# Patient Record
Sex: MALE | Race: OTHER | HISPANIC OR LATINO
[De-identification: names, ages, dates, MRNs, and addresses within clinical notes are randomized per-mention and may not be internally consistent; named-entity substitution may affect disease eponyms.]

---

## 2018-07-12 ENCOUNTER — APPOINTMENT (OUTPATIENT)
Dept: BURN CARE | Facility: CLINIC | Age: 63
End: 2018-07-12

## 2018-07-12 ENCOUNTER — OUTPATIENT (OUTPATIENT)
Dept: OUTPATIENT SERVICES | Facility: HOSPITAL | Age: 63
LOS: 1 days | Discharge: HOME | End: 2018-07-12

## 2018-07-12 PROBLEM — Z00.00 ENCOUNTER FOR PREVENTIVE HEALTH EXAMINATION: Status: ACTIVE | Noted: 2018-07-12
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
## 2018-07-24 DIAGNOSIS — S81.801A UNSPECIFIED OPEN WOUND, RIGHT LOWER LEG, INITIAL ENCOUNTER: ICD-10-CM
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
## 2018-12-03 ENCOUNTER — INPATIENT (INPATIENT)
Facility: HOSPITAL | Age: 63
LOS: 14 days | Discharge: NURSING, REHAB OR LONG-TERM CARE | End: 2018-12-18
Attending: INTERNAL MEDICINE | Admitting: INTERNAL MEDICINE
Payer: MEDICARE

## 2018-12-03 VITALS
SYSTOLIC BLOOD PRESSURE: 146 MMHG | DIASTOLIC BLOOD PRESSURE: 66 MMHG | RESPIRATION RATE: 22 BRPM | OXYGEN SATURATION: 95 % | HEART RATE: 110 BPM | WEIGHT: 233.03 LBS | TEMPERATURE: 101 F

## 2018-12-03 DIAGNOSIS — I87.8 OTHER SPECIFIED DISORDERS OF VEINS: ICD-10-CM

## 2018-12-03 DIAGNOSIS — E87.5 HYPERKALEMIA: ICD-10-CM

## 2018-12-03 DIAGNOSIS — K21.9 GASTRO-ESOPHAGEAL REFLUX DISEASE WITHOUT ESOPHAGITIS: ICD-10-CM

## 2018-12-03 DIAGNOSIS — L02.415 CUTANEOUS ABSCESS OF RIGHT LOWER LIMB: ICD-10-CM

## 2018-12-03 DIAGNOSIS — Z89.512 ACQUIRED ABSENCE OF LEFT LEG BELOW KNEE: Chronic | ICD-10-CM

## 2018-12-03 DIAGNOSIS — I50.9 HEART FAILURE, UNSPECIFIED: ICD-10-CM

## 2018-12-03 DIAGNOSIS — L03.90 CELLULITIS, UNSPECIFIED: ICD-10-CM

## 2018-12-03 DIAGNOSIS — E83.42 HYPOMAGNESEMIA: ICD-10-CM

## 2018-12-03 DIAGNOSIS — E87.1 HYPO-OSMOLALITY AND HYPONATREMIA: ICD-10-CM

## 2018-12-03 DIAGNOSIS — L97.219 NON-PRESSURE CHRONIC ULCER OF RIGHT CALF WITH UNSPECIFIED SEVERITY: ICD-10-CM

## 2018-12-03 DIAGNOSIS — Z28.9 IMMUNIZATION NOT CARRIED OUT FOR UNSPECIFIED REASON: ICD-10-CM

## 2018-12-03 DIAGNOSIS — A41.9 SEPSIS, UNSPECIFIED ORGANISM: ICD-10-CM

## 2018-12-03 DIAGNOSIS — R65.20 SEVERE SEPSIS WITHOUT SEPTIC SHOCK: ICD-10-CM

## 2018-12-03 DIAGNOSIS — K80.20 CALCULUS OF GALLBLADDER WITHOUT CHOLECYSTITIS WITHOUT OBSTRUCTION: ICD-10-CM

## 2018-12-03 DIAGNOSIS — R74.0 NONSPECIFIC ELEVATION OF LEVELS OF TRANSAMINASE AND LACTIC ACID DEHYDROGENASE [LDH]: ICD-10-CM

## 2018-12-03 DIAGNOSIS — D72.819 DECREASED WHITE BLOOD CELL COUNT, UNSPECIFIED: ICD-10-CM

## 2018-12-03 DIAGNOSIS — Z89.612 ACQUIRED ABSENCE OF LEFT LEG ABOVE KNEE: ICD-10-CM

## 2018-12-03 DIAGNOSIS — E87.2 ACIDOSIS: ICD-10-CM

## 2018-12-03 DIAGNOSIS — L03.115 CELLULITIS OF RIGHT LOWER LIMB: ICD-10-CM

## 2018-12-03 DIAGNOSIS — I11.0 HYPERTENSIVE HEART DISEASE WITH HEART FAILURE: ICD-10-CM

## 2018-12-03 DIAGNOSIS — L97.519 NON-PRESSURE CHRONIC ULCER OF OTHER PART OF RIGHT FOOT WITH UNSPECIFIED SEVERITY: ICD-10-CM

## 2018-12-03 LAB
ALBUMIN SERPL ELPH-MCNC: 3.1 G/DL — LOW (ref 3.5–5.2)
ALP SERPL-CCNC: 116 U/L — HIGH (ref 30–115)
ALT FLD-CCNC: 59 U/L — HIGH (ref 0–41)
ANION GAP SERPL CALC-SCNC: 17 MMOL/L — HIGH (ref 7–14)
AST SERPL-CCNC: 100 U/L — HIGH (ref 0–41)
BASOPHILS # BLD AUTO: 0.02 K/UL — SIGNIFICANT CHANGE UP (ref 0–0.2)
BASOPHILS NFR BLD AUTO: 0.1 % — SIGNIFICANT CHANGE UP (ref 0–1)
BILIRUB SERPL-MCNC: 3 MG/DL — HIGH (ref 0.2–1.2)
BUN SERPL-MCNC: 16 MG/DL — SIGNIFICANT CHANGE UP (ref 10–20)
CALCIUM SERPL-MCNC: 8 MG/DL — LOW (ref 8.5–10.1)
CHLORIDE SERPL-SCNC: 93 MMOL/L — LOW (ref 98–110)
CO2 SERPL-SCNC: 19 MMOL/L — SIGNIFICANT CHANGE UP (ref 17–32)
CREAT SERPL-MCNC: 1 MG/DL — SIGNIFICANT CHANGE UP (ref 0.7–1.5)
EOSINOPHIL # BLD AUTO: 0 K/UL — SIGNIFICANT CHANGE UP (ref 0–0.7)
EOSINOPHIL NFR BLD AUTO: 0 % — SIGNIFICANT CHANGE UP (ref 0–8)
GAS PNL BLDV: SIGNIFICANT CHANGE UP
GLUCOSE SERPL-MCNC: 94 MG/DL — SIGNIFICANT CHANGE UP (ref 70–99)
HCT VFR BLD CALC: 42.2 % — SIGNIFICANT CHANGE UP (ref 42–52)
HGB BLD-MCNC: 15.3 G/DL — SIGNIFICANT CHANGE UP (ref 14–18)
IMM GRANULOCYTES NFR BLD AUTO: 0.6 % — HIGH (ref 0.1–0.3)
LACTATE SERPL-SCNC: 2 MMOL/L — SIGNIFICANT CHANGE UP (ref 0.5–2.2)
LDH SERPL L TO P-CCNC: 302 U/L — HIGH (ref 50–242)
LYMPHOCYTES # BLD AUTO: 0.52 K/UL — LOW (ref 1.2–3.4)
LYMPHOCYTES # BLD AUTO: 3.3 % — LOW (ref 20.5–51.1)
MCHC RBC-ENTMCNC: 33.9 PG — HIGH (ref 27–31)
MCHC RBC-ENTMCNC: 36.3 G/DL — SIGNIFICANT CHANGE UP (ref 32–37)
MCV RBC AUTO: 93.6 FL — SIGNIFICANT CHANGE UP (ref 80–94)
MONOCYTES # BLD AUTO: 1.1 K/UL — HIGH (ref 0.1–0.6)
MONOCYTES NFR BLD AUTO: 6.9 % — SIGNIFICANT CHANGE UP (ref 1.7–9.3)
NEUTROPHILS # BLD AUTO: 14.1 K/UL — HIGH (ref 1.4–6.5)
NEUTROPHILS NFR BLD AUTO: 89.1 % — HIGH (ref 42.2–75.2)
PLATELET # BLD AUTO: 105 K/UL — LOW (ref 130–400)
POTASSIUM SERPL-MCNC: 3.4 MMOL/L — LOW (ref 3.5–5)
POTASSIUM SERPL-SCNC: 3.4 MMOL/L — LOW (ref 3.5–5)
PROT SERPL-MCNC: 6.7 G/DL — SIGNIFICANT CHANGE UP (ref 6–8)
RBC # BLD: 4.51 M/UL — LOW (ref 4.7–6.1)
RBC # FLD: 12.1 % — SIGNIFICANT CHANGE UP (ref 11.5–14.5)
SODIUM SERPL-SCNC: 129 MMOL/L — LOW (ref 135–146)
WBC # BLD: 15.83 K/UL — HIGH (ref 4.8–10.8)
WBC # FLD AUTO: 15.83 K/UL — HIGH (ref 4.8–10.8)

## 2018-12-03 RX ORDER — FUROSEMIDE 40 MG
60 TABLET ORAL DAILY
Qty: 0 | Refills: 0 | Status: DISCONTINUED | OUTPATIENT
Start: 2018-12-03 | End: 2018-12-06

## 2018-12-03 RX ORDER — VANCOMYCIN HCL 1 G
1500 VIAL (EA) INTRAVENOUS ONCE
Qty: 0 | Refills: 0 | Status: DISCONTINUED | OUTPATIENT
Start: 2018-12-03 | End: 2018-12-03

## 2018-12-03 RX ORDER — SPIRONOLACTONE 25 MG/1
25 TABLET, FILM COATED ORAL DAILY
Qty: 0 | Refills: 0 | Status: DISCONTINUED | OUTPATIENT
Start: 2018-12-03 | End: 2018-12-13

## 2018-12-03 RX ORDER — ONDANSETRON 8 MG/1
4 TABLET, FILM COATED ORAL EVERY 8 HOURS
Qty: 0 | Refills: 0 | Status: DISCONTINUED | OUTPATIENT
Start: 2018-12-03 | End: 2018-12-13

## 2018-12-03 RX ORDER — PIPERACILLIN AND TAZOBACTAM 4; .5 G/20ML; G/20ML
3.38 INJECTION, POWDER, LYOPHILIZED, FOR SOLUTION INTRAVENOUS EVERY 8 HOURS
Qty: 0 | Refills: 0 | Status: DISCONTINUED | OUTPATIENT
Start: 2018-12-03 | End: 2018-12-13

## 2018-12-03 RX ORDER — MORPHINE SULFATE 50 MG/1
4 CAPSULE, EXTENDED RELEASE ORAL ONCE
Qty: 0 | Refills: 0 | Status: DISCONTINUED | OUTPATIENT
Start: 2018-12-03 | End: 2018-12-03

## 2018-12-03 RX ORDER — CHLORHEXIDINE GLUCONATE 213 G/1000ML
1 SOLUTION TOPICAL
Qty: 0 | Refills: 0 | Status: DISCONTINUED | OUTPATIENT
Start: 2018-12-03 | End: 2018-12-13

## 2018-12-03 RX ORDER — CEFEPIME 1 G/1
2000 INJECTION, POWDER, FOR SOLUTION INTRAMUSCULAR; INTRAVENOUS ONCE
Qty: 0 | Refills: 0 | Status: DISCONTINUED | OUTPATIENT
Start: 2018-12-03 | End: 2018-12-03

## 2018-12-03 RX ORDER — SODIUM CHLORIDE 9 MG/ML
2800 INJECTION, SOLUTION INTRAVENOUS ONCE
Qty: 0 | Refills: 0 | Status: COMPLETED | OUTPATIENT
Start: 2018-12-03 | End: 2018-12-03

## 2018-12-03 RX ORDER — HEPARIN SODIUM 5000 [USP'U]/ML
5000 INJECTION INTRAVENOUS; SUBCUTANEOUS EVERY 8 HOURS
Qty: 0 | Refills: 0 | Status: DISCONTINUED | OUTPATIENT
Start: 2018-12-03 | End: 2018-12-13

## 2018-12-03 RX ORDER — PIPERACILLIN AND TAZOBACTAM 4; .5 G/20ML; G/20ML
3.38 INJECTION, POWDER, LYOPHILIZED, FOR SOLUTION INTRAVENOUS ONCE
Qty: 0 | Refills: 0 | Status: COMPLETED | OUTPATIENT
Start: 2018-12-03 | End: 2018-12-03

## 2018-12-03 RX ORDER — PANTOPRAZOLE SODIUM 20 MG/1
40 TABLET, DELAYED RELEASE ORAL
Qty: 0 | Refills: 0 | Status: DISCONTINUED | OUTPATIENT
Start: 2018-12-03 | End: 2018-12-13

## 2018-12-03 RX ORDER — METOPROLOL TARTRATE 50 MG
25 TABLET ORAL DAILY
Qty: 0 | Refills: 0 | Status: DISCONTINUED | OUTPATIENT
Start: 2018-12-03 | End: 2018-12-13

## 2018-12-03 RX ORDER — MORPHINE SULFATE 50 MG/1
1 CAPSULE, EXTENDED RELEASE ORAL EVERY 8 HOURS
Qty: 0 | Refills: 0 | Status: DISCONTINUED | OUTPATIENT
Start: 2018-12-03 | End: 2018-12-03

## 2018-12-03 RX ORDER — SODIUM CHLORIDE 9 MG/ML
1000 INJECTION INTRAMUSCULAR; INTRAVENOUS; SUBCUTANEOUS
Qty: 0 | Refills: 0 | Status: DISCONTINUED | OUTPATIENT
Start: 2018-12-03 | End: 2018-12-10

## 2018-12-03 RX ORDER — ACETAMINOPHEN 500 MG
650 TABLET ORAL ONCE
Qty: 0 | Refills: 0 | Status: COMPLETED | OUTPATIENT
Start: 2018-12-03 | End: 2018-12-03

## 2018-12-03 RX ORDER — ACETAMINOPHEN 500 MG
650 TABLET ORAL EVERY 6 HOURS
Qty: 0 | Refills: 0 | Status: DISCONTINUED | OUTPATIENT
Start: 2018-12-03 | End: 2018-12-13

## 2018-12-03 RX ORDER — POTASSIUM CHLORIDE 20 MEQ
40 PACKET (EA) ORAL EVERY 4 HOURS
Qty: 0 | Refills: 0 | Status: COMPLETED | OUTPATIENT
Start: 2018-12-03 | End: 2018-12-03

## 2018-12-03 RX ADMIN — PIPERACILLIN AND TAZOBACTAM 25 GRAM(S): 4; .5 INJECTION, POWDER, LYOPHILIZED, FOR SOLUTION INTRAVENOUS at 21:18

## 2018-12-03 RX ADMIN — PIPERACILLIN AND TAZOBACTAM 200 GRAM(S): 4; .5 INJECTION, POWDER, LYOPHILIZED, FOR SOLUTION INTRAVENOUS at 12:16

## 2018-12-03 RX ADMIN — Medication 100 MILLIGRAM(S): at 21:14

## 2018-12-03 RX ADMIN — Medication 650 MILLIGRAM(S): at 12:11

## 2018-12-03 RX ADMIN — Medication 40 MILLIEQUIVALENT(S): at 21:14

## 2018-12-03 RX ADMIN — SODIUM CHLORIDE 2800 MILLILITER(S): 9 INJECTION, SOLUTION INTRAVENOUS at 11:48

## 2018-12-03 RX ADMIN — SODIUM CHLORIDE 50 MILLILITER(S): 9 INJECTION INTRAMUSCULAR; INTRAVENOUS; SUBCUTANEOUS at 18:19

## 2018-12-03 RX ADMIN — Medication 100 MILLIGRAM(S): at 12:10

## 2018-12-03 RX ADMIN — MORPHINE SULFATE 4 MILLIGRAM(S): 50 CAPSULE, EXTENDED RELEASE ORAL at 12:11

## 2018-12-03 NOTE — ED PROVIDER NOTE - PROGRESS NOTE DETAILS
spoke to radiologist.  pt with soft tissue air at site of wound only pt has no abd pain, abd soft, nontender.  no ruq tenderness.  will get ruq sono

## 2018-12-03 NOTE — ED PROVIDER NOTE - PHYSICAL EXAMINATION
GEN: Well appearing, in no apparent distress.    HEAD:  Normocephalic, atraumatic.    EYES:  Clear conjunctivae without injection, drainage or discharge.    ENMT:  Moist MM.    NECK:  Supple, no masses. Normal ROM.    CARDIAC:  RRR, normal S1 and S2, no murmurs, rubs or gallops.    RESP:  Respiratory rate and effort appear normal; lungs are clear to auscultation bilaterally; no rhonchi, rales or wheezes.    ABDOMEN:  Soft, non-tender, non-distended, no masses. Normal BS throughout.    MUSCULOSKELETAL: (+) erythema, warmth, tenderness to LE, with lymphangitis up past knee into mid thigh. no crepitus, (+) tense leg (+) pulses equal B/L. (+) chronic skin changes with ulcerations no active bleeding. (+) BKA right.  NEURO:  AAO x 3. Motor 5/5.     SKIN:  General normal skin color for age and race, well-perfused; warm and dry.

## 2018-12-03 NOTE — ED PROVIDER NOTE - PMH
Circulation disorder of lower extremity  left lower extremity  Edema    GERD (gastroesophageal reflux disease)    Hypertension

## 2018-12-03 NOTE — H&P ADULT - NSHPPHYSICALEXAM_GEN_ALL_CORE
Vital Signs Last 24 Hrs  T(C): 36.8 (03 Dec 2018 15:07), Max: 38.3 (03 Dec 2018 10:36)  T(F): 98.3 (03 Dec 2018 15:07), Max: 100.9 (03 Dec 2018 10:36)  HR: 96 (03 Dec 2018 15:07) (96 - 110)  BP: 113/56 (03 Dec 2018 15:07) (113/56 - 146/66)  RR: 19 (03 Dec 2018 15:07) (19 - 22)  SpO2: 95% (03 Dec 2018 15:07) (95% - 95%)    GEN: Well appearing, in no apparent distress.    HEAD:  Normocephalic, atraumatic.    EYES:  Clear conjunctivae without injection, drainage or discharge.    NECK:  Supple, no masses. Normal ROM.    CARDIAC:  RRR, normal S1 and S2, no murmurs, rubs or gallops.    RESP:  Respiratory rate and effort appear normal; lungs are clear to auscultation bilaterally; no rhonchi, rales or wheezes.    ABDOMEN:  Soft, non-tender, non-distended, no masses. Normal BS throughout.    MUSCULOSKELETAL: (+) erythema, warmth, tenderness to LE, with lymphangitis up past knee into mid thigh. no crepitus, (+) tense leg (+) pulses equal B/L. (+) chronic skin changes with ulcerations no active bleeding. (+) BKA right.  NEURO:  AAO x 3. Motor 5/5.     SKIN:  General normal skin color for age and race, well-perfused; warm and dry. Vital Signs Last 24 Hrs  T(C): 36.8 (03 Dec 2018 15:07), Max: 38.3 (03 Dec 2018 10:36)  T(F): 98.3 (03 Dec 2018 15:07), Max: 100.9 (03 Dec 2018 10:36)  HR: 96 (03 Dec 2018 15:07) (96 - 110)  BP: 113/56 (03 Dec 2018 15:07) (113/56 - 146/66)  RR: 19 (03 Dec 2018 15:07) (19 - 22)  SpO2: 95% (03 Dec 2018 15:07) (95% - 95%)    GEN: Well appearing, in no apparent distress.    HEAD:  Normocephalic, atraumatic.    EYES:  Clear conjunctivae without injection, drainage or discharge.    NECK:  Supple, no masses. Normal ROM.    CARDIAC:  RRR, normal S1 and S2, no murmurs, rubs or gallops.    RESP:  Respiratory rate and effort appear normal; lungs are clear to auscultation bilaterally; no rhonchi, rales or wheezes.    ABDOMEN:  Soft, non-tender, non-distended, no masses. Normal BS throughout.    MUSCULOSKELETAL: (+) erythema, warmth, tenderness to RLE, with lymphangitis up past knee into mid thigh. no crepitus, (+) tense leg (+) pulses equal B/L. (+) chronic skin changes with ulcerations no active bleeding. (+) BKA LEFT    NEURO:  AAO x 3. Motor 5/5.     SKIN:  General normal skin color for age and race, well-perfused; warm and dry.

## 2018-12-03 NOTE — ED ADULT NURSE NOTE - NSIMPLEMENTINTERV_GEN_ALL_ED
Implemented All Fall with Harm Risk Interventions:  Tyler to call system. Call bell, personal items and telephone within reach. Instruct patient to call for assistance. Room bathroom lighting operational. Non-slip footwear when patient is off stretcher. Physically safe environment: no spills, clutter or unnecessary equipment. Stretcher in lowest position, wheels locked, appropriate side rails in place. Provide visual cue, wrist band, yellow gown, etc. Monitor gait and stability. Monitor for mental status changes and reorient to person, place, and time. Review medications for side effects contributing to fall risk. Reinforce activity limits and safety measures with patient and family. Provide visual clues: red socks.

## 2018-12-03 NOTE — H&P ADULT - NSHPREVIEWOFSYSTEMS_GEN_ALL_CORE
Constitutional: No fevers/chills.    Head: No injury, headache.    Eyes:  No visual changes, eye pain or discharge. No injury.    ENMT:  No hearing changes, pain, discharge or infections. No neck pain or stiffness. No loss ROM.    Cardiac:  No chest pain, SOB or edema. No chest pain with exertion.    Respiratory:  No cough or respiratory distress.     GI:  No nausea, vomiting, diarrhea or abdominal pain. No anorexia. No change in PO intake.    :  No frequency, urgency or burning. No change in urine output.    MS:  (+) leg pain, leg swelling, (-) myalgia, muscle weakness, joint pain or back pain. No loss ROM.    Neuro:  No dizziness or weakness.  No LOC.    Skin:  No skin rash. Constitutional: No fevers/chills.    Head: No injury, headache.    Eyes:  No visual changes, eye pain or discharge. No injury.    ENMT:  No hearing changes, pain, discharge or infections. No neck pain or stiffness. No loss ROM.    Cardiac:  No chest pain, SOB or edema. No chest pain with exertion.    Respiratory:  No cough or respiratory distress.     GI:  No nausea, vomiting, diarrhea or abdominal pain. No anorexia. No change in PO intake.    :  No frequency, urgency or burning. No change in urine output.    MS:  (+) RT leg pain, leg swelling, (-) myalgia, muscle weakness, joint pain or back pain. No loss ROM.    Neuro:  No dizziness or weakness.  No LOC.    Skin:  No skin rash.

## 2018-12-03 NOTE — ED ADULT TRIAGE NOTE - CHIEF COMPLAINT QUOTE
isaura from [facility] resident for right leg infection isaura from [facility] resident for right leg infection x 1 week

## 2018-12-03 NOTE — ED PROVIDER NOTE - ATTENDING CONTRIBUTION TO CARE
64 yo m with pmh of htn presents with right leg cellulitis for the past week.  + fevers.  awake, alert.  neck supple.  Significant cellulities to RLE with expanding erythema extending to the thigh, 2+ dp pulses, + tenderness and warmth to LE.  a/p:  sepsis, cellulitis.  p:  iv abx, will do ct given extensive cellulitis and sepsis for possible nec fasc.  admission

## 2018-12-03 NOTE — H&P ADULT - HISTORY OF PRESENT ILLNESS
62 yo male with PMHx GERD, HTN, PVD s/p right BKA presents for right LE cellulitis. Patient has chronic leg wound and had worsening pain starting yesterday. 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 Patient has chronic leg wound and had worsening pain starting yesterday. Patient denies chest pain, SOB, abdominal pain, nausea, vomiting, diarrhea, dizziness, weakness, changes in PO intake, changes in urine output, changes in mental status  subjective fever + at home

## 2018-12-03 NOTE — ED ADULT NURSE NOTE - PMH
Circulation disorder of lower extremity  left lower extremity  Edema    GERD (gastroesophageal reflux disease)    Hypertension
Check here if all serologies below were negative, non-reactive or immune. Otherwise select appropriate status.

## 2018-12-03 NOTE — H&P ADULT - ASSESSMENT
62 yo male with PMHx GERD, HTN, PVD s/p right BKA presents for right LE cellulitis. ACUTE ON CHRONIC WOUNDS       - iv ABX  - LABS IN AM   - INFECTIOUS DZ CONSULT   - IV HYDRATION   -cont meds   - follow vitals 62 yo male with PMHx GERD, HTN, PVD s/p LEFT BKA - 18 years ago  presents for right LE cellulitis. ACUTE ON CHRONIC WOUNDS       - iv ABX  - LABS IN AM   - INFECTIOUS DZ CONSULT   - IV HYDRATION   - cont meds   - follow vitals

## 2018-12-03 NOTE — ED PROVIDER NOTE - OBJECTIVE STATEMENT
62 yo male with PMHx GERD, HTN, PVD s/p right BKA presents for right LE cellulitis. Patient has chronic leg wound and had worsening pain starting yesterday. Patient denies chest pain, SOB, abdominal pain, nausea, vomiting, diarrhea, dizziness, weakness, changes in PO intake, changes in urine output, changes in mental status.

## 2018-12-03 NOTE — ED PROVIDER NOTE - MEDICAL DECISION MAKING DETAILS
pt with sepsis, cellulitis.  CT no evidence of nec fasciatis.  mild elevated bili.  No abd pain, no abd tenderness. no ruq tenderness.  sono with gallstones only.  pt admitted to medicine.  pt given ivf and iv abx. pt with sepsis, cellulitis.  CT no evidence of nec fasciatis.  mild elevated bili.  No abd pain, no abd tenderness. no ruq tenderness.  sono with gallstones only.  pt admitted to medicine.  pt given ivf and iv abx.  medical PA to follow up repeat lactate

## 2018-12-03 NOTE — H&P ADULT - NSHPLABSRESULTS_GEN_ALL_CORE
12-03    129<L>  |  93<L>  |  16  ----------------------------<  94  3.4<L>   |  19  |  1.0    Ca    8.0<L>      03 Dec 2018 12:32    TPro  6.7  /  Alb  3.1<L>  /  TBili  3.0<H>  /  DBili  x   /  AST  100<H>  /  ALT  59<H>  /  AlkPhos  116<H>  12-03                            15.3   15.83 )-----------( 105      ( 03 Dec 2018 12:32 )             42.2    < from: CT Lower Extremity No Cont, Right (12.03.18 @ 14:39) >    Impression:  1. No CT evidence of necrotizing fasciitis  2. Diffuse cellulitis from mid third of leg to foot with soft tissue   ulcerations along medial lateral distal leg  3. Medial tibial periostitis worst at the distal third consistent with   osteitis    < end of copied text >

## 2018-12-03 NOTE — ED PROVIDER NOTE - NS ED ROS FT
Constitutional: No fevers/chills.    Head: No injury, headache.    Eyes:  No visual changes, eye pain or discharge. No injury.    ENMT:  No hearing changes, pain, discharge or infections. No neck pain or stiffness. No loss ROM.    Cardiac:  No chest pain, SOB or edema. No chest pain with exertion.    Respiratory:  No cough or respiratory distress.     GI:  No nausea, vomiting, diarrhea or abdominal pain. No anorexia. No change in PO intake.    :  No frequency, urgency or burning. No change in urine output.    MS:  (+) leg pain, leg swelling, (-) myalgia, muscle weakness, joint pain or back pain. No loss ROM.    Neuro:  No dizziness or weakness.  No LOC.    Skin:  No skin rash.

## 2018-12-04 LAB
ANION GAP SERPL CALC-SCNC: 14 MMOL/L — SIGNIFICANT CHANGE UP (ref 7–14)
BUN SERPL-MCNC: 16 MG/DL — SIGNIFICANT CHANGE UP (ref 10–20)
CALCIUM SERPL-MCNC: 7.7 MG/DL — LOW (ref 8.5–10.1)
CHLORIDE SERPL-SCNC: 98 MMOL/L — SIGNIFICANT CHANGE UP (ref 98–110)
CO2 SERPL-SCNC: 21 MMOL/L — SIGNIFICANT CHANGE UP (ref 17–32)
CREAT SERPL-MCNC: 0.8 MG/DL — SIGNIFICANT CHANGE UP (ref 0.7–1.5)
GLUCOSE SERPL-MCNC: 84 MG/DL — SIGNIFICANT CHANGE UP (ref 70–99)
HCT VFR BLD CALC: 37.9 % — LOW (ref 42–52)
HGB BLD-MCNC: 13.9 G/DL — LOW (ref 14–18)
LACTATE SERPL-SCNC: 2.8 MMOL/L — HIGH (ref 0.5–2.2)
MAGNESIUM SERPL-MCNC: 1.8 MG/DL — SIGNIFICANT CHANGE UP (ref 1.8–2.4)
MCHC RBC-ENTMCNC: 35.4 PG — HIGH (ref 27–31)
MCHC RBC-ENTMCNC: 36.7 G/DL — SIGNIFICANT CHANGE UP (ref 32–37)
MCV RBC AUTO: 96.4 FL — HIGH (ref 80–94)
NRBC # BLD: 0 /100 WBCS — SIGNIFICANT CHANGE UP (ref 0–0)
PLATELET # BLD AUTO: 108 K/UL — LOW (ref 130–400)
POTASSIUM SERPL-MCNC: 4.2 MMOL/L — SIGNIFICANT CHANGE UP (ref 3.5–5)
POTASSIUM SERPL-SCNC: 4.2 MMOL/L — SIGNIFICANT CHANGE UP (ref 3.5–5)
RBC # BLD: 3.93 M/UL — LOW (ref 4.7–6.1)
RBC # FLD: 12.5 % — SIGNIFICANT CHANGE UP (ref 11.5–14.5)
SODIUM SERPL-SCNC: 133 MMOL/L — LOW (ref 135–146)
WBC # BLD: 13.53 K/UL — HIGH (ref 4.8–10.8)
WBC # FLD AUTO: 13.53 K/UL — HIGH (ref 4.8–10.8)

## 2018-12-04 RX ORDER — SODIUM CHLORIDE 9 MG/ML
500 INJECTION INTRAMUSCULAR; INTRAVENOUS; SUBCUTANEOUS ONCE
Qty: 0 | Refills: 0 | Status: COMPLETED | OUTPATIENT
Start: 2018-12-04 | End: 2018-12-04

## 2018-12-04 RX ORDER — SODIUM CHLORIDE 9 MG/ML
50 INJECTION INTRAMUSCULAR; INTRAVENOUS; SUBCUTANEOUS ONCE
Qty: 0 | Refills: 0 | Status: DISCONTINUED | OUTPATIENT
Start: 2018-12-04 | End: 2018-12-04

## 2018-12-04 RX ADMIN — PANTOPRAZOLE SODIUM 40 MILLIGRAM(S): 20 TABLET, DELAYED RELEASE ORAL at 05:06

## 2018-12-04 RX ADMIN — Medication 25 MILLIGRAM(S): at 05:06

## 2018-12-04 RX ADMIN — Medication 650 MILLIGRAM(S): at 19:49

## 2018-12-04 RX ADMIN — Medication 100 MILLIGRAM(S): at 05:07

## 2018-12-04 RX ADMIN — Medication 60 MILLIGRAM(S): at 05:06

## 2018-12-04 RX ADMIN — HEPARIN SODIUM 5000 UNIT(S): 5000 INJECTION INTRAVENOUS; SUBCUTANEOUS at 14:50

## 2018-12-04 RX ADMIN — Medication 100 MILLIGRAM(S): at 13:48

## 2018-12-04 RX ADMIN — SODIUM CHLORIDE 500 MILLILITER(S): 9 INJECTION INTRAMUSCULAR; INTRAVENOUS; SUBCUTANEOUS at 22:58

## 2018-12-04 RX ADMIN — SODIUM CHLORIDE 50 MILLILITER(S): 9 INJECTION INTRAMUSCULAR; INTRAVENOUS; SUBCUTANEOUS at 21:18

## 2018-12-04 RX ADMIN — Medication 100 MILLIGRAM(S): at 21:18

## 2018-12-04 RX ADMIN — PIPERACILLIN AND TAZOBACTAM 25 GRAM(S): 4; .5 INJECTION, POWDER, LYOPHILIZED, FOR SOLUTION INTRAVENOUS at 05:07

## 2018-12-04 RX ADMIN — Medication 1 APPLICATION(S): at 11:15

## 2018-12-04 RX ADMIN — PIPERACILLIN AND TAZOBACTAM 25 GRAM(S): 4; .5 INJECTION, POWDER, LYOPHILIZED, FOR SOLUTION INTRAVENOUS at 21:18

## 2018-12-04 RX ADMIN — SPIRONOLACTONE 25 MILLIGRAM(S): 25 TABLET, FILM COATED ORAL at 05:06

## 2018-12-04 RX ADMIN — Medication 650 MILLIGRAM(S): at 19:40

## 2018-12-04 RX ADMIN — PIPERACILLIN AND TAZOBACTAM 25 GRAM(S): 4; .5 INJECTION, POWDER, LYOPHILIZED, FOR SOLUTION INTRAVENOUS at 13:48

## 2018-12-05 RX ORDER — LINEZOLID 600 MG/300ML
600 INJECTION, SOLUTION INTRAVENOUS ONCE
Qty: 0 | Refills: 0 | Status: COMPLETED | OUTPATIENT
Start: 2018-12-05 | End: 2018-12-06

## 2018-12-05 RX ORDER — LINEZOLID 600 MG/300ML
INJECTION, SOLUTION INTRAVENOUS
Qty: 0 | Refills: 0 | Status: DISCONTINUED | OUTPATIENT
Start: 2018-12-06 | End: 2018-12-13

## 2018-12-05 RX ORDER — LINEZOLID 600 MG/300ML
600 INJECTION, SOLUTION INTRAVENOUS EVERY 12 HOURS
Qty: 0 | Refills: 0 | Status: DISCONTINUED | OUTPATIENT
Start: 2018-12-06 | End: 2018-12-13

## 2018-12-05 RX ADMIN — PIPERACILLIN AND TAZOBACTAM 25 GRAM(S): 4; .5 INJECTION, POWDER, LYOPHILIZED, FOR SOLUTION INTRAVENOUS at 14:55

## 2018-12-05 RX ADMIN — Medication 60 MILLIGRAM(S): at 05:22

## 2018-12-05 RX ADMIN — Medication 650 MILLIGRAM(S): at 06:50

## 2018-12-05 RX ADMIN — Medication 650 MILLIGRAM(S): at 06:02

## 2018-12-05 RX ADMIN — PANTOPRAZOLE SODIUM 40 MILLIGRAM(S): 20 TABLET, DELAYED RELEASE ORAL at 05:21

## 2018-12-05 RX ADMIN — Medication 100 MILLIGRAM(S): at 21:50

## 2018-12-05 RX ADMIN — PIPERACILLIN AND TAZOBACTAM 25 GRAM(S): 4; .5 INJECTION, POWDER, LYOPHILIZED, FOR SOLUTION INTRAVENOUS at 05:22

## 2018-12-05 RX ADMIN — Medication 25 MILLIGRAM(S): at 05:21

## 2018-12-05 RX ADMIN — HEPARIN SODIUM 5000 UNIT(S): 5000 INJECTION INTRAVENOUS; SUBCUTANEOUS at 14:55

## 2018-12-05 RX ADMIN — SODIUM CHLORIDE 50 MILLILITER(S): 9 INJECTION INTRAMUSCULAR; INTRAVENOUS; SUBCUTANEOUS at 21:49

## 2018-12-05 RX ADMIN — PIPERACILLIN AND TAZOBACTAM 25 GRAM(S): 4; .5 INJECTION, POWDER, LYOPHILIZED, FOR SOLUTION INTRAVENOUS at 21:49

## 2018-12-05 RX ADMIN — SPIRONOLACTONE 25 MILLIGRAM(S): 25 TABLET, FILM COATED ORAL at 05:21

## 2018-12-05 RX ADMIN — Medication 100 MILLIGRAM(S): at 14:55

## 2018-12-05 RX ADMIN — Medication 1 APPLICATION(S): at 11:31

## 2018-12-05 RX ADMIN — Medication 100 MILLIGRAM(S): at 05:22

## 2018-12-05 NOTE — PROGRESS NOTE ADULT - ASSESSMENT
IMPRESSION  Severe Sepsis (pulse>90 beats/min , resp rate>20/min, wbc>12, lactic acidosis) due to RLE cellulitis/lymphangitis    Pt with hyponatremia, lactic acidosis,    On clindamycin IVPB 300 milliGRAM(s) IV Intermittent every 8 hours  piperacillin/tazobactam IVPB. 3.375 Gram(s) IV Intermittent every 8h    LE CT with diffuse cellulitis from mid third of leg to foot with soft tissue ulcerations along medial lateral distal leg  Medial tibial periostitis worst at the distal third consistent with osteitis    Right foot xray with no destructive lesion, acute fracture or dislocation.    plan-------------------------------------------------------------------------------------  Await blood cultures  Continue Zosyn  D/C clindamycin since Zosyn will cover MSSA & anaerobes  Repeat sodium, white blood cell count

## 2018-12-05 NOTE — PROGRESS NOTE ADULT - SUBJECTIVE AND OBJECTIVE BOX
JUDY PEÑALOZA  63y  Male    Patient is a 63y old  Male who presents with a chief complaint of fever and RLE erythema for 1 week (04 Dec 2018 07:50)    ALLERGIES:  No Known Allergies      INTERVAL HPI/OVERNIGHT EVENTS:    VITALS:  T(F): 99.3 (12-05-18 @ 14:21), Max: 99.9 (12-05-18 @ 05:15)  HR: 96 (12-05-18 @ 14:21) (92 - 103)  BP: 106/75 (12-05-18 @ 14:21) (95/48 - 126/67)  RR: 16 (12-05-18 @ 14:21) (16 - 16)  SpO2: --    LABS:  12-04    133<L>  |  98  |  16  ----------------------------<  84  4.2   |  21  |  0.8    Ca    7.7<L>      04 Dec 2018 06:35  Mg     1.8     12-04      MICROBIOLOGY:    Culture - Blood (collected 12-03-18 @ 12:32)  Source: .Blood Blood-Peripheral  Preliminary Report (12-04-18 @ 23:01):    No growth to date.    Culture - Blood (collected 12-03-18 @ 12:32)  Source: .Blood Blood-Peripheral  Preliminary Report (12-04-18 @ 23:01):    No growth to date.      MEDICATION:  acetaminophen   Tablet .. 650 milliGRAM(s) Oral every 6 hours PRN  chlorhexidine 4% Liquid 1 Application(s) Topical <User Schedule>  clindamycin IVPB 300 milliGRAM(s) IV Intermittent every 8 hours  furosemide    Tablet 60 milliGRAM(s) Oral daily  heparin  Injectable 5000 Unit(s) SubCutaneous every 8 hours  metoprolol succinate ER 25 milliGRAM(s) Oral daily  morphine  - Injectable 1 milliGRAM(s) IV Push every 8 hours PRN  ondansetron Injectable 4 milliGRAM(s) IV Push every 8 hours PRN  pantoprazole    Tablet 40 milliGRAM(s) Oral before breakfast  piperacillin/tazobactam IVPB. 3.375 Gram(s) IV Intermittent every 8 hours  potassium chloride    Tablet ER 40 milliEquivalent(s) Oral every 4 hours  silver sulfADIAZINE 1% Cream 1 Application(s) Topical daily  sodium chloride 0.9%. 1000 milliLiter(s) IV Continuous <Continuous>  spironolactone 25 milliGRAM(s) Oral daily    RADIOLOGY & ADDITIONAL TESTS:

## 2018-12-05 NOTE — PROGRESS NOTE ADULT - SUBJECTIVE AND OBJECTIVE BOX
Patient is a 63y old  Male who presents with a chief complaint of fever and RLE erythema for 1 week (05 Dec 2018 15:19)      INTERVAL HPI/OVERNIGHT EVENTS:    MEDICATIONS  (STANDING):  chlorhexidine 4% Liquid 1 Application(s) Topical <User Schedule>  clindamycin IVPB 300 milliGRAM(s) IV Intermittent every 8 hours  furosemide    Tablet 60 milliGRAM(s) Oral daily  heparin  Injectable 5000 Unit(s) SubCutaneous every 8 hours  metoprolol succinate ER 25 milliGRAM(s) Oral daily  pantoprazole    Tablet 40 milliGRAM(s) Oral before breakfast  piperacillin/tazobactam IVPB. 3.375 Gram(s) IV Intermittent every 8 hours  potassium chloride    Tablet ER 40 milliEquivalent(s) Oral every 4 hours  silver sulfADIAZINE 1% Cream 1 Application(s) Topical daily  sodium chloride 0.9%. 1000 milliLiter(s) (50 mL/Hr) IV Continuous <Continuous>  spironolactone 25 milliGRAM(s) Oral daily    MEDICATIONS  (PRN):  acetaminophen   Tablet .. 650 milliGRAM(s) Oral every 6 hours PRN Temp greater or equal to 38C (100.4F), Mild Pain (1 - 3)  morphine  - Injectable 1 milliGRAM(s) IV Push every 8 hours PRN Severe Pain (7 - 10)  ondansetron Injectable 4 milliGRAM(s) IV Push every 8 hours PRN Nausea and/or Vomiting      Allergies    No Known Allergies    Intolerances        REVIEW OF SYSTEMS:  CONSTITUTIONAL: No fever, weight loss, or fatigue  EYES: No eye pain, visual disturbances, or discharge  ENMT:  No difficulty hearing, tinnitus, vertigo; No sinus or throat pain  NECK: No pain or stiffness  BREASTS: No pain, masses, or nipple discharge  RESPIRATORY: No cough, wheezing, chills or hemoptysis; No shortness of breath  CARDIOVASCULAR: No chest pain, palpitations, dizziness, or leg swelling  GASTROINTESTINAL: No abdominal or epigastric pain. No nausea, vomiting, or hematemesis; No diarrhea or constipation. No melena or hematochezia.  GENITOURINARY: No dysuria, frequency, hematuria, or incontinence  NEUROLOGICAL: No headaches, memory loss, loss of strength, numbness, or tremors  SKIN: No itching, burning, rashes, or lesions   LYMPH NODES: No enlarged glands  ENDOCRINE: No heat or cold intolerance; No hair loss  MUSCULOSKELETAL: No joint pain or swelling; No muscle, back, or extremity pain  PSYCHIATRIC: No depression, anxiety, mood swings, or difficulty sleeping  HEME/LYMPH: No easy bruising, or bleeding gums  ALLERGY AND IMMUNOLOGIC: No hives or eczema    Vital Signs Last 24 Hrs  T(C): 37.4 (05 Dec 2018 14:21), Max: 37.7 (05 Dec 2018 05:15)  T(F): 99.3 (05 Dec 2018 14:21), Max: 99.9 (05 Dec 2018 05:15)  HR: 96 (05 Dec 2018 14:21) (92 - 103)  BP: 106/75 (05 Dec 2018 14:21) (99/51 - 126/67)  BP(mean): --  RR: 16 (05 Dec 2018 14:21) (16 - 16)  SpO2: --    PHYSICAL EXAM:  GENERAL: NAD, well-groomed, well-developed  HEAD:  Atraumatic, Normocephalic  EYES: EOMI, PERRLA, conjunctiva and sclera clear  ENMT: No tonsillar erythema, exudates, or enlargement; Moist mucous membranes, Good dentition, No lesions  NECK: Supple, No JVD, Normal thyroid  NERVOUS SYSTEM:  Alert & Oriented X3, Good concentration; Motor Strength 5/5 B/L upper and lower extremities; DTRs 2+ intact and symmetric  CHEST/LUNG: Clear to percussion bilaterally; No rales, rhonchi, wheezing, or rubs  HEART: Regular rate and rhythm; No murmurs, rubs, or gallops  ABDOMEN: Soft, Nontender, Nondistended; Bowel sounds present  EXTREMITIES:  2+ Peripheral Pulses, No clubbing, cyanosis, or edema  LYMPH: No lymphadenopathy noted  SKIN: No rashes or lesions    LABS:                        13.9   13.53 )-----------( 108      ( 04 Dec 2018 06:35 )             37.9     12-04    133<L>  |  98  |  16  ----------------------------<  84  4.2   |  21  |  0.8    Ca    7.7<L>      04 Dec 2018 06:35  Mg     1.8     12-04          CAPILLARY BLOOD GLUCOSE          RADIOLOGY & ADDITIONAL TESTS:    Imaging Personally Reviewed:  [ ] YES  [ ] NO    Consultant(s) Notes Reviewed:  [ ] YES  [ ] NO    Care Discussed with Consultants/Other Providers [ ] YES  [ ] NO Patient is seen and examined at the bed side, is afebrile. He is c/o worsening RLE pain and redness which is extending to upper thigh.      REVIEW OF SYSTEMS: All other review systems are negative          Vital Signs Last 24 Hrs  T(C): 37.4 (05 Dec 2018 14:21), Max: 37.7 (05 Dec 2018 05:15)  T(F): 99.3 (05 Dec 2018 14:21), Max: 99.9 (05 Dec 2018 05:15)  HR: 96 (05 Dec 2018 14:21) (92 - 103)  BP: 106/75 (05 Dec 2018 14:21) (99/51 - 126/67)  BP(mean): --  RR: 16 (05 Dec 2018 14:21) (16 - 16)  SpO2: --        PHYSICAL EXAM:  GENERAL: Not in acute distress  CHEST/LUNG: Air entry  bilaterally  HEART: s1 and s2 present  ABDOMEN: Nontender, and Nondistended  EXTREMITIES:  RLE redness, pain, swelling has worsens and also extending to upper thigh, Left AKA  CNS: Awake and alert          MEDICATIONS  (STANDING):  chlorhexidine 4% Liquid 1 Application(s) Topical <User Schedule>  clindamycin IVPB 300 milliGRAM(s) IV Intermittent every 8 hours  furosemide    Tablet 60 milliGRAM(s) Oral daily  heparin  Injectable 5000 Unit(s) SubCutaneous every 8 hours  metoprolol succinate ER 25 milliGRAM(s) Oral daily  pantoprazole    Tablet 40 milliGRAM(s) Oral before breakfast  piperacillin/tazobactam IVPB. 3.375 Gram(s) IV Intermittent every 8 hours  potassium chloride    Tablet ER 40 milliEquivalent(s) Oral every 4 hours  silver sulfADIAZINE 1% Cream 1 Application(s) Topical daily  sodium chloride 0.9%. 1000 milliLiter(s) (50 mL/Hr) IV Continuous <Continuous>  spironolactone 25 milliGRAM(s) Oral daily    MEDICATIONS  (PRN):  acetaminophen   Tablet .. 650 milliGRAM(s) Oral every 6 hours PRN Temp greater or equal to 38C (100.4F), Mild Pain (1 - 3)  morphine  - Injectable 1 milliGRAM(s) IV Push every 8 hours PRN Severe Pain (7 - 10)  ondansetron Injectable 4 milliGRAM(s) IV Push every 8 hours PRN Nausea and/or Vomiting      Allergies    No Known Allergies        LABS:                        13.9   13.53 )-----------( 108      ( 04 Dec 2018 06:35 )             37.9       12-04    133<L>  |  98  |  16  ----------------------------<  84  4.2   |  21  |  0.8    Ca    7.7<L>      04 Dec 2018 06:35  Mg     1.8     12-04        RADIOLOGY & ADDITIONAL TESTS:  < from: CT Lower Extremity No Cont, Right (12.03.18 @ 14:39) >  1. No CT evidence of necrotizing fasciitis  2. Diffuse cellulitis from mid third of leg to foot with soft tissue   ulcerations along medial lateral distal leg  3. Medial tibial periostitis worst at the distal third consistent with   osteitis      < end of copied text >        MICROBIOLOGY DATA:    Culture - Blood (12.03.18 @ 12:32)    Specimen Source: .Blood Blood-Peripheral    Culture Results:   No growth to date.      Culture - Blood (12.03.18 @ 12:32)    Specimen Source: .Blood Blood-Peripheral    Culture Results:   No growth to date.

## 2018-12-05 NOTE — PROGRESS NOTE ADULT - ASSESSMENT
Severe Sepsis (pulse>90 beats/min , resp rate>20/min, wbc>12, lactic acidosis) due to RLE cellulitis/lymphangitis    Pt with hyponatremia, lactic acidosis,    On clindamycin IVPB 300 milliGRAM(s) IV Intermittent every 8 hours  piperacillin/tazobactam IVPB. 3.375 Gram(s) IV Intermittent every 8h    LE CT with diffuse cellulitis from mid third of leg to foot with soft tissue ulcerations along medial lateral distal leg  Medial tibial periostitis worst at the distal third consistent with osteitis    Right foot xray with no destructive lesion, acute fracture or dislocation.    SUGGESTIONs  Await blood cultures  Continue Zosyn  D/C clindamycin since Zosyn will cover MSSA & anaerobes  Repeat sodium, white blood cell count # Severe Sepsis   # RLE cellulitis/lymphangitis    Would recommend:  1. Add Linezolid since worsening  RLE cellulitis   2. Continue Zosyn  3. Keep RLE elevated  4. Pain management as needed    d/w Patient and Nursing  staff

## 2018-12-06 RX ORDER — FUROSEMIDE 40 MG
20 TABLET ORAL DAILY
Qty: 0 | Refills: 0 | Status: DISCONTINUED | OUTPATIENT
Start: 2018-12-06 | End: 2018-12-13

## 2018-12-06 RX ADMIN — LINEZOLID 300 MILLIGRAM(S): 600 INJECTION, SOLUTION INTRAVENOUS at 17:45

## 2018-12-06 RX ADMIN — PANTOPRAZOLE SODIUM 40 MILLIGRAM(S): 20 TABLET, DELAYED RELEASE ORAL at 06:07

## 2018-12-06 RX ADMIN — Medication 20 MILLIGRAM(S): at 15:32

## 2018-12-06 RX ADMIN — HEPARIN SODIUM 5000 UNIT(S): 5000 INJECTION INTRAVENOUS; SUBCUTANEOUS at 06:08

## 2018-12-06 RX ADMIN — HEPARIN SODIUM 5000 UNIT(S): 5000 INJECTION INTRAVENOUS; SUBCUTANEOUS at 22:42

## 2018-12-06 RX ADMIN — PIPERACILLIN AND TAZOBACTAM 25 GRAM(S): 4; .5 INJECTION, POWDER, LYOPHILIZED, FOR SOLUTION INTRAVENOUS at 06:07

## 2018-12-06 RX ADMIN — PIPERACILLIN AND TAZOBACTAM 25 GRAM(S): 4; .5 INJECTION, POWDER, LYOPHILIZED, FOR SOLUTION INTRAVENOUS at 14:31

## 2018-12-06 RX ADMIN — LINEZOLID 300 MILLIGRAM(S): 600 INJECTION, SOLUTION INTRAVENOUS at 00:25

## 2018-12-06 RX ADMIN — Medication 1 APPLICATION(S): at 11:25

## 2018-12-06 RX ADMIN — SODIUM CHLORIDE 50 MILLILITER(S): 9 INJECTION INTRAMUSCULAR; INTRAVENOUS; SUBCUTANEOUS at 15:07

## 2018-12-06 RX ADMIN — PIPERACILLIN AND TAZOBACTAM 25 GRAM(S): 4; .5 INJECTION, POWDER, LYOPHILIZED, FOR SOLUTION INTRAVENOUS at 22:42

## 2018-12-06 RX ADMIN — Medication 25 MILLIGRAM(S): at 06:07

## 2018-12-06 RX ADMIN — LINEZOLID 300 MILLIGRAM(S): 600 INJECTION, SOLUTION INTRAVENOUS at 06:07

## 2018-12-06 RX ADMIN — HEPARIN SODIUM 5000 UNIT(S): 5000 INJECTION INTRAVENOUS; SUBCUTANEOUS at 14:32

## 2018-12-06 NOTE — PROGRESS NOTE ADULT - SUBJECTIVE AND OBJECTIVE BOX
JUDY PEÑALOZA  63y  Male    Patient is a 63y old  Male who presents with a chief complaint of fever and RLE erythema for 1 week (06 Dec 2018 10:53)    ALLERGIES:  No Known Allergies      INTERVAL HPI/OVERNIGHT EVENTS:    VITALS:  T(F): 99.5 (12-06-18 @ 14:28), Max: 99.5 (12-06-18 @ 14:28)  HR: 100 (12-06-18 @ 14:28) (95 - 100)  BP: 114/55 (12-06-18 @ 14:28) (100/50 - 114/55)  RR: 16 (12-06-18 @ 14:28) (16 - 16)  SpO2: --    LABS:        MICROBIOLOGY:    MEDICATION:  acetaminophen   Tablet .. 650 milliGRAM(s) Oral every 6 hours PRN  chlorhexidine 4% Liquid 1 Application(s) Topical <User Schedule>  furosemide    Tablet 20 milliGRAM(s) Oral daily  heparin  Injectable 5000 Unit(s) SubCutaneous every 8 hours  linezolid  IVPB 600 milliGRAM(s) IV Intermittent every 12 hours  linezolid  IVPB      metoprolol succinate ER 25 milliGRAM(s) Oral daily  morphine  - Injectable 1 milliGRAM(s) IV Push every 8 hours PRN  ondansetron Injectable 4 milliGRAM(s) IV Push every 8 hours PRN  pantoprazole    Tablet 40 milliGRAM(s) Oral before breakfast  piperacillin/tazobactam IVPB. 3.375 Gram(s) IV Intermittent every 8 hours  potassium chloride    Tablet ER 40 milliEquivalent(s) Oral every 4 hours  silver sulfADIAZINE 1% Cream 1 Application(s) Topical daily  sodium chloride 0.9%. 1000 milliLiter(s) IV Continuous <Continuous>  spironolactone 25 milliGRAM(s) Oral daily    RADIOLOGY & ADDITIONAL TESTS:

## 2018-12-07 LAB
ALBUMIN SERPL ELPH-MCNC: 2.1 G/DL — LOW (ref 3.5–5.2)
ALP SERPL-CCNC: 174 U/L — HIGH (ref 30–115)
ALT FLD-CCNC: 71 U/L — HIGH (ref 0–41)
ANION GAP SERPL CALC-SCNC: 14 MMOL/L — SIGNIFICANT CHANGE UP (ref 7–14)
AST SERPL-CCNC: 101 U/L — HIGH (ref 0–41)
BILIRUB SERPL-MCNC: 2 MG/DL — HIGH (ref 0.2–1.2)
BUN SERPL-MCNC: 11 MG/DL — SIGNIFICANT CHANGE UP (ref 10–20)
CALCIUM SERPL-MCNC: 7.6 MG/DL — LOW (ref 8.5–10.1)
CHLORIDE SERPL-SCNC: 102 MMOL/L — SIGNIFICANT CHANGE UP (ref 98–110)
CO2 SERPL-SCNC: 19 MMOL/L — SIGNIFICANT CHANGE UP (ref 17–32)
CREAT SERPL-MCNC: 0.8 MG/DL — SIGNIFICANT CHANGE UP (ref 0.7–1.5)
GLUCOSE SERPL-MCNC: 52 MG/DL — LOW (ref 70–99)
HCT VFR BLD CALC: 35.7 % — LOW (ref 42–52)
HGB BLD-MCNC: 12.7 G/DL — LOW (ref 14–18)
MAGNESIUM SERPL-MCNC: 2.1 MG/DL — SIGNIFICANT CHANGE UP (ref 1.8–2.4)
MCHC RBC-ENTMCNC: 34 PG — HIGH (ref 27–31)
MCHC RBC-ENTMCNC: 35.6 G/DL — SIGNIFICANT CHANGE UP (ref 32–37)
MCV RBC AUTO: 95.5 FL — HIGH (ref 80–94)
NRBC # BLD: 0 /100 WBCS — SIGNIFICANT CHANGE UP (ref 0–0)
PLATELET # BLD AUTO: 135 K/UL — SIGNIFICANT CHANGE UP (ref 130–400)
POTASSIUM SERPL-MCNC: 3.6 MMOL/L — SIGNIFICANT CHANGE UP (ref 3.5–5)
POTASSIUM SERPL-SCNC: 3.6 MMOL/L — SIGNIFICANT CHANGE UP (ref 3.5–5)
PROT SERPL-MCNC: 5.6 G/DL — LOW (ref 6–8)
RBC # BLD: 3.74 M/UL — LOW (ref 4.7–6.1)
RBC # FLD: 12.7 % — SIGNIFICANT CHANGE UP (ref 11.5–14.5)
SODIUM SERPL-SCNC: 135 MMOL/L — SIGNIFICANT CHANGE UP (ref 135–146)
WBC # BLD: 18.89 K/UL — HIGH (ref 4.8–10.8)
WBC # FLD AUTO: 18.89 K/UL — HIGH (ref 4.8–10.8)

## 2018-12-07 RX ADMIN — Medication 20 MILLIGRAM(S): at 07:03

## 2018-12-07 RX ADMIN — HEPARIN SODIUM 5000 UNIT(S): 5000 INJECTION INTRAVENOUS; SUBCUTANEOUS at 07:04

## 2018-12-07 RX ADMIN — PIPERACILLIN AND TAZOBACTAM 25 GRAM(S): 4; .5 INJECTION, POWDER, LYOPHILIZED, FOR SOLUTION INTRAVENOUS at 06:58

## 2018-12-07 RX ADMIN — HEPARIN SODIUM 5000 UNIT(S): 5000 INJECTION INTRAVENOUS; SUBCUTANEOUS at 14:48

## 2018-12-07 RX ADMIN — PIPERACILLIN AND TAZOBACTAM 25 GRAM(S): 4; .5 INJECTION, POWDER, LYOPHILIZED, FOR SOLUTION INTRAVENOUS at 22:01

## 2018-12-07 RX ADMIN — PANTOPRAZOLE SODIUM 40 MILLIGRAM(S): 20 TABLET, DELAYED RELEASE ORAL at 07:04

## 2018-12-07 RX ADMIN — Medication 1 APPLICATION(S): at 12:10

## 2018-12-07 RX ADMIN — SPIRONOLACTONE 25 MILLIGRAM(S): 25 TABLET, FILM COATED ORAL at 07:03

## 2018-12-07 RX ADMIN — HEPARIN SODIUM 5000 UNIT(S): 5000 INJECTION INTRAVENOUS; SUBCUTANEOUS at 22:02

## 2018-12-07 RX ADMIN — LINEZOLID 300 MILLIGRAM(S): 600 INJECTION, SOLUTION INTRAVENOUS at 06:58

## 2018-12-07 RX ADMIN — LINEZOLID 300 MILLIGRAM(S): 600 INJECTION, SOLUTION INTRAVENOUS at 17:28

## 2018-12-07 RX ADMIN — PIPERACILLIN AND TAZOBACTAM 25 GRAM(S): 4; .5 INJECTION, POWDER, LYOPHILIZED, FOR SOLUTION INTRAVENOUS at 14:46

## 2018-12-07 NOTE — PROGRESS NOTE ADULT - SUBJECTIVE AND OBJECTIVE BOX
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    PHYSICAL EXAM:  NAD  awake and alert  moist mm  no jvd  cta bl  rrr  soft  left aka  right le erythematous  + PP rle  no cvat    Hospital Medications:   MEDICATIONS  (STANDING):  chlorhexidine 4% Liquid 1 Application(s) Topical <User Schedule>  furosemide    Tablet 20 milliGRAM(s) Oral daily  heparin  Injectable 5000 Unit(s) SubCutaneous every 8 hours  linezolid  IVPB 600 milliGRAM(s) IV Intermittent every 12 hours  linezolid  IVPB      metoprolol succinate ER 25 milliGRAM(s) Oral daily  pantoprazole    Tablet 40 milliGRAM(s) Oral before breakfast  piperacillin/tazobactam IVPB. 3.375 Gram(s) IV Intermittent every 8 hours  potassium chloride    Tablet ER 40 milliEquivalent(s) Oral every 4 hours  silver sulfADIAZINE 1% Cream 1 Application(s) Topical daily  sodium chloride 0.9%. 1000 milliLiter(s) (50 mL/Hr) IV Continuous <Continuous>  spironolactone 25 milliGRAM(s) Oral daily        VITALS:  T(F): 98.6 (12-07-18 @ 06:02), Max: 99.7 (12-06-18 @ 22:05)  HR: 91 (12-07-18 @ 06:02)  BP: 100/52 (12-07-18 @ 06:02)  RR: 16 (12-07-18 @ 06:02)  SpO2: --  Wt(kg): --    12-05 @ 07:01  -  12-06 @ 07:00  --------------------------------------------------------  IN: 0 mL / OUT: 2650 mL / NET: -2650 mL    12-06 @ 07:01  -  12-07 @ 07:00  --------------------------------------------------------  IN: 0 mL / OUT: 2100 mL / NET: -2100 mL    12-07 @ 07:01  -  12-07 @ 15:07  --------------------------------------------------------  IN: 0 mL / OUT: 1000 mL / NET: -1000 mL      Height (cm): 162.56 (12-06 @ 15:51)  Weight (kg): 98.5 (12-06 @ 15:51)  BMI (kg/m2): 37.3 (12-06 @ 15:51)  BSA (m2): 2.03 (12-06 @ 15:51)    LABS:  12-07    135  |  102  |  11  ----------------------------<  52<L>  3.6   |  19  |  0.8    Ca    7.6<L>      07 Dec 2018 06:24  Mg     2.1     12-07    TPro  5.6<L>  /  Alb  2.1<L>  /  TBili  2.0<H>  /  DBili      /  AST  101<H>  /  ALT  71<H>  /  AlkPhos  174<H>  12-07                          12.7   18.89 )-----------( 135      ( 07 Dec 2018 06:24 )             35.7       Urine Studies:        RADIOLOGY & ADDITIONAL STUDIES:            Urine Studies:        RADIOLOGY & ADDITIONAL STUDIES:

## 2018-12-07 NOTE — PROGRESS NOTE ADULT - ASSESSMENT
hyponatremia - resolved  rle clellulitis  left aka  sepsis  PVD  HTN  abnl lft's  cholelithiasis on abd sono      plan:    cont gentle hydration another 24-48 hours  iv abx per ID   no vasc intervention per vasc surgery  trend wbc  f/u ID   f/u bmp  consider GI eval for anbnormal LFT's

## 2018-12-08 LAB
CULTURE RESULTS: SIGNIFICANT CHANGE UP
CULTURE RESULTS: SIGNIFICANT CHANGE UP
SPECIMEN SOURCE: SIGNIFICANT CHANGE UP
SPECIMEN SOURCE: SIGNIFICANT CHANGE UP

## 2018-12-08 RX ADMIN — Medication 1 APPLICATION(S): at 14:26

## 2018-12-08 RX ADMIN — LINEZOLID 300 MILLIGRAM(S): 600 INJECTION, SOLUTION INTRAVENOUS at 06:11

## 2018-12-08 RX ADMIN — PIPERACILLIN AND TAZOBACTAM 25 GRAM(S): 4; .5 INJECTION, POWDER, LYOPHILIZED, FOR SOLUTION INTRAVENOUS at 06:16

## 2018-12-08 RX ADMIN — HEPARIN SODIUM 5000 UNIT(S): 5000 INJECTION INTRAVENOUS; SUBCUTANEOUS at 14:11

## 2018-12-08 RX ADMIN — Medication 25 MILLIGRAM(S): at 06:13

## 2018-12-08 RX ADMIN — PIPERACILLIN AND TAZOBACTAM 25 GRAM(S): 4; .5 INJECTION, POWDER, LYOPHILIZED, FOR SOLUTION INTRAVENOUS at 14:25

## 2018-12-08 RX ADMIN — PANTOPRAZOLE SODIUM 40 MILLIGRAM(S): 20 TABLET, DELAYED RELEASE ORAL at 06:13

## 2018-12-08 RX ADMIN — LINEZOLID 300 MILLIGRAM(S): 600 INJECTION, SOLUTION INTRAVENOUS at 17:34

## 2018-12-08 RX ADMIN — HEPARIN SODIUM 5000 UNIT(S): 5000 INJECTION INTRAVENOUS; SUBCUTANEOUS at 06:13

## 2018-12-08 RX ADMIN — HEPARIN SODIUM 5000 UNIT(S): 5000 INJECTION INTRAVENOUS; SUBCUTANEOUS at 22:29

## 2018-12-08 RX ADMIN — PIPERACILLIN AND TAZOBACTAM 25 GRAM(S): 4; .5 INJECTION, POWDER, LYOPHILIZED, FOR SOLUTION INTRAVENOUS at 22:29

## 2018-12-08 NOTE — PROGRESS NOTE ADULT - SUBJECTIVE AND OBJECTIVE BOX
JUDY PEÑALOZA  63y  Male    Patient is a 63y old  Male who presents with a chief complaint of fever and RLE erythema for 1 week (07 Dec 2018 15:07)    ALLERGIES:  No Known Allergies      INTERVAL HPI/OVERNIGHT EVENTS:    VITALS:  T(F): 96.7 (12-08-18 @ 14:03), Max: 99.6 (12-07-18 @ 22:08)  HR: 72 (12-08-18 @ 14:03) (72 - 97)  BP: 162/71 (12-08-18 @ 14:03) (102/51 - 162/71)  RR: 16 (12-08-18 @ 14:03) (16 - 16)  SpO2: --    LABS:  12-07    135  |  102  |  11  ----------------------------<  52<L>  3.6   |  19  |  0.8    Ca    7.6<L>      07 Dec 2018 06:24  Mg     2.1     12-07    TPro  5.6<L>  /  Alb  2.1<L>  /  TBili  2.0<H>  /  DBili  x   /  AST  101<H>  /  ALT  71<H>  /  AlkPhos  174<H>  12-07    MICROBIOLOGY:    MEDICATION:  acetaminophen   Tablet .. 650 milliGRAM(s) Oral every 6 hours PRN  chlorhexidine 4% Liquid 1 Application(s) Topical <User Schedule>  furosemide    Tablet 20 milliGRAM(s) Oral daily  heparin  Injectable 5000 Unit(s) SubCutaneous every 8 hours  linezolid  IVPB 600 milliGRAM(s) IV Intermittent every 12 hours  linezolid  IVPB      metoprolol succinate ER 25 milliGRAM(s) Oral daily  morphine  - Injectable 1 milliGRAM(s) IV Push every 8 hours PRN  ondansetron Injectable 4 milliGRAM(s) IV Push every 8 hours PRN  pantoprazole    Tablet 40 milliGRAM(s) Oral before breakfast  piperacillin/tazobactam IVPB. 3.375 Gram(s) IV Intermittent every 8 hours  potassium chloride    Tablet ER 40 milliEquivalent(s) Oral every 4 hours  silver sulfADIAZINE 1% Cream 1 Application(s) Topical daily  sodium chloride 0.9%. 1000 milliLiter(s) IV Continuous <Continuous>  spironolactone 25 milliGRAM(s) Oral daily    RADIOLOGY & ADDITIONAL TESTS:

## 2018-12-09 DIAGNOSIS — L97.919 NON-PRESSURE CHRONIC ULCER OF UNSPECIFIED PART OF RIGHT LOWER LEG WITH UNSPECIFIED SEVERITY: ICD-10-CM

## 2018-12-09 LAB
ANION GAP SERPL CALC-SCNC: 9 MMOL/L — SIGNIFICANT CHANGE UP (ref 7–14)
BUN SERPL-MCNC: 8 MG/DL — LOW (ref 10–20)
CALCIUM SERPL-MCNC: 7.8 MG/DL — LOW (ref 8.5–10.1)
CHLORIDE SERPL-SCNC: 105 MMOL/L — SIGNIFICANT CHANGE UP (ref 98–110)
CO2 SERPL-SCNC: 20 MMOL/L — SIGNIFICANT CHANGE UP (ref 17–32)
CREAT SERPL-MCNC: 0.8 MG/DL — SIGNIFICANT CHANGE UP (ref 0.7–1.5)
GLUCOSE SERPL-MCNC: 110 MG/DL — HIGH (ref 70–99)
HCT VFR BLD CALC: 35.6 % — LOW (ref 42–52)
HGB BLD-MCNC: 12.5 G/DL — LOW (ref 14–18)
MCHC RBC-ENTMCNC: 34.2 PG — HIGH (ref 27–31)
MCHC RBC-ENTMCNC: 35.1 G/DL — SIGNIFICANT CHANGE UP (ref 32–37)
MCV RBC AUTO: 97.3 FL — HIGH (ref 80–94)
NRBC # BLD: 0 /100 WBCS — SIGNIFICANT CHANGE UP (ref 0–0)
PLATELET # BLD AUTO: 147 K/UL — SIGNIFICANT CHANGE UP (ref 130–400)
POTASSIUM SERPL-MCNC: 4.7 MMOL/L — SIGNIFICANT CHANGE UP (ref 3.5–5)
POTASSIUM SERPL-SCNC: 4.7 MMOL/L — SIGNIFICANT CHANGE UP (ref 3.5–5)
RBC # BLD: 3.66 M/UL — LOW (ref 4.7–6.1)
RBC # FLD: 13.2 % — SIGNIFICANT CHANGE UP (ref 11.5–14.5)
SODIUM SERPL-SCNC: 134 MMOL/L — LOW (ref 135–146)
WBC # BLD: 8.91 K/UL — SIGNIFICANT CHANGE UP (ref 4.8–10.8)
WBC # FLD AUTO: 8.91 K/UL — SIGNIFICANT CHANGE UP (ref 4.8–10.8)

## 2018-12-09 PROCEDURE — 93926 LOWER EXTREMITY STUDY: CPT | Mod: 26

## 2018-12-09 RX ADMIN — HEPARIN SODIUM 5000 UNIT(S): 5000 INJECTION INTRAVENOUS; SUBCUTANEOUS at 05:30

## 2018-12-09 RX ADMIN — LINEZOLID 300 MILLIGRAM(S): 600 INJECTION, SOLUTION INTRAVENOUS at 17:46

## 2018-12-09 RX ADMIN — Medication 650 MILLIGRAM(S): at 21:48

## 2018-12-09 RX ADMIN — Medication 25 MILLIGRAM(S): at 05:30

## 2018-12-09 RX ADMIN — HEPARIN SODIUM 5000 UNIT(S): 5000 INJECTION INTRAVENOUS; SUBCUTANEOUS at 21:22

## 2018-12-09 RX ADMIN — LINEZOLID 300 MILLIGRAM(S): 600 INJECTION, SOLUTION INTRAVENOUS at 05:31

## 2018-12-09 RX ADMIN — Medication 650 MILLIGRAM(S): at 11:08

## 2018-12-09 RX ADMIN — PANTOPRAZOLE SODIUM 40 MILLIGRAM(S): 20 TABLET, DELAYED RELEASE ORAL at 05:30

## 2018-12-09 RX ADMIN — PIPERACILLIN AND TAZOBACTAM 25 GRAM(S): 4; .5 INJECTION, POWDER, LYOPHILIZED, FOR SOLUTION INTRAVENOUS at 05:31

## 2018-12-09 RX ADMIN — PIPERACILLIN AND TAZOBACTAM 25 GRAM(S): 4; .5 INJECTION, POWDER, LYOPHILIZED, FOR SOLUTION INTRAVENOUS at 21:21

## 2018-12-09 RX ADMIN — HEPARIN SODIUM 5000 UNIT(S): 5000 INJECTION INTRAVENOUS; SUBCUTANEOUS at 13:56

## 2018-12-09 RX ADMIN — CHLORHEXIDINE GLUCONATE 1 APPLICATION(S): 213 SOLUTION TOPICAL at 08:04

## 2018-12-09 RX ADMIN — PIPERACILLIN AND TAZOBACTAM 25 GRAM(S): 4; .5 INJECTION, POWDER, LYOPHILIZED, FOR SOLUTION INTRAVENOUS at 13:55

## 2018-12-09 RX ADMIN — Medication 650 MILLIGRAM(S): at 21:29

## 2018-12-09 RX ADMIN — Medication 650 MILLIGRAM(S): at 11:10

## 2018-12-09 RX ADMIN — Medication 1 APPLICATION(S): at 11:21

## 2018-12-09 RX ADMIN — SODIUM CHLORIDE 50 MILLILITER(S): 9 INJECTION INTRAMUSCULAR; INTRAVENOUS; SUBCUTANEOUS at 17:46

## 2018-12-09 NOTE — DIETITIAN INITIAL EVALUATION ADULT. - ENERGY NEEDS
Calories: 1849-2017kcals/day (MSJ A.F 1.1-1.2) Lower AF due to obesity   Protein: 59-71g/day (1-1.2g/IBW)   Fluid: 1:1ml/kcal

## 2018-12-09 NOTE — DIETITIAN INITIAL EVALUATION ADULT. - OTHER INFO
Reason for assessment: LOS. PMH: GERD, HTN, PVS, edema. Pt is admitted for fever and RLE erythema. Hyponatremia noted on admit but resolved. Pt reports that his UBW is "a couple pounds over 200". Current wt documented 217#. Pt denies wt loss. Last BM was today 12/9. Pt reports that he had diarrhea but it went away. Abdomen noted as soft/nontender. NKFA.

## 2018-12-09 NOTE — PROGRESS NOTE ADULT - SUBJECTIVE AND OBJECTIVE BOX
JUDY PEÑALOZA  63y  Male    Patient is a 63y old  Male who presents with a chief complaint of fever and RLE erythema for 1 week (08 Dec 2018 15:57)    ALLERGIES:  No Known Allergies      INTERVAL HPI/OVERNIGHT EVENTS:    VITALS:  T(F): 96.4 (12-09-18 @ 04:58), Max: 97.9 (12-08-18 @ 22:02)  HR: 93 (12-09-18 @ 04:58) (72 - 99)  BP: 116/50 (12-09-18 @ 04:58) (116/50 - 162/71)  RR: 16 (12-09-18 @ 04:58) (16 - 16)  SpO2: --    LABS:        MICROBIOLOGY:    MEDICATION:  furosemide    Tablet 20 milliGRAM(s) Oral daily  linezolid  IVPB 600 milliGRAM(s) IV Intermittent every 12 hours  linezolid  IVPB        RADIOLOGY & ADDITIONAL TESTS:

## 2018-12-09 NOTE — DIETITIAN INITIAL EVALUATION ADULT. - PHYSICAL APPEARANCE
BMI 37.3 (5'4, 217#). Left BKA. Adjusted body wt= 223#, adjusted BMI 38.3. Bipin score 14. Skin intact. Edema 2+ located on right ankle./obese

## 2018-12-09 NOTE — CONSULT NOTE ADULT - ATTENDING COMMENTS
Right leg ulcers with cellulitis and right femoral and popliteal high grade stenosis. Recommend transfer to [place] for Right leg angiogram. Continue local care per podiatry

## 2018-12-10 PROCEDURE — 99222 1ST HOSP IP/OBS MODERATE 55: CPT

## 2018-12-10 RX ADMIN — HEPARIN SODIUM 5000 UNIT(S): 5000 INJECTION INTRAVENOUS; SUBCUTANEOUS at 05:39

## 2018-12-10 RX ADMIN — PIPERACILLIN AND TAZOBACTAM 25 GRAM(S): 4; .5 INJECTION, POWDER, LYOPHILIZED, FOR SOLUTION INTRAVENOUS at 22:52

## 2018-12-10 RX ADMIN — HEPARIN SODIUM 5000 UNIT(S): 5000 INJECTION INTRAVENOUS; SUBCUTANEOUS at 22:14

## 2018-12-10 RX ADMIN — PIPERACILLIN AND TAZOBACTAM 25 GRAM(S): 4; .5 INJECTION, POWDER, LYOPHILIZED, FOR SOLUTION INTRAVENOUS at 05:40

## 2018-12-10 RX ADMIN — PIPERACILLIN AND TAZOBACTAM 25 GRAM(S): 4; .5 INJECTION, POWDER, LYOPHILIZED, FOR SOLUTION INTRAVENOUS at 14:19

## 2018-12-10 RX ADMIN — LINEZOLID 300 MILLIGRAM(S): 600 INJECTION, SOLUTION INTRAVENOUS at 22:52

## 2018-12-10 RX ADMIN — Medication 1 APPLICATION(S): at 11:06

## 2018-12-10 RX ADMIN — HEPARIN SODIUM 5000 UNIT(S): 5000 INJECTION INTRAVENOUS; SUBCUTANEOUS at 14:10

## 2018-12-10 RX ADMIN — LINEZOLID 300 MILLIGRAM(S): 600 INJECTION, SOLUTION INTRAVENOUS at 05:40

## 2018-12-10 RX ADMIN — PANTOPRAZOLE SODIUM 40 MILLIGRAM(S): 20 TABLET, DELAYED RELEASE ORAL at 05:39

## 2018-12-10 NOTE — PROGRESS NOTE ADULT - EXTREMITIES COMMENTS
right lower ext redness upto thigh
right lower ext  redness pus tender to touch
right lower ext w pus and redness

## 2018-12-10 NOTE — CHART NOTE - NSCHARTNOTEFT_GEN_A_CORE
VA Duplex Low Ext Arterial, Ltd, Right (12.09.18 @ 20:24) >      High-grade stenosis in the distal superficial femoral artery however due   to body habitus and positioning and difficulty with exam this may be an   erroneous finding. An SCOTT PVR examination is recommended if clinically   indicated    ------------------------------------------------------------------------------------------------------    WIll obtain SCOTT/PVR's:  if still shows hi grade stenosis (Rt DP pulse palpable), will speak with Dr. Souza regarding transferring pt [place] for possible intervention. Dr. Garcia aware

## 2018-12-10 NOTE — PROGRESS NOTE ADULT - SUBJECTIVE AND OBJECTIVE BOX
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    advance care directives and advance care planning d/w pt     PHYSICAL EXAM:  NAD  awake and alert  moist mm  no jvd  cta bl  rrr  soft  left aka  right le erythematous  + PP rle  no cvat    Hospital Medications:   MEDICATIONS  (STANDING):  chlorhexidine 4% Liquid 1 Application(s) Topical <User Schedule>  furosemide    Tablet 20 milliGRAM(s) Oral daily  heparin  Injectable 5000 Unit(s) SubCutaneous every 8 hours  linezolid  IVPB 600 milliGRAM(s) IV Intermittent every 12 hours  linezolid  IVPB      metoprolol succinate ER 25 milliGRAM(s) Oral daily  pantoprazole    Tablet 40 milliGRAM(s) Oral before breakfast  piperacillin/tazobactam IVPB. 3.375 Gram(s) IV Intermittent every 8 hours  potassium chloride    Tablet ER 40 milliEquivalent(s) Oral every 4 hours  silver sulfADIAZINE 1% Cream 1 Application(s) Topical daily  sodium chloride 0.9%. 1000 milliLiter(s) (50 mL/Hr) IV Continuous <Continuous>  spironolactone 25 milliGRAM(s) Oral daily        VITALS:  T(F): 96.3 (12-10-18 @ 05:16), Max: 97.1 (12-09-18 @ 14:00)  HR: 100 (12-10-18 @ 05:16)  BP: 117/57 (12-10-18 @ 05:16)  RR: 16 (12-10-18 @ 05:16)  SpO2: --  Wt(kg): --    12-08 @ 07:01  -  12-09 @ 07:00  --------------------------------------------------------  IN: 0 mL / OUT: 500 mL / NET: -500 mL    12-10 @ 07:01  -  12-10 @ 09:57  --------------------------------------------------------  IN: 0 mL / OUT: 1 mL / NET: -1 mL      Height (cm): 162.56 (12-10 @ 09:25)  Weight (kg): 98.5 (12-10 @ 09:25)  BMI (kg/m2): 37.3 (12-10 @ 09:25)  BSA (m2): 2.03 (12-10 @ 09:25)    LABS:  12-09    134<L>  |  105  |  8<L>  ----------------------------<  110<H>  4.7   |  20  |  0.8    Ca    7.8<L>      09 Dec 2018 18:56                            12.5   8.91  )-----------( 147      ( 09 Dec 2018 18:56 )             35.6       Urine Studies:        RADIOLOGY & ADDITIONAL STUDIES:

## 2018-12-10 NOTE — CHART NOTE - NSCHARTNOTEFT_GEN_A_CORE
Received call from vascular PA at [place]: pt with hi grade stenosis of RLE (also with known Left AKA). Recommend transfer to [place] for angiogram later this week. Dr. Garcia aware and agrees with transfer

## 2018-12-10 NOTE — PROGRESS NOTE ADULT - SUBJECTIVE AND OBJECTIVE BOX
JUDY PEÑALOZA  63y  Male    Patient is a 63y old  Male who presents with a chief complaint of fever and RLE erythema for 1 week (09 Dec 2018 14:18)    ALLERGIES:  No Known Allergies      INTERVAL HPI/OVERNIGHT EVENTS:    VITALS:  T(F): 96.3 (12-10-18 @ 05:16), Max: 97.1 (12-09-18 @ 14:00)  HR: 100 (12-10-18 @ 05:16) (91 - 100)  BP: 117/57 (12-10-18 @ 05:16) (108/59 - 117/57)  RR: 16 (12-10-18 @ 05:16) (16 - 18)  SpO2: --    LABS:  12-09    134<L>  |  105  |  8<L>  ----------------------------<  110<H>  4.7   |  20  |  0.8    Ca    7.8<L>      09 Dec 2018 18:56      MICROBIOLOGY:    MEDICATION:  furosemide    Tablet 20 milliGRAM(s) Oral daily  linezolid  IVPB 600 milliGRAM(s) IV Intermittent every 12 hours  linezolid  IVPB        RADIOLOGY & ADDITIONAL TESTS:

## 2018-12-10 NOTE — PROGRESS NOTE ADULT - ASSESSMENT
hyponatremia   rle clellulitis - better  left aka  sepsis  PVD  HTN  abnl lft's  cholelithiasis on abd sono      plan:    dc ivf  low dose lasix and aldactone  iv abx per ID   LE angiogram this week per vascular  f/u labs  abx per ID  full code

## 2018-12-10 NOTE — CHART NOTE - NSCHARTNOTEFT_GEN_A_CORE
Spoke with patient via telephone  #[phone] regarding recommendation for angiogram. Also spoke with patient's sister - both agree to [place] for [place]ogram/further evaluation

## 2018-12-11 PROCEDURE — 99232 SBSQ HOSP IP/OBS MODERATE 35: CPT

## 2018-12-11 RX ORDER — OXYCODONE AND ACETAMINOPHEN 5; 325 MG/1; MG/1
1 TABLET ORAL ONCE
Qty: 0 | Refills: 0 | Status: DISCONTINUED | OUTPATIENT
Start: 2018-12-11 | End: 2018-12-11

## 2018-12-11 RX ADMIN — HEPARIN SODIUM 5000 UNIT(S): 5000 INJECTION INTRAVENOUS; SUBCUTANEOUS at 14:17

## 2018-12-11 RX ADMIN — Medication 1 APPLICATION(S): at 11:03

## 2018-12-11 RX ADMIN — Medication 25 MILLIGRAM(S): at 05:35

## 2018-12-11 RX ADMIN — PIPERACILLIN AND TAZOBACTAM 25 GRAM(S): 4; .5 INJECTION, POWDER, LYOPHILIZED, FOR SOLUTION INTRAVENOUS at 21:28

## 2018-12-11 RX ADMIN — PIPERACILLIN AND TAZOBACTAM 25 GRAM(S): 4; .5 INJECTION, POWDER, LYOPHILIZED, FOR SOLUTION INTRAVENOUS at 14:16

## 2018-12-11 RX ADMIN — PIPERACILLIN AND TAZOBACTAM 25 GRAM(S): 4; .5 INJECTION, POWDER, LYOPHILIZED, FOR SOLUTION INTRAVENOUS at 05:31

## 2018-12-11 RX ADMIN — SPIRONOLACTONE 25 MILLIGRAM(S): 25 TABLET, FILM COATED ORAL at 05:35

## 2018-12-11 RX ADMIN — HEPARIN SODIUM 5000 UNIT(S): 5000 INJECTION INTRAVENOUS; SUBCUTANEOUS at 21:31

## 2018-12-11 RX ADMIN — OXYCODONE AND ACETAMINOPHEN 1 TABLET(S): 5; 325 TABLET ORAL at 22:01

## 2018-12-11 RX ADMIN — LINEZOLID 300 MILLIGRAM(S): 600 INJECTION, SOLUTION INTRAVENOUS at 18:20

## 2018-12-11 RX ADMIN — PANTOPRAZOLE SODIUM 40 MILLIGRAM(S): 20 TABLET, DELAYED RELEASE ORAL at 08:00

## 2018-12-11 RX ADMIN — Medication 20 MILLIGRAM(S): at 05:35

## 2018-12-11 RX ADMIN — OXYCODONE AND ACETAMINOPHEN 1 TABLET(S): 5; 325 TABLET ORAL at 21:31

## 2018-12-11 RX ADMIN — HEPARIN SODIUM 5000 UNIT(S): 5000 INJECTION INTRAVENOUS; SUBCUTANEOUS at 05:35

## 2018-12-11 RX ADMIN — LINEZOLID 300 MILLIGRAM(S): 600 INJECTION, SOLUTION INTRAVENOUS at 08:00

## 2018-12-11 NOTE — PROGRESS NOTE ADULT - SUBJECTIVE AND OBJECTIVE BOX
GENERAL SURGERY PROGRESS NOTE     JUDY PEÑALOZA  63y  Male  Hospital day :8d  POD:  Procedure:   OVERNIGHT EVENTS:  no acute overnight events.   T(F): 98.6 (12-11-18 @ 07:57), Max: 99.1 (12-10-18 @ 18:30)  HR: 97 (12-11-18 @ 07:57) (90 - 107)  BP: 143/62 (12-11-18 @ 07:57) (114/58 - 143/62)  ABP: --  ABP(mean): --  RR: 18 (12-11-18 @ 07:57) (16 - 18)  SpO2: --    DIET/FLUIDS: potassium chloride    Tablet ER 40 milliEquivalent(s) Oral every 4 hours    NG:                                                                                DRAINS:     BM:     EMESIS:     URINE:      GI proph:  pantoprazole    Tablet 40 milliGRAM(s) Oral before breakfast    AC/ proph: heparin  Injectable 5000 Unit(s) SubCutaneous every 8 hours    ABx: linezolid  IVPB 600 milliGRAM(s) IV Intermittent every 12 hours  linezolid  IVPB      piperacillin/tazobactam IVPB. 3.375 Gram(s) IV Intermittent every 8 hours      PHYSICAL EXAM:  GENERAL: NAD, well-appearing  CHEST/LUNG: no obvious increased wob  ABDOMEN: Soft, Nontender, Nondistended;   EXTREMITIES:  R foot ulcer       LABS  Labs:  CAPILLARY BLOOD GLUCOSE                              12.5   8.91  )-----------( 147      ( 09 Dec 2018 18:56 )             35.6         12-09    134<L>  |  105  |  8<L>  ----------------------------<  110<H>  4.7   |  20  |  0.8          LFTs:         Coags:

## 2018-12-11 NOTE — PROGRESS NOTE ADULT - SUBJECTIVE AND OBJECTIVE BOX
NEPHROLOGY FOLLOW UP NOTE    pt seen and examined  transferred to [facility]  no new complaints  no fever, no cp, no sob  right leg less erythematous    PAST MEDICAL & SURGICAL HISTORY:  Circulation disorder of lower extremity: left lower extremity  Edema  GERD (gastroesophageal reflux disease)  Hypertension  S/P BKA (below knee amputation), left    Allergies:  No Known Allergies    Home Medications Reviewed    SOCIAL HISTORY:  Denies ETOH,Smoking,   FAMILY HISTORY:        REVIEW OF SYSTEMS:    All other review of systems is negative unless indicated above.      PHYSICAL EXAM:  NAD  awake and alert  moist mm  no jvd  cta bl  rrr  soft  left aka  right le less erythematous  + PP rle  no cvat    Hospital Medications:   MEDICATIONS  (STANDING):  chlorhexidine 4% Liquid 1 Application(s) Topical <User Schedule>  furosemide    Tablet 20 milliGRAM(s) Oral daily  heparin  Injectable 5000 Unit(s) SubCutaneous every 8 hours  linezolid  IVPB 600 milliGRAM(s) IV Intermittent every 12 hours  linezolid  IVPB      metoprolol succinate ER 25 milliGRAM(s) Oral daily  pantoprazole    Tablet 40 milliGRAM(s) Oral before breakfast  piperacillin/tazobactam IVPB. 3.375 Gram(s) IV Intermittent every 8 hours  potassium chloride    Tablet ER 40 milliEquivalent(s) Oral every 4 hours  silver sulfADIAZINE 1% Cream 1 Application(s) Topical daily  spironolactone 25 milliGRAM(s) Oral daily        VITALS:  T(F): 98.6 (12-11-18 @ 07:57), Max: 99.1 (12-10-18 @ 18:30)  HR: 97 (12-11-18 @ 07:57)  BP: 143/62 (12-11-18 @ 07:57)  RR: 18 (12-11-18 @ 07:57)  SpO2: --  Wt(kg): --    12-10 @ 07:01  -  12-11 @ 07:00  --------------------------------------------------------  IN: 0 mL / OUT: 1 mL / NET: -1 mL    12-11 @ 07:01  -  12-11 @ 12:09  --------------------------------------------------------  IN: 240 mL / OUT: 1400 mL / NET: -1160 mL          LABS:  12-09    134<L>  |  105  |  8<L>  ----------------------------<  110<H>  4.7   |  20  |  0.8    Ca    7.8<L>      09 Dec 2018 18:56                            12.5   8.91  )-----------( 147      ( 09 Dec 2018 18:56 )             35.6       Urine Studies:        RADIOLOGY & ADDITIONAL STUDIES: no

## 2018-12-11 NOTE — PROGRESS NOTE ADULT - ASSESSMENT
hyponatremia - mild  rle cellulitis - better  left aka  sepsis  PVD - high grade rle stenosis  HTN  abnl lft's  cholelithiasis on abd sono      plan:    cont low dose lasix and aldactone  iv abx per ID - linezolid and zosyn  LE angiogram this week per vascular  f/u labs  monitor bp's  cont lopressor  wound care  f/u lft's

## 2018-12-11 NOTE — PROGRESS NOTE ADULT - ASSESSMENT
JUDY PEÑALOZA 63y Male  MRN#: [id number]   CODE STATUS: Full code      SUBJECTIVE  Patient is a 63y old Male who presented with a chief complaint of fever and RLE erythema for 1 week  Currently admitted to medicine with the primary diagnosis of right lower extremity cellulitis 2/2 PAD  Today is hospital day 8d, and this morning he is resting comfortably in bed and reports no overnight events. Patient reports tenderness at right lower leg. Patient denies fever/chills, chest pain, shortness of breath, and abdominal pain.      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 3.375 Gram(s) IV Intermittent every 8 hours  potassium chloride    Tablet ER 40 milliEquivalent(s) Oral every 4 hours  silver sulfADIAZINE 1% Cream 1 Application(s) Topical daily  spironolactone 25 milliGRAM(s) Oral daily    PRN MEDICATIONS  acetaminophen   Tablet .. 650 milliGRAM(s) Oral every 6 hours PRN  ondansetron Injectable 4 milliGRAM(s) IV Push every 8 hours PRN      VITAL SIGNS: Last 24 Hours  T(C): 37 (11 Dec 2018 07:57), Max: 37.3 (10 Dec 2018 18:30)  T(F): 98.6 (11 Dec 2018 07:57), Max: 99.1 (10 Dec 2018 18:30)  HR: 97 (11 Dec 2018 07:57) (90 - 107)  BP: 143/62 (11 Dec 2018 07:57) (114/58 - 143/62)  BP(mean): --  RR: 18 (11 Dec 2018 07:57) (16 - 18)  SpO2: --    LABS:                        12.5   8.91  )-----------( 147      ( 09 Dec 2018 18:56 )             35.6     12-09    134<L>  |  105  |  8<L>  ----------------------------<  110<H>  4.7   |  20  |  0.8    Ca    7.8<L>      09 Dec 2018 18:56    RADIOLOGY:  < from: VA Duplex Low Ext Arterial, Ltd, Right (12.09.18 @ 20:24) >  High-grade stenosis in the distal superficial femoral artery however due   to body habitus and positioning and difficulty with exam this may be an   erroneous finding. An SCOTT PVR examination is recommended if clinically   indicated    < end of copied text >    < from: CT Lower Extremity No Cont, Right (12.03.18 @ 14:39) >  1. No CT evidence of necrotizing fasciitis  2. Diffuse cellulitis from mid third of leg to foot with soft tissue   ulcerations along medial lateral distal leg  3. Medial tibial periostitis worst at the distal third consistent with   osteitis    < end of copied text >    < from: US Abdomen Limited (12.03.18 @ 14:12) >  Cholelithiasis without intra or extrahepatic biliary ductal dilatation.    < end of copied text >      PHYSICAL EXAM:    GENERAL: NAD, well-developed, AAOx3  HEENT:  Atraumatic, Normocephalic. Conjunctiva and sclera clear, No JVD  PULMONARY: Clear to auscultation bilaterally;  CARDIOVASCULAR: Regular rate and rhythm; No murmurs  GASTROINTESTINAL: Obese abdomen, soft, nontender, nondistended; Bowel sounds present  MUSCULOSKELETAL:  2+ pulse on RLE, Erythema, swelling, and tenderness on right calf, with multiple medial ulcers, with mild exudates soaking through the dressing.  NEUROLOGY: non-focal  SKIN: No rashes or lesions      ADMISSION SUMMARY  Patient is a 63y old Male who presented with a chief complaint of fever and RLE erythema for 1 week  Currently admitted to medicine with the primary diagnosis of right lower extremity cellulitis and ulcer 2/2 PAD.       ASSESSMENT & PLAN  62yo male with past medical history of HTN, PAD s/p AKA, and GERD presented with fever and worsening lower extremity cellulitis, transferred from [facility] for right leg angiogram    #) Cellulitis with nonhealing ulcer  - No signs of systemic infection  - Likely 2/2 peripheral arterial disease, arterial duplex shows high grade stenosis on distal superficial femoral artery  - Vascular following: OR angiogram on Friday, will need optimization for OR, EKG, CXR, type and screen, routine labs  - ID following: Continue zyvox and zosyn for now  - Continue wound care daily  - Pain control PRN    #) Congestive heart failure with unknown EF  - No signs of decompensation  - Continue Toprol, lasix, and aldactone    #) Hypertension  - BP well controlled  - Continue toprol    #) Transaminitis  - US abdominal shows cholelithiasis  - Continue to trend LFTs  - Avoid any hepatotoxic drugs    #) GERD  - Continue PPI      DVT ppx: heparin subQ  GI ppx: Protonix  Diet: DASH  Activity: Ambulate as tolerated  Lines: Peripheral IVs  Code status: Full code  Dispo: Acute, OR angiogram on Friday with vascular

## 2018-12-11 NOTE — PROGRESS NOTE ADULT - ASSESSMENT
63M    Hypertension  S/P BKA left    Admitted with Severe Sepsis   RLE cellulitis/lymphangitis  12/3 bcx NGTD  CT LE No CT evidence of necrotizing fasciitis. Diffuse cellulitis from mid third of leg to foot with soft tissue ulcerations along medial lateral distal leg. Medial tibial periostitis worst at the distal third consistent with osteitis  Arterial Duplex High-grade stenosis in the distal superficial femoral artery    - ESR/CRP  - Zosyn/linezolid (on abx since 12/3)  - Vascular following- plan for angio    Spectra [phone]    Hypertension  S/P BKA left    Admitted with Severe Sepsis   RLE cellulitis/lymphangitis  12/3 bcx NGTD  CT LE No CT evidence of necrotizing fasciitis. Diffuse cellulitis from mid third of leg to foot with soft tissue ulcerations along medial lateral distal leg. Medial tibial periostitis worst at the distal third consistent with osteitis  Arterial Duplex High-grade stenosis in the distal superficial femoral artery    - ESR/CRP  - HIV, HCV Ab screen  - Zosyn/linezolid (on abx since 12/3)  - Vascular following- plan for angio    Spectra [phone]

## 2018-12-11 NOTE — PROGRESS NOTE ADULT - SUBJECTIVE AND OBJECTIVE BOX
JUDY PEÑALOZA  63y, Male      OVERNIGHT EVENTS:  afebrile    ROS negative except as per above    VITALS:  T(F): 98.6, Max: 99.1 (12-10-18 @ 18:30)  HR: 97  BP: 143/62  RR: 18Vital Signs Last 24 Hrs  T(C): 37 (11 Dec 2018 07:57), Max: 37.3 (10 Dec 2018 18:30)  T(F): 98.6 (11 Dec 2018 07:57), Max: 99.1 (10 Dec 2018 18:30)  HR: 97 (11 Dec 2018 07:57) (90 - 107)  BP: 143/62 (11 Dec 2018 07:57) (114/58 - 143/62)  BP(mean): --  RR: 18 (11 Dec 2018 07:57) (16 - 18)  SpO2: --    PHYSICAL EXAM  Gen: Awake and alert, non-toxic appearing, NAD  HEENT: NCAT.  CV: RRR,  Lungs: CTAB, no w/r/r  Abd: Soft. NTND  Extr: wwp, LLE dressings  Skin: no rash  Neuro: No focal deficits  Lines: clean      TESTS & MEASUREMENTS:                        12.5   8.91  )-----------( 147      ( 09 Dec 2018 18:56 )             35.6     12-09    134<L>  |  105  |  8<L>  ----------------------------<  110<H>  4.7   |  20  |  0.8    Ca    7.8<L>      09 Dec 2018 18:56              RADIOLOGY & ADDITIONAL TESTS:    ANTIBIOTICS:    clindamycin IVPB   100 mL/Hr IV Intermittent (12-03-18 @ 12:10)    clindamycin IVPB   100 mL/Hr IV Intermittent (12-05-18 @ 21:50)   100 mL/Hr IV Intermittent (12-05-18 @ 14:55)   100 mL/Hr IV Intermittent (12-05-18 @ 05:22)   100 mL/Hr IV Intermittent (12-04-18 @ 21:18)   100 mL/Hr IV Intermittent (12-04-18 @ 13:48)   100 mL/Hr IV Intermittent (12-04-18 @ 05:07)   100 mL/Hr IV Intermittent (12-03-18 @ 21:14)    linezolid  IVPB   300 mL/Hr IV Intermittent (12-06-18 @ 00:25)    linezolid  IVPB   300 mL/Hr IV Intermittent (12-11-18 @ 08:00)   300 mL/Hr IV Intermittent (12-10-18 @ 22:52)   300 mL/Hr IV Intermittent (12-10-18 @ 05:40)   300 mL/Hr IV Intermittent (12-09-18 @ 17:46)   300 mL/Hr IV Intermittent (12-09-18 @ 05:31)   300 mL/Hr IV Intermittent (12-08-18 @ 17:34)   300 mL/Hr IV Intermittent (12-08-18 @ 06:11)   300 mL/Hr IV Intermittent (12-07-18 @ 17:28)   300 mL/Hr IV Intermittent (12-07-18 @ 06:58)   300 mL/Hr IV Intermittent (12-06-18 @ 17:45)   300 mL/Hr IV Intermittent (12-06-18 @ 06:07)    piperacillin/tazobactam IVPB.   25 mL/Hr IV Intermittent (12-11-18 @ 05:31)   25 mL/Hr IV Intermittent (12-10-18 @ 22:52)   25 mL/Hr IV Intermittent (12-10-18 @ 14:19)   25 mL/Hr IV Intermittent (12-10-18 @ 05:40)   25 mL/Hr IV Intermittent (12-09-18 @ 21:21)   25 mL/Hr IV Intermittent (12-09-18 @ 13:55)   25 mL/Hr IV Intermittent (12-09-18 @ 05:31)   25 mL/Hr IV Intermittent (12-08-18 @ 22:29)   25 mL/Hr IV Intermittent (12-08-18 @ 14:25)   25 mL/Hr IV Intermittent (12-08-18 @ 06:16)   25 mL/Hr IV Intermittent (12-07-18 @ 22:01)   25 mL/Hr IV Intermittent (12-07-18 @ 14:46)   25 mL/Hr IV Intermittent (12-07-18 @ 06:58)   25 mL/Hr IV Intermittent (12-06-18 @ 22:42)   25 mL/Hr IV Intermittent (12-06-18 @ 14:31)   25 mL/Hr IV Intermittent (12-06-18 @ 06:07)   25 mL/Hr IV Intermittent (12-05-18 @ 21:49)   25 mL/Hr IV Intermittent (12-05-18 @ 14:55)   25 mL/Hr IV Intermittent (12-05-18 @ 05:22)   25 mL/Hr IV Intermittent (12-04-18 @ 21:18)   25 mL/Hr IV Intermittent (12-04-18 @ 13:48)   25 mL/Hr IV Intermittent (12-04-18 @ 05:07)   25 mL/Hr IV Intermittent (12-03-18 @ 21:18)    piperacillin/tazobactam IVPB.   200 mL/Hr IV Intermittent (12-03-18 @ 12:16)        linezolid  IVPB 600 milliGRAM(s) IV Intermittent every 12 hours  linezolid  IVPB      piperacillin/tazobactam IVPB. 3.375 Gram(s) IV Intermittent every 8 hours JUDY PEÑALOZA  63y, Male      OVERNIGHT EVENTS:  afebrile    ROS negative except as per above    VITALS:  T(F): 98.6, Max: 99.1 (12-10-18 @ 18:30)  HR: 97  BP: 143/62  RR: 18Vital Signs Last 24 Hrs  T(C): 37 (11 Dec 2018 07:57), Max: 37.3 (10 Dec 2018 18:30)  T(F): 98.6 (11 Dec 2018 07:57), Max: 99.1 (10 Dec 2018 18:30)  HR: 97 (11 Dec 2018 07:57) (90 - 107)  BP: 143/62 (11 Dec 2018 07:57) (114/58 - 143/62)  BP(mean): --  RR: 18 (11 Dec 2018 07:57) (16 - 18)  SpO2: --    PHYSICAL EXAM  Gen: Awake and alert, non-toxic appearing, NAD  HEENT: NCAT.  CV: RRR,  Lungs: CTAB, no w/r/r  Abd: Soft. NTND  Extr: wwp, RLE with decreased swelling, chronic stasis changes, dorsal macerated area, no purulence, + erythema  Skin: no rash  Neuro: No focal deficits  Lines: clean      TESTS & MEASUREMENTS:                        12.5   8.91  )-----------( 147      ( 09 Dec 2018 18:56 )             35.6     12-09    134<L>  |  105  |  8<L>  ----------------------------<  110<H>  4.7   |  20  |  0.8    Ca    7.8<L>      09 Dec 2018 18:56              RADIOLOGY & ADDITIONAL TESTS:    ANTIBIOTICS:    clindamycin IVPB   100 mL/Hr IV Intermittent (12-03-18 @ 12:10)    clindamycin IVPB   100 mL/Hr IV Intermittent (12-05-18 @ 21:50)   100 mL/Hr IV Intermittent (12-05-18 @ 14:55)   100 mL/Hr IV Intermittent (12-05-18 @ 05:22)   100 mL/Hr IV Intermittent (12-04-18 @ 21:18)   100 mL/Hr IV Intermittent (12-04-18 @ 13:48)   100 mL/Hr IV Intermittent (12-04-18 @ 05:07)   100 mL/Hr IV Intermittent (12-03-18 @ 21:14)    linezolid  IVPB   300 mL/Hr IV Intermittent (12-06-18 @ 00:25)    linezolid  IVPB   300 mL/Hr IV Intermittent (12-11-18 @ 08:00)   300 mL/Hr IV Intermittent (12-10-18 @ 22:52)   300 mL/Hr IV Intermittent (12-10-18 @ 05:40)   300 mL/Hr IV Intermittent (12-09-18 @ 17:46)   300 mL/Hr IV Intermittent (12-09-18 @ 05:31)   300 mL/Hr IV Intermittent (12-08-18 @ 17:34)   300 mL/Hr IV Intermittent (12-08-18 @ 06:11)   300 mL/Hr IV Intermittent (12-07-18 @ 17:28)   300 mL/Hr IV Intermittent (12-07-18 @ 06:58)   300 mL/Hr IV Intermittent (12-06-18 @ 17:45)   300 mL/Hr IV Intermittent (12-06-18 @ 06:07)    piperacillin/tazobactam IVPB.   25 mL/Hr IV Intermittent (12-11-18 @ 05:31)   25 mL/Hr IV Intermittent (12-10-18 @ 22:52)   25 mL/Hr IV Intermittent (12-10-18 @ 14:19)   25 mL/Hr IV Intermittent (12-10-18 @ 05:40)   25 mL/Hr IV Intermittent (12-09-18 @ 21:21)   25 mL/Hr IV Intermittent (12-09-18 @ 13:55)   25 mL/Hr IV Intermittent (12-09-18 @ 05:31)   25 mL/Hr IV Intermittent (12-08-18 @ 22:29)   25 mL/Hr IV Intermittent (12-08-18 @ 14:25)   25 mL/Hr IV Intermittent (12-08-18 @ 06:16)   25 mL/Hr IV Intermittent (12-07-18 @ 22:01)   25 mL/Hr IV Intermittent (12-07-18 @ 14:46)   25 mL/Hr IV Intermittent (12-07-18 @ 06:58)   25 mL/Hr IV Intermittent (12-06-18 @ 22:42)   25 mL/Hr IV Intermittent (12-06-18 @ 14:31)   25 mL/Hr IV Intermittent (12-06-18 @ 06:07)   25 mL/Hr IV Intermittent (12-05-18 @ 21:49)   25 mL/Hr IV Intermittent (12-05-18 @ 14:55)   25 mL/Hr IV Intermittent (12-05-18 @ 05:22)   25 mL/Hr IV Intermittent (12-04-18 @ 21:18)   25 mL/Hr IV Intermittent (12-04-18 @ 13:48)   25 mL/Hr IV Intermittent (12-04-18 @ 05:07)   25 mL/Hr IV Intermittent (12-03-18 @ 21:18)    piperacillin/tazobactam IVPB.   200 mL/Hr IV Intermittent (12-03-18 @ 12:16)        linezolid  IVPB 600 milliGRAM(s) IV Intermittent every 12 hours  linezolid  IVPB      piperacillin/tazobactam IVPB. 3.375 Gram(s) IV Intermittent every 8 hours

## 2018-12-11 NOTE — PROGRESS NOTE ADULT - SUBJECTIVE AND OBJECTIVE BOX
Patient was seen and examined. Spoke with RN. Chart reviewed.    No events overnight.  Vital Signs Last 24 Hrs  T(F): 98.6 (11 Dec 2018 07:57), Max: 99.1 (10 Dec 2018 18:30)  HR: 97 (11 Dec 2018 07:57) (90 - 107)  BP: 143/62 (11 Dec 2018 07:57) (114/58 - 143/62)  SpO2: --  MEDICATIONS  (STANDING):  chlorhexidine 4% Liquid 1 Application(s) Topical <User Schedule>  furosemide    Tablet 20 milliGRAM(s) Oral daily  heparin  Injectable 5000 Unit(s) SubCutaneous every 8 hours  linezolid  IVPB 600 milliGRAM(s) IV Intermittent every 12 hours  linezolid  IVPB      metoprolol succinate ER 25 milliGRAM(s) Oral daily  pantoprazole    Tablet 40 milliGRAM(s) Oral before breakfast  piperacillin/tazobactam IVPB. 3.375 Gram(s) IV Intermittent every 8 hours  potassium chloride    Tablet ER 40 milliEquivalent(s) Oral every 4 hours  silver sulfADIAZINE 1% Cream 1 Application(s) Topical daily  spironolactone 25 milliGRAM(s) Oral daily    MEDICATIONS  (PRN):  acetaminophen   Tablet .. 650 milliGRAM(s) Oral every 6 hours PRN Temp greater or equal to 38C (100.4F), Mild Pain (1 - 3)  ondansetron Injectable 4 milliGRAM(s) IV Push every 8 hours PRN Nausea and/or Vomiting    Labs:                        12.5   8.91  )-----------( 147      ( 09 Dec 2018 18:56 )             35.6     09 Dec 2018 18:56    134    |  105    |  8      ----------------------------<  110    4.7     |  20     |  0.8      Ca    7.8        09 Dec 2018 18:56              Radiology:    General: comfortable, NAD  Neurology: A&Ox3, nonfocal  Head:  Normocephalic, atraumatic  ENT:  Mucosa moist, no ulcerations  Neck:  Supple, no JVD,  Resp: CTA B/L  CV: RRR, S1S2,   GI: Soft, NT, bowel sounds  MSaka,: Rt chr stasis,edema, + peripheral pulses, FROM all 4 extremity

## 2018-12-11 NOTE — PROGRESS NOTE ADULT - ASSESSMENT
· Assessment		  hyponatremia - mild  rle cellulitis - better  left aka  sepsis  PVD - high grade rle stenosis  HTN  abnl lft's  cholelithiasis on abd sono      plan:    cont low dose lasix and aldactone  iv abx per ID - linezolid and zosyn  LE angiogram this week per vascular  f/u labs  monitor bp's  cont lopressor  wound care  f/u lft's

## 2018-12-11 NOTE — PROGRESS NOTE ADULT - ASSESSMENT
62 yo M with High grade R SFA stenosis and R foot ulcer    PLAN:    - OR Friday for angiogram, preop on Thursday NPO, active T&S    - local wound care     - cont antibiotics per ID 62 yo M with High grade R SFA stenosis and R foot ulcer    PLAN:    - OR thursday for angiogram, preop on Wednesday, NPO, active T&S    - local wound care     - cont antibiotics per ID

## 2018-12-12 LAB
ANION GAP SERPL CALC-SCNC: 11 MMOL/L — SIGNIFICANT CHANGE UP (ref 7–14)
ANION GAP SERPL CALC-SCNC: 12 MMOL/L — SIGNIFICANT CHANGE UP (ref 7–14)
ANION GAP SERPL CALC-SCNC: 14 MMOL/L — SIGNIFICANT CHANGE UP (ref 7–14)
APTT BLD: 30.7 SEC — SIGNIFICANT CHANGE UP (ref 27–39.2)
BASOPHILS # BLD AUTO: 0.02 K/UL — SIGNIFICANT CHANGE UP (ref 0–0.2)
BASOPHILS NFR BLD AUTO: 0.3 % — SIGNIFICANT CHANGE UP (ref 0–1)
BLD GP AB SCN SERPL QL: SIGNIFICANT CHANGE UP
BUN SERPL-MCNC: 11 MG/DL — SIGNIFICANT CHANGE UP (ref 10–20)
BUN SERPL-MCNC: 11 MG/DL — SIGNIFICANT CHANGE UP (ref 10–20)
BUN SERPL-MCNC: 9 MG/DL — LOW (ref 10–20)
CALCIUM SERPL-MCNC: 8.5 MG/DL — SIGNIFICANT CHANGE UP (ref 8.5–10.1)
CALCIUM SERPL-MCNC: 8.5 MG/DL — SIGNIFICANT CHANGE UP (ref 8.5–10.1)
CALCIUM SERPL-MCNC: 8.7 MG/DL — SIGNIFICANT CHANGE UP (ref 8.5–10.1)
CHLORIDE SERPL-SCNC: 101 MMOL/L — SIGNIFICANT CHANGE UP (ref 98–110)
CHLORIDE SERPL-SCNC: 95 MMOL/L — LOW (ref 98–110)
CHLORIDE SERPL-SCNC: 96 MMOL/L — LOW (ref 98–110)
CO2 SERPL-SCNC: 20 MMOL/L — SIGNIFICANT CHANGE UP (ref 17–32)
CO2 SERPL-SCNC: 23 MMOL/L — SIGNIFICANT CHANGE UP (ref 17–32)
CO2 SERPL-SCNC: 24 MMOL/L — SIGNIFICANT CHANGE UP (ref 17–32)
CREAT SERPL-MCNC: 0.7 MG/DL — SIGNIFICANT CHANGE UP (ref 0.7–1.5)
CREAT SERPL-MCNC: 0.8 MG/DL — SIGNIFICANT CHANGE UP (ref 0.7–1.5)
CREAT SERPL-MCNC: 0.9 MG/DL — SIGNIFICANT CHANGE UP (ref 0.7–1.5)
CRP SERPL-MCNC: 8.86 MG/DL — HIGH (ref 0–0.4)
EOSINOPHIL # BLD AUTO: 0.05 K/UL — SIGNIFICANT CHANGE UP (ref 0–0.7)
EOSINOPHIL NFR BLD AUTO: 0.8 % — SIGNIFICANT CHANGE UP (ref 0–8)
ERYTHROCYTE [SEDIMENTATION RATE] IN BLOOD: >140 MM/HR — SIGNIFICANT CHANGE UP (ref 0–10)
GLUCOSE SERPL-MCNC: 114 MG/DL — HIGH (ref 70–99)
GLUCOSE SERPL-MCNC: 67 MG/DL — LOW (ref 70–99)
GLUCOSE SERPL-MCNC: 77 MG/DL — SIGNIFICANT CHANGE UP (ref 70–99)
HCT VFR BLD CALC: 37 % — LOW (ref 42–52)
HGB BLD-MCNC: 12.7 G/DL — LOW (ref 14–18)
HIV 1+2 AB+HIV1 P24 AG SERPL QL IA: SIGNIFICANT CHANGE UP
IMM GRANULOCYTES NFR BLD AUTO: 0.7 % — HIGH (ref 0.1–0.3)
INR BLD: 1.37 RATIO — HIGH (ref 0.65–1.3)
LYMPHOCYTES # BLD AUTO: 1.3 K/UL — SIGNIFICANT CHANGE UP (ref 1.2–3.4)
LYMPHOCYTES # BLD AUTO: 21.2 % — SIGNIFICANT CHANGE UP (ref 20.5–51.1)
MAGNESIUM SERPL-MCNC: 1.6 MG/DL — LOW (ref 1.8–2.4)
MAGNESIUM SERPL-MCNC: 1.7 MG/DL — LOW (ref 1.8–2.4)
MAGNESIUM SERPL-MCNC: 1.7 MG/DL — LOW (ref 1.8–2.4)
MCHC RBC-ENTMCNC: 33.9 PG — HIGH (ref 27–31)
MCHC RBC-ENTMCNC: 34.3 G/DL — SIGNIFICANT CHANGE UP (ref 32–37)
MCV RBC AUTO: 98.7 FL — HIGH (ref 80–94)
MONOCYTES # BLD AUTO: 0.31 K/UL — SIGNIFICANT CHANGE UP (ref 0.1–0.6)
MONOCYTES NFR BLD AUTO: 5.1 % — SIGNIFICANT CHANGE UP (ref 1.7–9.3)
NEUTROPHILS # BLD AUTO: 4.41 K/UL — SIGNIFICANT CHANGE UP (ref 1.4–6.5)
NEUTROPHILS NFR BLD AUTO: 71.9 % — SIGNIFICANT CHANGE UP (ref 42.2–75.2)
NRBC # BLD: 0 /100 WBCS — SIGNIFICANT CHANGE UP (ref 0–0)
PHOSPHATE SERPL-MCNC: 2.9 MG/DL — SIGNIFICANT CHANGE UP (ref 2.1–4.9)
PLATELET # BLD AUTO: 154 K/UL — SIGNIFICANT CHANGE UP (ref 130–400)
POTASSIUM SERPL-MCNC: 4.5 MMOL/L — SIGNIFICANT CHANGE UP (ref 3.5–5)
POTASSIUM SERPL-MCNC: 4.6 MMOL/L — SIGNIFICANT CHANGE UP (ref 3.5–5)
POTASSIUM SERPL-MCNC: 5.3 MMOL/L — HIGH (ref 3.5–5)
POTASSIUM SERPL-SCNC: 4.5 MMOL/L — SIGNIFICANT CHANGE UP (ref 3.5–5)
POTASSIUM SERPL-SCNC: 4.6 MMOL/L — SIGNIFICANT CHANGE UP (ref 3.5–5)
POTASSIUM SERPL-SCNC: 5.3 MMOL/L — HIGH (ref 3.5–5)
PROTHROM AB SERPL-ACNC: 15.7 SEC — HIGH (ref 9.95–12.87)
RBC # BLD: 3.75 M/UL — LOW (ref 4.7–6.1)
RBC # FLD: 13.1 % — SIGNIFICANT CHANGE UP (ref 11.5–14.5)
SODIUM SERPL-SCNC: 129 MMOL/L — LOW (ref 135–146)
SODIUM SERPL-SCNC: 131 MMOL/L — LOW (ref 135–146)
SODIUM SERPL-SCNC: 136 MMOL/L — SIGNIFICANT CHANGE UP (ref 135–146)
TYPE + AB SCN PNL BLD: SIGNIFICANT CHANGE UP
WBC # BLD: 6.13 K/UL — SIGNIFICANT CHANGE UP (ref 4.8–10.8)
WBC # FLD AUTO: 6.13 K/UL — SIGNIFICANT CHANGE UP (ref 4.8–10.8)

## 2018-12-12 RX ORDER — MAGNESIUM SULFATE 500 MG/ML
2 VIAL (ML) INJECTION ONCE
Qty: 0 | Refills: 0 | Status: COMPLETED | OUTPATIENT
Start: 2018-12-12 | End: 2018-12-12

## 2018-12-12 RX ORDER — SALICYLIC ACID 0.5 %
1 CLEANSER (GRAM) TOPICAL EVERY 6 HOURS
Qty: 0 | Refills: 0 | Status: DISCONTINUED | OUTPATIENT
Start: 2018-12-12 | End: 2018-12-13

## 2018-12-12 RX ORDER — OXYCODONE HYDROCHLORIDE 5 MG/1
5 TABLET ORAL EVERY 8 HOURS
Qty: 0 | Refills: 0 | Status: DISCONTINUED | OUTPATIENT
Start: 2018-12-12 | End: 2018-12-13

## 2018-12-12 RX ORDER — SODIUM CHLORIDE 9 MG/ML
1000 INJECTION INTRAMUSCULAR; INTRAVENOUS; SUBCUTANEOUS
Qty: 0 | Refills: 0 | Status: DISCONTINUED | OUTPATIENT
Start: 2018-12-12 | End: 2018-12-13

## 2018-12-12 RX ORDER — MAGNESIUM SULFATE 500 MG/ML
2 VIAL (ML) INJECTION EVERY 4 HOURS
Qty: 0 | Refills: 0 | Status: COMPLETED | OUTPATIENT
Start: 2018-12-12 | End: 2018-12-13

## 2018-12-12 RX ADMIN — LINEZOLID 300 MILLIGRAM(S): 600 INJECTION, SOLUTION INTRAVENOUS at 17:51

## 2018-12-12 RX ADMIN — Medication 1 APPLICATION(S): at 12:38

## 2018-12-12 RX ADMIN — LINEZOLID 300 MILLIGRAM(S): 600 INJECTION, SOLUTION INTRAVENOUS at 05:10

## 2018-12-12 RX ADMIN — HEPARIN SODIUM 5000 UNIT(S): 5000 INJECTION INTRAVENOUS; SUBCUTANEOUS at 14:37

## 2018-12-12 RX ADMIN — Medication 20 MILLIGRAM(S): at 05:09

## 2018-12-12 RX ADMIN — PANTOPRAZOLE SODIUM 40 MILLIGRAM(S): 20 TABLET, DELAYED RELEASE ORAL at 05:10

## 2018-12-12 RX ADMIN — HEPARIN SODIUM 5000 UNIT(S): 5000 INJECTION INTRAVENOUS; SUBCUTANEOUS at 21:38

## 2018-12-12 RX ADMIN — Medication 50 GRAM(S): at 22:14

## 2018-12-12 RX ADMIN — HEPARIN SODIUM 5000 UNIT(S): 5000 INJECTION INTRAVENOUS; SUBCUTANEOUS at 05:10

## 2018-12-12 RX ADMIN — PIPERACILLIN AND TAZOBACTAM 25 GRAM(S): 4; .5 INJECTION, POWDER, LYOPHILIZED, FOR SOLUTION INTRAVENOUS at 05:10

## 2018-12-12 RX ADMIN — PIPERACILLIN AND TAZOBACTAM 25 GRAM(S): 4; .5 INJECTION, POWDER, LYOPHILIZED, FOR SOLUTION INTRAVENOUS at 21:37

## 2018-12-12 RX ADMIN — Medication 25 GRAM(S): at 12:40

## 2018-12-12 RX ADMIN — Medication 1 APPLICATION(S): at 17:04

## 2018-12-12 RX ADMIN — SPIRONOLACTONE 25 MILLIGRAM(S): 25 TABLET, FILM COATED ORAL at 05:10

## 2018-12-12 RX ADMIN — PIPERACILLIN AND TAZOBACTAM 25 GRAM(S): 4; .5 INJECTION, POWDER, LYOPHILIZED, FOR SOLUTION INTRAVENOUS at 14:33

## 2018-12-12 RX ADMIN — Medication 25 MILLIGRAM(S): at 05:10

## 2018-12-12 NOTE — PROGRESS NOTE ADULT - SUBJECTIVE AND OBJECTIVE BOX
JUDY PEÑALOZA  63y, Male      OVERNIGHT EVENTS:  no acute events overnight    ROS negative except as per above    VITALS:  T(F): 96.8, Max: 98.3 (12-11-18 @ 16:00)  HR: 87  BP: 117/56  RR: 18Vital Signs Last 24 Hrs  T(C): 36 (12 Dec 2018 07:51), Max: 36.8 (11 Dec 2018 16:00)  T(F): 96.8 (12 Dec 2018 07:51), Max: 98.3 (11 Dec 2018 16:00)  HR: 87 (12 Dec 2018 07:51) (87 - 97)  BP: 117/56 (12 Dec 2018 07:51) (100/54 - 129/57)  BP(mean): --  RR: 18 (12 Dec 2018 07:51) (18 - 18)  SpO2: --    PHYSICAL EXAM  Gen: Awake and alert, non-toxic appearing, NAD  HEENT: NCAT.  CV: RRR,  Lungs: CTAB, no w/r/r  Abd: Soft. NTND  Extr: wwp, RLE with decreased swelling, chronic stasis changes, dorsal macerated area, no purulence, + erythema  Skin: no rash  Neuro: No focal deficits  Lines: clean      TESTS & MEASUREMENTS:                        12.7   6.13  )-----------( 154      ( 12 Dec 2018 05:07 )             37.0     12-12    136  |  101  |  9<L>  ----------------------------<  67<L>  5.3<H>   |  23  |  0.7    Ca    8.7      12 Dec 2018 05:07  Mg     1.7     12-12              RADIOLOGY & ADDITIONAL TESTS:    ANTIBIOTICS:    clindamycin IVPB   100 mL/Hr IV Intermittent (12-03-18 @ 12:10)    clindamycin IVPB   100 mL/Hr IV Intermittent (12-05-18 @ 21:50)   100 mL/Hr IV Intermittent (12-05-18 @ 14:55)   100 mL/Hr IV Intermittent (12-05-18 @ 05:22)   100 mL/Hr IV Intermittent (12-04-18 @ 21:18)   100 mL/Hr IV Intermittent (12-04-18 @ 13:48)   100 mL/Hr IV Intermittent (12-04-18 @ 05:07)   100 mL/Hr IV Intermittent (12-03-18 @ 21:14)    linezolid  IVPB   300 mL/Hr IV Intermittent (12-06-18 @ 00:25)    linezolid  IVPB   300 mL/Hr IV Intermittent (12-12-18 @ 05:10)   300 mL/Hr IV Intermittent (12-11-18 @ 18:20)   300 mL/Hr IV Intermittent (12-11-18 @ 08:00)   300 mL/Hr IV Intermittent (12-10-18 @ 22:52)   300 mL/Hr IV Intermittent (12-10-18 @ 05:40)   300 mL/Hr IV Intermittent (12-09-18 @ 17:46)   300 mL/Hr IV Intermittent (12-09-18 @ 05:31)   300 mL/Hr IV Intermittent (12-08-18 @ 17:34)   300 mL/Hr IV Intermittent (12-08-18 @ 06:11)   300 mL/Hr IV Intermittent (12-07-18 @ 17:28)   300 mL/Hr IV Intermittent (12-07-18 @ 06:58)   300 mL/Hr IV Intermittent (12-06-18 @ 17:45)   300 mL/Hr IV Intermittent (12-06-18 @ 06:07)    piperacillin/tazobactam IVPB.   25 mL/Hr IV Intermittent (12-12-18 @ 14:33)   25 mL/Hr IV Intermittent (12-12-18 @ 05:10)   25 mL/Hr IV Intermittent (12-11-18 @ 21:28)   25 mL/Hr IV Intermittent (12-11-18 @ 14:16)   25 mL/Hr IV Intermittent (12-11-18 @ 05:31)   25 mL/Hr IV Intermittent (12-10-18 @ 22:52)   25 mL/Hr IV Intermittent (12-10-18 @ 14:19)   25 mL/Hr IV Intermittent (12-10-18 @ 05:40)   25 mL/Hr IV Intermittent (12-09-18 @ 21:21)   25 mL/Hr IV Intermittent (12-09-18 @ 13:55)   25 mL/Hr IV Intermittent (12-09-18 @ 05:31)   25 mL/Hr IV Intermittent (12-08-18 @ 22:29)   25 mL/Hr IV Intermittent (12-08-18 @ 14:25)   25 mL/Hr IV Intermittent (12-08-18 @ 06:16)   25 mL/Hr IV Intermittent (12-07-18 @ 22:01)   25 mL/Hr IV Intermittent (12-07-18 @ 14:46)   25 mL/Hr IV Intermittent (12-07-18 @ 06:58)   25 mL/Hr IV Intermittent (12-06-18 @ 22:42)   25 mL/Hr IV Intermittent (12-06-18 @ 14:31)   25 mL/Hr IV Intermittent (12-06-18 @ 06:07)   25 mL/Hr IV Intermittent (12-05-18 @ 21:49)   25 mL/Hr IV Intermittent (12-05-18 @ 14:55)   25 mL/Hr IV Intermittent (12-05-18 @ 05:22)   25 mL/Hr IV Intermittent (12-04-18 @ 21:18)   25 mL/Hr IV Intermittent (12-04-18 @ 13:48)   25 mL/Hr IV Intermittent (12-04-18 @ 05:07)   25 mL/Hr IV Intermittent (12-03-18 @ 21:18)    piperacillin/tazobactam IVPB.   200 mL/Hr IV Intermittent (12-03-18 @ 12:16)        linezolid  IVPB 600 milliGRAM(s) IV Intermittent every 12 hours  linezolid  IVPB      piperacillin/tazobactam IVPB. 3.375 Gram(s) IV Intermittent every 8 hours

## 2018-12-12 NOTE — CHART NOTE - NSCHARTNOTEFT_GEN_A_CORE
Preop Dx: High grade R SFA stenosis and R foot ulcer  Surgeon: Dr. Young  Procedure: RLE Angiogram    Vital Signs Last 24 Hrs  T(C): 36 (12 Dec 2018 07:51), Max: 36.8 (11 Dec 2018 16:00)  T(F): 96.8 (12 Dec 2018 07:51), Max: 98.3 (11 Dec 2018 16:00)  HR: 87 (12 Dec 2018 07:51) (87 - 97)  BP: 117/56 (12 Dec 2018 07:51) (100/54 - 129/57)  RR: 18 (12 Dec 2018 07:51) (18 - 18)                        12.7   6.13  )-----------( 154      ( 12 Dec 2018 05:07 )             37.0     12-12    136  |  101  |  9<L>  ----------------------------<  67<L>  5.3<H>   |  23  |  0.7    Ca    8.7      12 Dec 2018 05:07  Mg     1.7     12-12      PT/INR - ( 12 Dec 2018 05:07 )   PT: 15.70 sec;   INR: 1.37 ratio       PTT - ( 12 Dec 2018 05:07 )  PTT:30.7 sec  EKG: Done  CXR: Done  Type and Screen: Active    Plan:  - OR 12/13/2018 for RLE Angiogram with Dr. Young  - NPO after midnight except meds  - IVF while NPO  - Consented  - Hold any anti-coagulation from midnight if any.  - Continue antibiotics for cellulitis Preop Dx: High grade R SFA stenosis and R foot ulcer  Surgeon: Dr. Young  Procedure: RLE Angiogram    Vital Signs Last 24 Hrs  T(C): 36 (12 Dec 2018 07:51), Max: 36.8 (11 Dec 2018 16:00)  T(F): 96.8 (12 Dec 2018 07:51), Max: 98.3 (11 Dec 2018 16:00)  HR: 87 (12 Dec 2018 07:51) (87 - 97)  BP: 117/56 (12 Dec 2018 07:51) (100/54 - 129/57)  RR: 18 (12 Dec 2018 07:51) (18 - 18)                        12.7   6.13  )-----------( 154      ( 12 Dec 2018 05:07 )             37.0     12-12    136  |  101  |  9<L>  ----------------------------<  67<L>  5.3<H>   |  23  |  0.7    Ca    8.7      12 Dec 2018 05:07  Mg     1.7     12-12      PT/INR - ( 12 Dec 2018 05:07 )   PT: 15.70 sec;   INR: 1.37 ratio       PTT - ( 12 Dec 2018 05:07 )  PTT:30.7 sec  EKG: Done  CXR: Done  Type and Screen: Active    Plan:  - OR 12/13/2018 for RLE Angiogram with Dr. Young  - NPO after midnight except meds  - IVF while NPO  - Consented  - Hold any anti-coagulation from midnight if any. HSQ is okay to continue.  - Continue antibiotics for cellulitis

## 2018-12-12 NOTE — PROGRESS NOTE ADULT - ASSESSMENT
ASSESSMENT & PLAN  62yo male with past medical history of HTN, PAD s/p AKA, and GERD presented with fever and worsening lower extremity cellulitis, transferred from [facility] for right leg angiogram    #) Cellulitis with nonhealing ulcer  - No signs of systemic infection  - Likely 2/2 peripheral arterial disease, arterial duplex shows high grade stenosis on distal superficial femoral artery  - Vascular following: OR angiogram on Thursday instead  - ID following: Continue zyvox and zosyn for now  - Continue wound care daily  - Correct electrolyte prior surgery (K 5.3, Mg 1.7), will repeat BMP @ 4PM  - Pain control PRN    #) Congestive heart failure with unknown EF  - No signs of decompensation  - CXR shows cardiomegaly  - Continue Toprol, lasix, and aldactone    #) Hypertension  - BP well controlled  - Continue toprol    #) Transaminitis  - US abdominal shows cholelithiasis  - Continue to trend LFTs  - Avoid any hepatotoxic drugs    #) GERD  - Continue PPI      DVT ppx: heparin subQ

## 2018-12-12 NOTE — PROGRESS NOTE ADULT - ASSESSMENT
63M    Hypertension  S/P BKA left    Admitted with Severe Sepsis   RLE cellulitis/lymphangitis  12/3 bcx NGTD  CT LE No CT evidence of necrotizing fasciitis. Diffuse cellulitis from mid third of leg to foot with soft tissue ulcerations along medial lateral distal leg. Medial tibial periostitis worst at the distal third consistent with osteitis  Arterial Duplex High-grade stenosis in the distal superficial femoral artery    - ESR/CRP  - HIV, HCV Ab screen  - Zosyn/linezolid (on abx since 12/3), consider d/c after angio  - Vascular following- plan for angio    Spectra [id number]

## 2018-12-12 NOTE — PROGRESS NOTE ADULT - SUBJECTIVE AND OBJECTIVE BOX
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      PHYSICAL EXAM:  NAD  awake and alert  moist mm  no jvd  cta bl  rrr  soft  left aka  right le less erythematous  + PP rle  no cvat    Hospital Medications:   MEDICATIONS  (STANDING):  chlorhexidine 4% Liquid 1 Application(s) Topical <User Schedule>  furosemide    Tablet 20 milliGRAM(s) Oral daily  heparin  Injectable 5000 Unit(s) SubCutaneous every 8 hours  linezolid  IVPB 600 milliGRAM(s) IV Intermittent every 12 hours  linezolid  IVPB      metoprolol succinate ER 25 milliGRAM(s) Oral daily  pantoprazole    Tablet 40 milliGRAM(s) Oral before breakfast  piperacillin/tazobactam IVPB. 3.375 Gram(s) IV Intermittent every 8 hours  silver sulfADIAZINE 1% Cream 1 Application(s) Topical daily  sodium chloride 0.9%. 1000 milliLiter(s) (75 mL/Hr) IV Continuous <Continuous>  spironolactone 25 milliGRAM(s) Oral daily        VITALS:  T(F): 96.8 (12-12-18 @ 07:51), Max: 98.3 (12-11-18 @ 16:00)  HR: 87 (12-12-18 @ 07:51)  BP: 117/56 (12-12-18 @ 07:51)  RR: 18 (12-12-18 @ 07:51)  SpO2: --  Wt(kg): --    12-10 @ 07:01  -  12-11 @ 07:00  --------------------------------------------------------  IN: 0 mL / OUT: 1 mL / NET: -1 mL    12-11 @ 07:01  -  12-12 @ 07:00  --------------------------------------------------------  IN: 740 mL / OUT: 2400 mL / NET: -1660 mL    12-12 @ 07:01  -  12-12 @ 15:14  --------------------------------------------------------  IN: 1080 mL / OUT: 2100 mL / NET: -1020 mL          LABS:  12-12    136  |  101  |  9<L>  ----------------------------<  67<L>  5.3<H>   |  23  |  0.7    Ca    8.7      12 Dec 2018 05:07  Mg     1.7     12-12                            12.7   6.13  )-----------( 154      ( 12 Dec 2018 05:07 )             37.0       Urine Studies:        RADIOLOGY & ADDITIONAL STUDIES:

## 2018-12-12 NOTE — PROGRESS NOTE ADULT - ASSESSMENT
JUDY PEÑALOZA 63y Male  MRN#: [id number]   CODE STATUS: Full code    SUBJECTIVE  Patient is a 63y old Male who presented with a chief complaint of fever and RLE erythema for 1 week  Currently admitted to medicine with the primary diagnosis of right lower extremity cellulitis 2/2 PAD  Today is hospital day 9d, and this morning he is resting comfortably in bed and reports no overnight events. Patient still reports tenderness at right lower leg. Patient denies fever/chills, chest pain, shortness of breath, and abdominal pain.      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  Edema  GERD (gastroesophageal reflux disease)  Hypertension  S/P BKA (below knee amputation), left    ALLERGIES:  No Known Allergies    MEDICATIONS:  STANDING MEDICATIONS  chlorhexidine 4% Liquid 1 Application(s) Topical <User Schedule>  furosemide    Tablet 20 milliGRAM(s) Oral daily  heparin  Injectable 5000 Unit(s) SubCutaneous every 8 hours  linezolid  IVPB 600 milliGRAM(s) IV Intermittent every 12 hours  linezolid  IVPB      magnesium sulfate  IVPB 2 Gram(s) IV Intermittent once  metoprolol succinate ER 25 milliGRAM(s) Oral daily  pantoprazole    Tablet 40 milliGRAM(s) Oral before breakfast  piperacillin/tazobactam IVPB. 3.375 Gram(s) IV Intermittent every 8 hours  silver sulfADIAZINE 1% Cream 1 Application(s) Topical daily  sodium chloride 0.9%. 1000 milliLiter(s) IV Continuous <Continuous>  spironolactone 25 milliGRAM(s) Oral daily    PRN MEDICATIONS  acetaminophen   Tablet .. 650 milliGRAM(s) Oral every 6 hours PRN  ondansetron Injectable 4 milliGRAM(s) IV Push every 8 hours PRN      VITAL SIGNS: Last 24 Hours  T(C): 36 (12 Dec 2018 07:51), Max: 36.8 (11 Dec 2018 16:00)  T(F): 96.8 (12 Dec 2018 07:51), Max: 98.3 (11 Dec 2018 16:00)  HR: 87 (12 Dec 2018 07:51) (87 - 97)  BP: 117/56 (12 Dec 2018 07:51) (100/54 - 129/57)  BP(mean): --  RR: 18 (12 Dec 2018 07:51) (18 - 18)  SpO2: --    LABS:                        12.7   6.13  )-----------( 154      ( 12 Dec 2018 05:07 )             37.0     12-12    136  |  101  |  9<L>  ----------------------------<  67<L>  5.3<H>   |  23  |  0.7    Ca    8.7      12 Dec 2018 05:07  Mg     1.7     12-12      PT/INR - ( 12 Dec 2018 05:07 )   PT: 15.70 sec;   INR: 1.37 ratio         PTT - ( 12 Dec 2018 05:07 )  PTT:30.7 sec      Sedimentation Rate, Erythrocyte: >140 mm/Hr (12-12-18 @ 05:07)    RADIOLOGY:  < from: Xray Chest 1 View- PORTABLE-Routine (12.12.18 @ 06:40) >  Cardiomegaly, with otherwise no radiographic evidence of acute   cardiopulmonary process    < end of copied text >      PHYSICAL EXAM:  GENERAL: NAD, well-developed, AAOx3  HEENT:  Atraumatic, Normocephalic. Conjunctiva and sclera clear, No JVD  PULMONARY: Clear to auscultation bilaterally;  CARDIOVASCULAR: Regular rate and rhythm; No murmurs  GASTROINTESTINAL: Obese abdomen, soft, nontender, nondistended; Bowel sounds present  MUSCULOSKELETAL:  2+ pulse on RLE, Erythema, swelling, and tenderness on right calf, with multiple medial ulcers, with mild exudates soaking through the dressing.  NEUROLOGY: non-focal  SKIN: No rashes or lesions      ADMISSION SUMMARY  Patient is a 63y old Male who presented with a chief complaint of fever and RLE erythema for 1 week  Currently admitted to medicine with the primary diagnosis of right lower extremity cellulitis and ulcer 2/2 PAD.       ASSESSMENT & PLAN  64yo male with past medical history of HTN, PAD s/p AKA, and GERD presented with fever and worsening lower extremity cellulitis, transferred from [facility] for right leg angiogram    #) Cellulitis with nonhealing ulcer  - No signs of systemic infection  - Likely 2/2 peripheral arterial disease, arterial duplex shows high grade stenosis on distal superficial femoral artery  - Vascular following: OR angiogram on Thursday instead  - ID following: Continue zyvox and zosyn for now  - Continue wound care daily  - Correct electrolyte prior surgery (K 5.3, Mg 1.7), will repeat BMP @ 4PM  - Pain control PRN    #) Congestive heart failure with unknown EF  - No signs of decompensation  - CXR shows cardiomegaly  - Continue Toprol, lasix, and aldactone    #) Hypertension  - BP well controlled  - Continue toprol    #) Transaminitis  - US abdominal shows cholelithiasis  - Continue to trend LFTs  - Avoid any hepatotoxic drugs    #) GERD  - Continue PPI      DVT ppx: heparin subQ  GI ppx: Protonix  Diet: DASH  Activity: Ambulate as tolerated  Lines: Peripheral IVs  Code status: Full code  Dispo: Acute, OR angiogram on Thursday with vascular

## 2018-12-12 NOTE — PROGRESS NOTE ADULT - ASSESSMENT
hyponatremia - resolved  hypomagnesemia  hyperkalemia  rle cellulitis - better  left aka  sepsis  PVD - high grade rle stenosis  HTN  abnl lft's / cholelithiasis on abd sono      plan:    cont low dose lasix and aldactone  iv abx per ID - linezolid and zosyn  LE angiogram tomorrow  IVF when NPO after MN  f/u labs  monitor bp's  cont lopressor  wound care  replace Mg++

## 2018-12-12 NOTE — PROGRESS NOTE ADULT - SUBJECTIVE AND OBJECTIVE BOX
Patient was seen and examined. Spoke with RN. Chart reviewed.    No events overnight.  Vital Signs Last 24 Hrs  T(F): 96.8 (12 Dec 2018 07:51), Max: 98.3 (11 Dec 2018 16:00)  HR: 87 (12 Dec 2018 07:51) (87 - 97)  BP: 117/56 (12 Dec 2018 07:51) (100/54 - 129/57)  SpO2: --  MEDICATIONS  (STANDING):  chlorhexidine 4% Liquid 1 Application(s) Topical <User Schedule>  furosemide    Tablet 20 milliGRAM(s) Oral daily  heparin  Injectable 5000 Unit(s) SubCutaneous every 8 hours  linezolid  IVPB 600 milliGRAM(s) IV Intermittent every 12 hours  linezolid  IVPB      magnesium sulfate  IVPB 2 Gram(s) IV Intermittent once  metoprolol succinate ER 25 milliGRAM(s) Oral daily  pantoprazole    Tablet 40 milliGRAM(s) Oral before breakfast  piperacillin/tazobactam IVPB. 3.375 Gram(s) IV Intermittent every 8 hours  silver sulfADIAZINE 1% Cream 1 Application(s) Topical daily  sodium chloride 0.9%. 1000 milliLiter(s) (75 mL/Hr) IV Continuous <Continuous>  spironolactone 25 milliGRAM(s) Oral daily    MEDICATIONS  (PRN):  acetaminophen   Tablet .. 650 milliGRAM(s) Oral every 6 hours PRN Temp greater or equal to 38C (100.4F), Mild Pain (1 - 3)  ondansetron Injectable 4 milliGRAM(s) IV Push every 8 hours PRN Nausea and/or Vomiting    Labs:                        12.7   6.13  )-----------( 154      ( 12 Dec 2018 05:07 )             37.0     12 Dec 2018 05:07    136    |  101    |  9      ----------------------------<  67     5.3     |  23     |  0.7      Ca    8.7        12 Dec 2018 05:07  Mg     1.7       12 Dec 2018 05:07      PT/INR - ( 12 Dec 2018 05:07 )   PT: 15.70 sec;   INR: 1.37 ratio         PTT - ( 12 Dec 2018 05:07 )  PTT:30.7 sec        Radiology:    General: comfortable, NAD  Neurology: A&Ox3, nonfocal  Head:  Normocephalic, atraumatic  ENT:  Mucosa moist, no ulcerations  Neck:  Supple, no JVD,   Skin: no breakdown  Resp: CTA B/L  CV: RRR, S1S2,   GI: Soft, NT, bowel sounds  dressing+ + peripheral pulses, FROM all 4 extremity

## 2018-12-13 ENCOUNTER — APPOINTMENT (OUTPATIENT)
Dept: VASCULAR SURGERY | Facility: HOSPITAL | Age: 63
End: 2018-12-13
Payer: MEDICARE

## 2018-12-13 LAB
ALBUMIN SERPL ELPH-MCNC: 2.4 G/DL — LOW (ref 3.5–5.2)
ALP SERPL-CCNC: 170 U/L — HIGH (ref 30–115)
ALT FLD-CCNC: 64 U/L — HIGH (ref 0–41)
ANION GAP SERPL CALC-SCNC: 12 MMOL/L — SIGNIFICANT CHANGE UP (ref 7–14)
APTT BLD: 29.2 SEC — SIGNIFICANT CHANGE UP (ref 27–39.2)
AST SERPL-CCNC: 72 U/L — HIGH (ref 0–41)
BASOPHILS # BLD AUTO: 0.01 K/UL — SIGNIFICANT CHANGE UP (ref 0–0.2)
BASOPHILS NFR BLD AUTO: 0.2 % — SIGNIFICANT CHANGE UP (ref 0–1)
BILIRUB DIRECT SERPL-MCNC: 0.7 MG/DL — HIGH (ref 0–0.2)
BILIRUB INDIRECT FLD-MCNC: 1.2 MG/DL — SIGNIFICANT CHANGE UP (ref 0.2–1.2)
BILIRUB SERPL-MCNC: 1.9 MG/DL — HIGH (ref 0.2–1.2)
BLD GP AB SCN SERPL QL: SIGNIFICANT CHANGE UP
BUN SERPL-MCNC: 10 MG/DL — SIGNIFICANT CHANGE UP (ref 10–20)
CALCIUM SERPL-MCNC: 8.4 MG/DL — LOW (ref 8.5–10.1)
CHLORIDE SERPL-SCNC: 100 MMOL/L — SIGNIFICANT CHANGE UP (ref 98–110)
CO2 SERPL-SCNC: 23 MMOL/L — SIGNIFICANT CHANGE UP (ref 17–32)
CREAT SERPL-MCNC: 0.7 MG/DL — SIGNIFICANT CHANGE UP (ref 0.7–1.5)
EOSINOPHIL # BLD AUTO: 0.02 K/UL — SIGNIFICANT CHANGE UP (ref 0–0.7)
EOSINOPHIL NFR BLD AUTO: 0.3 % — SIGNIFICANT CHANGE UP (ref 0–8)
GLUCOSE SERPL-MCNC: 76 MG/DL — SIGNIFICANT CHANGE UP (ref 70–99)
HCT VFR BLD CALC: 35.6 % — LOW (ref 42–52)
HCV AB S/CO SERPL IA: 0.87 S/CO — SIGNIFICANT CHANGE UP
HCV AB SERPL-IMP: SIGNIFICANT CHANGE UP
HGB BLD-MCNC: 12.4 G/DL — LOW (ref 14–18)
IMM GRANULOCYTES NFR BLD AUTO: 0.5 % — HIGH (ref 0.1–0.3)
INR BLD: 1.3 RATIO — SIGNIFICANT CHANGE UP (ref 0.65–1.3)
LYMPHOCYTES # BLD AUTO: 1.02 K/UL — LOW (ref 1.2–3.4)
LYMPHOCYTES # BLD AUTO: 16.7 % — LOW (ref 20.5–51.1)
MAGNESIUM SERPL-MCNC: 2.1 MG/DL — SIGNIFICANT CHANGE UP (ref 1.8–2.4)
MCHC RBC-ENTMCNC: 34.2 PG — HIGH (ref 27–31)
MCHC RBC-ENTMCNC: 34.8 G/DL — SIGNIFICANT CHANGE UP (ref 32–37)
MCV RBC AUTO: 98.1 FL — HIGH (ref 80–94)
MONOCYTES # BLD AUTO: 0.35 K/UL — SIGNIFICANT CHANGE UP (ref 0.1–0.6)
MONOCYTES NFR BLD AUTO: 5.7 % — SIGNIFICANT CHANGE UP (ref 1.7–9.3)
NEUTROPHILS # BLD AUTO: 4.66 K/UL — SIGNIFICANT CHANGE UP (ref 1.4–6.5)
NEUTROPHILS NFR BLD AUTO: 76.6 % — HIGH (ref 42.2–75.2)
NRBC # BLD: 0 /100 WBCS — SIGNIFICANT CHANGE UP (ref 0–0)
PLATELET # BLD AUTO: 135 K/UL — SIGNIFICANT CHANGE UP (ref 130–400)
POTASSIUM SERPL-MCNC: 4.6 MMOL/L — SIGNIFICANT CHANGE UP (ref 3.5–5)
POTASSIUM SERPL-SCNC: 4.6 MMOL/L — SIGNIFICANT CHANGE UP (ref 3.5–5)
PROT SERPL-MCNC: 8 G/DL — SIGNIFICANT CHANGE UP (ref 6–8)
PROTHROM AB SERPL-ACNC: 14.9 SEC — HIGH (ref 9.95–12.87)
RBC # BLD: 3.63 M/UL — LOW (ref 4.7–6.1)
RBC # FLD: 12.9 % — SIGNIFICANT CHANGE UP (ref 11.5–14.5)
SODIUM SERPL-SCNC: 135 MMOL/L — SIGNIFICANT CHANGE UP (ref 135–146)
TYPE + AB SCN PNL BLD: SIGNIFICANT CHANGE UP
WBC # BLD: 6.09 K/UL — SIGNIFICANT CHANGE UP (ref 4.8–10.8)
WBC # FLD AUTO: 6.09 K/UL — SIGNIFICANT CHANGE UP (ref 4.8–10.8)

## 2018-12-13 PROCEDURE — 76937 US GUIDE VASCULAR ACCESS: CPT | Mod: 26

## 2018-12-13 PROCEDURE — 75710 ARTERY X-RAYS ARM/LEG: CPT | Mod: 26

## 2018-12-13 PROCEDURE — 36247 INS CATH ABD/L-EXT ART 3RD: CPT | Mod: RT

## 2018-12-13 PROCEDURE — 75630 X-RAY AORTA LEG ARTERIES: CPT | Mod: 26,59

## 2018-12-13 RX ORDER — METOPROLOL TARTRATE 50 MG
25 TABLET ORAL DAILY
Qty: 0 | Refills: 0 | Status: DISCONTINUED | OUTPATIENT
Start: 2018-12-13 | End: 2018-12-18

## 2018-12-13 RX ORDER — ONDANSETRON 8 MG/1
4 TABLET, FILM COATED ORAL EVERY 8 HOURS
Qty: 0 | Refills: 0 | Status: DISCONTINUED | OUTPATIENT
Start: 2018-12-13 | End: 2018-12-18

## 2018-12-13 RX ORDER — ACETAMINOPHEN 500 MG
650 TABLET ORAL EVERY 6 HOURS
Qty: 0 | Refills: 0 | Status: DISCONTINUED | OUTPATIENT
Start: 2018-12-13 | End: 2018-12-18

## 2018-12-13 RX ORDER — SALICYLIC ACID 0.5 %
1 CLEANSER (GRAM) TOPICAL EVERY 6 HOURS
Qty: 0 | Refills: 0 | Status: DISCONTINUED | OUTPATIENT
Start: 2018-12-13 | End: 2018-12-18

## 2018-12-13 RX ORDER — ONDANSETRON 8 MG/1
4 TABLET, FILM COATED ORAL ONCE
Qty: 0 | Refills: 0 | Status: DISCONTINUED | OUTPATIENT
Start: 2018-12-13 | End: 2018-12-13

## 2018-12-13 RX ORDER — HEPARIN SODIUM 5000 [USP'U]/ML
5000 INJECTION INTRAVENOUS; SUBCUTANEOUS EVERY 8 HOURS
Qty: 0 | Refills: 0 | Status: DISCONTINUED | OUTPATIENT
Start: 2018-12-13 | End: 2018-12-18

## 2018-12-13 RX ORDER — CHLORHEXIDINE GLUCONATE 213 G/1000ML
1 SOLUTION TOPICAL
Qty: 0 | Refills: 0 | Status: DISCONTINUED | OUTPATIENT
Start: 2018-12-13 | End: 2018-12-18

## 2018-12-13 RX ORDER — SPIRONOLACTONE 25 MG/1
25 TABLET, FILM COATED ORAL DAILY
Qty: 0 | Refills: 0 | Status: DISCONTINUED | OUTPATIENT
Start: 2018-12-13 | End: 2018-12-18

## 2018-12-13 RX ORDER — PANTOPRAZOLE SODIUM 20 MG/1
40 TABLET, DELAYED RELEASE ORAL
Qty: 0 | Refills: 0 | Status: DISCONTINUED | OUTPATIENT
Start: 2018-12-13 | End: 2018-12-18

## 2018-12-13 RX ORDER — HYDROMORPHONE HYDROCHLORIDE 2 MG/ML
1 INJECTION INTRAMUSCULAR; INTRAVENOUS; SUBCUTANEOUS
Qty: 0 | Refills: 0 | Status: DISCONTINUED | OUTPATIENT
Start: 2018-12-13 | End: 2018-12-13

## 2018-12-13 RX ORDER — LINEZOLID 600 MG/300ML
600 INJECTION, SOLUTION INTRAVENOUS ONCE
Qty: 0 | Refills: 0 | Status: COMPLETED | OUTPATIENT
Start: 2018-12-13 | End: 2018-12-13

## 2018-12-13 RX ORDER — HYDROMORPHONE HYDROCHLORIDE 2 MG/ML
0.5 INJECTION INTRAMUSCULAR; INTRAVENOUS; SUBCUTANEOUS
Qty: 0 | Refills: 0 | Status: DISCONTINUED | OUTPATIENT
Start: 2018-12-13 | End: 2018-12-13

## 2018-12-13 RX ORDER — OXYCODONE HYDROCHLORIDE 5 MG/1
5 TABLET ORAL ONCE
Qty: 0 | Refills: 0 | Status: DISCONTINUED | OUTPATIENT
Start: 2018-12-13 | End: 2018-12-13

## 2018-12-13 RX ORDER — PIPERACILLIN AND TAZOBACTAM 4; .5 G/20ML; G/20ML
3.38 INJECTION, POWDER, LYOPHILIZED, FOR SOLUTION INTRAVENOUS EVERY 8 HOURS
Qty: 0 | Refills: 0 | Status: DISCONTINUED | OUTPATIENT
Start: 2018-12-13 | End: 2018-12-14

## 2018-12-13 RX ORDER — FUROSEMIDE 40 MG
20 TABLET ORAL DAILY
Qty: 0 | Refills: 0 | Status: DISCONTINUED | OUTPATIENT
Start: 2018-12-13 | End: 2018-12-18

## 2018-12-13 RX ORDER — LINEZOLID 600 MG/300ML
600 INJECTION, SOLUTION INTRAVENOUS EVERY 12 HOURS
Qty: 0 | Refills: 0 | Status: DISCONTINUED | OUTPATIENT
Start: 2018-12-13 | End: 2018-12-14

## 2018-12-13 RX ORDER — LINEZOLID 600 MG/300ML
INJECTION, SOLUTION INTRAVENOUS
Qty: 0 | Refills: 0 | Status: DISCONTINUED | OUTPATIENT
Start: 2018-12-13 | End: 2018-12-14

## 2018-12-13 RX ORDER — OXYCODONE HYDROCHLORIDE 5 MG/1
5 TABLET ORAL EVERY 8 HOURS
Qty: 0 | Refills: 0 | Status: DISCONTINUED | OUTPATIENT
Start: 2018-12-13 | End: 2018-12-18

## 2018-12-13 RX ADMIN — HEPARIN SODIUM 5000 UNIT(S): 5000 INJECTION INTRAVENOUS; SUBCUTANEOUS at 21:38

## 2018-12-13 RX ADMIN — SPIRONOLACTONE 25 MILLIGRAM(S): 25 TABLET, FILM COATED ORAL at 06:22

## 2018-12-13 RX ADMIN — PIPERACILLIN AND TAZOBACTAM 25 GRAM(S): 4; .5 INJECTION, POWDER, LYOPHILIZED, FOR SOLUTION INTRAVENOUS at 06:21

## 2018-12-13 RX ADMIN — LINEZOLID 300 MILLIGRAM(S): 600 INJECTION, SOLUTION INTRAVENOUS at 11:43

## 2018-12-13 RX ADMIN — LINEZOLID 300 MILLIGRAM(S): 600 INJECTION, SOLUTION INTRAVENOUS at 06:21

## 2018-12-13 RX ADMIN — HEPARIN SODIUM 5000 UNIT(S): 5000 INJECTION INTRAVENOUS; SUBCUTANEOUS at 06:21

## 2018-12-13 RX ADMIN — PANTOPRAZOLE SODIUM 40 MILLIGRAM(S): 20 TABLET, DELAYED RELEASE ORAL at 06:22

## 2018-12-13 RX ADMIN — Medication 25 MILLIGRAM(S): at 06:23

## 2018-12-13 RX ADMIN — Medication 1 APPLICATION(S): at 18:28

## 2018-12-13 RX ADMIN — Medication 1 APPLICATION(S): at 06:23

## 2018-12-13 RX ADMIN — HEPARIN SODIUM 5000 UNIT(S): 5000 INJECTION INTRAVENOUS; SUBCUTANEOUS at 14:58

## 2018-12-13 RX ADMIN — Medication 1 APPLICATION(S): at 12:45

## 2018-12-13 RX ADMIN — Medication 50 GRAM(S): at 01:34

## 2018-12-13 RX ADMIN — PIPERACILLIN AND TAZOBACTAM 25 GRAM(S): 4; .5 INJECTION, POWDER, LYOPHILIZED, FOR SOLUTION INTRAVENOUS at 21:38

## 2018-12-13 RX ADMIN — Medication 1 APPLICATION(S): at 12:41

## 2018-12-13 RX ADMIN — PIPERACILLIN AND TAZOBACTAM 25 GRAM(S): 4; .5 INJECTION, POWDER, LYOPHILIZED, FOR SOLUTION INTRAVENOUS at 14:58

## 2018-12-13 RX ADMIN — SODIUM CHLORIDE 75 MILLILITER(S): 9 INJECTION INTRAMUSCULAR; INTRAVENOUS; SUBCUTANEOUS at 00:15

## 2018-12-13 RX ADMIN — Medication 20 MILLIGRAM(S): at 06:22

## 2018-12-13 RX ADMIN — Medication 1 APPLICATION(S): at 23:36

## 2018-12-13 RX ADMIN — CHLORHEXIDINE GLUCONATE 1 APPLICATION(S): 213 SOLUTION TOPICAL at 06:22

## 2018-12-13 NOTE — BRIEF OPERATIVE NOTE - OPERATION/FINDINGS
Patent vessels without significant atherosclerotic disease or stenoses. Three-vessel tibial runoff to foot, patent.

## 2018-12-13 NOTE — CHART NOTE - NSCHARTNOTEFT_GEN_A_CORE
GENERAL SURGERY PROGRESS NOTE     JUDY PEÑALOZA  63y  Male  Hospital day :10d  POD:  Procedure: Angiogram, peripheral    Pt doing well post procedure, laying in bed comfortably. Denies any pain. Able to move RLE. L groin with sterile dressing in place c/d/i, no swelling or hematoma presents. Able to doppler DP/PT.     T(F): 97.7 (12-13-18 @ 13:16), Max: 98.5 (12-13-18 @ 04:07)  HR: 96 (12-13-18 @ 13:16) (82 - 96)  BP: 125/58 (12-13-18 @ 13:16) (99/63 - 144/70)  ABP: --  ABP(mean): --  RR: 18 (12-13-18 @ 13:16) (12 - 21)  SpO2: 97% (12-13-18 @ 12:07) (96% - 100%)    DIET/FLUIDS:   NG:                                                                                DRAINS:     BM:     EMESIS:     URINE:      GI proph:  pantoprazole    Tablet 40 milliGRAM(s) Oral before breakfast    AC/ proph: heparin  Injectable 5000 Unit(s) SubCutaneous every 8 hours    ABx: linezolid  IVPB 600 milliGRAM(s) IV Intermittent every 12 hours  linezolid  IVPB      piperacillin/tazobactam IVPB. 3.375 Gram(s) IV Intermittent every 8 hours      PHYSICAL EXAM:  GENERAL: NAD, well-appearing  CHEST/LUNG: no obvious increased wob  EXTREMITIES:  L groin with sterile dressing in place c/d/i, no swelling or hematoma presents. Able to doppler DP/PT.                               12.4   6.09  )-----------( 135      ( 13 Dec 2018 06:11 )             35.6       Auto Immature Granulocyte %: 0.5 % (12-13-18 @ 06:11)  Auto Neutrophil %: 76.6 % (12-13-18 @ 06:11)    12-13    135  |  100  |  10  ----------------------------<  76  4.6   |  23  |  0.7      Magnesium, Serum: 2.1 mg/dL (12-13-18 @ 06:11)      LFTs:             8.0  | 1.9  | 72       ------------------[170     ( 13 Dec 2018 05:41 )  2.4  | 0.7  | 64          Lipase:x      Amylase:x             Coags:     14.90  ----< 1.30    ( 13 Dec 2018 06:11 )     29.2            s/p RLE angio 3 vessel runoff tibial runoff, no significant disease    PLAN:   - Ambulate as tolerated  - no further surgical intervention at this time  - please have pt f/u in 2 weeks upon d/c with Dr. Young

## 2018-12-13 NOTE — PROGRESS NOTE ADULT - ASSESSMENT
JUDY PEÑALOZA 63y Male  MRN#: [id number]   CODE STATUS: Full code      SUBJECTIVE  Patient is a 63y old Male who presented with a chief complaint of fever and RLE erythema for 1 week  Currently admitted to medicine with the primary diagnosis of right lower extremity cellulitis 2/2 PAD  Today is hospital day 9d, and this morning he is resting comfortably in bed and reports no overnight events. Patient still reports tenderness at right lower leg, especially when his leg is hanging down the bed. Pain improved when he elevates it.  Patient denies fever/chills, chest pain, shortness of breath, and abdominal pain, or any urinary symptoms.      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 3.375 Gram(s) IV Intermittent every 8 hours  silver sulfADIAZINE 1% Cream 1 Application(s) Topical daily  sodium chloride 0.9%. 1000 milliLiter(s) IV Continuous <Continuous>  spironolactone 25 milliGRAM(s) Oral daily  vitamin A &amp; D Ointment 1 Application(s) Topical every 6 hours    PRN MEDICATIONS  acetaminophen   Tablet .. 650 milliGRAM(s) Oral every 6 hours PRN  ondansetron Injectable 4 milliGRAM(s) IV Push every 8 hours PRN  oxyCODONE    IR 5 milliGRAM(s) Oral every 8 hours PRN      VITAL SIGNS: Last 24 Hours  T(C): 36.6 (12 Dec 2018 23:00), Max: 37.1 (12 Dec 2018 15:07)  T(F): 97.8 (12 Dec 2018 23:00), Max: 98.7 (12 Dec 2018 15:07)  HR: 91 (12 Dec 2018 23:00) (87 - 100)  BP: 130/68 (12 Dec 2018 23:00) (108/59 - 130/68)  BP(mean): --  RR: 18 (12 Dec 2018 23:00) (18 - 18)  SpO2: --    LABS:                        12.7   6.13  )-----------( 154      ( 12 Dec 2018 05:07 )             37.0     12-12    131<L>  |  96<L>  |  11  ----------------------------<  77  4.6   |  24  |  0.8    Ca    8.5      12 Dec 2018 22:13  Phos  2.9     12-12  Mg     1.6     12-12      PT/INR - ( 12 Dec 2018 05:07 )   PT: 15.70 sec;   INR: 1.37 ratio         PTT - ( 12 Dec 2018 05:07 )  PTT:30.7 sec    RADIOLOGY:      PHYSICAL EXAM:  62yo male with past medical history of HTN, PAD s/p AKA, and GERD presented with fever and worsening lower extremity cellulitis, transferred from [facility] for right leg angiogram    #) Cellulitis with nonhealing ulcer  - No signs of systemic infection  - Likely 2/2 peripheral arterial disease, arterial duplex shows high grade stenosis on distal superficial femoral artery  - Vascular following: OR angiogram today with Vascular, pending recs post angiogram  - ID following: Continue zyvox and zosyn for now, consider d/c after angio  - Continue wound care daily  - Pain control PRN   - PT eval  - Physiatry: SNF vs home with VNS ([facility])    #) Congestive heart failure with unknown EF  - No signs of decompensation, CXR shows cardiomegaly  - CXR shows cardiomegaly  - Continue Toprol, Lasix and aldactone    #) Hypertension  - BP well controlled  - Continue Toprol    #) Transaminitis  - US abdominal shows cholelithiasis  - Continue to trend LFTs  - Avoid any hepatotoxic drugs    #) GERD  - Continue PPI      DVT ppx: heparin subQ  GI ppx: Protonix  Diet: DASH  Activity: Ambulate as tolerated  Lines: Peripheral IVs  Code status: Full code  Dispo: Acute, OR angiogram on Thursday with vascular JUDY PEÑALOZA 63y Male  MRN#: [id number]   CODE STATUS: Full code      SUBJECTIVE  Patient is a 63y old Male who presented with a chief complaint of fever and RLE erythema for 1 week  Currently admitted to medicine with the primary diagnosis of right lower extremity cellulitis 2/2 PAD  Today is hospital day 9d, and this morning he is resting comfortably in bed and reports no overnight events. Patient still reports tenderness at right lower leg, especially when his leg is hanging down the bed. Pain improved when he elevates it.  Patient denies fever/chills, chest pain, shortness of breath, and abdominal pain, or any urinary symptoms.      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 3.375 Gram(s) IV Intermittent every 8 hours  silver sulfADIAZINE 1% Cream 1 Application(s) Topical daily  sodium chloride 0.9%. 1000 milliLiter(s) IV Continuous <Continuous>  spironolactone 25 milliGRAM(s) Oral daily  vitamin A &amp; D Ointment 1 Application(s) Topical every 6 hours    PRN MEDICATIONS  acetaminophen   Tablet .. 650 milliGRAM(s) Oral every 6 hours PRN  ondansetron Injectable 4 milliGRAM(s) IV Push every 8 hours PRN  oxyCODONE    IR 5 milliGRAM(s) Oral every 8 hours PRN      VITAL SIGNS: Last 24 Hours  T(C): 36.6 (12 Dec 2018 23:00), Max: 37.1 (12 Dec 2018 15:07)  T(F): 97.8 (12 Dec 2018 23:00), Max: 98.7 (12 Dec 2018 15:07)  HR: 91 (12 Dec 2018 23:00) (87 - 100)  BP: 130/68 (12 Dec 2018 23:00) (108/59 - 130/68)  BP(mean): --  RR: 18 (12 Dec 2018 23:00) (18 - 18)  SpO2: --    LABS:                        12.7   6.13  )-----------( 154      ( 12 Dec 2018 05:07 )             37.0     12-12    131<L>  |  96<L>  |  11  ----------------------------<  77  4.6   |  24  |  0.8    Ca    8.5      12 Dec 2018 22:13  Phos  2.9     12-12  Mg     1.6     12-12      PT/INR - ( 12 Dec 2018 05:07 )   PT: 15.70 sec;   INR: 1.37 ratio         PTT - ( 12 Dec 2018 05:07 )  PTT:30.7 sec    RADIOLOGY:      PHYSICAL EXAM:  GENERAL: NAD, well-developed, AAOx3  HEENT:  Atraumatic, Normocephalic. Conjunctiva and sclera clear, No JVD  PULMONARY: Clear to auscultation bilaterally;  CARDIOVASCULAR: Regular rate and rhythm; No murmurs  GASTROINTESTINAL: Obese abdomen, soft, nontender, nondistended; Bowel sounds present  MUSCULOSKELETAL:  2+ pulse on RLE, Erythema, swelling, and tenderness on right calf, with multiple medial ulcers, with mild exudates soaking through the dressing.  NEUROLOGY: non-focal  SKIN: No rashes or lesions      ADMISSION SUMMARY  Patient is a 63y old Male who presented with a chief complaint of fever and RLE erythema for 1 week  Currently admitted to medicine with the primary diagnosis of right lower extremity cellulitis and ulcer 2/2 PAD.       ASSESSMENT & PLAN  64yo male with past medical history of HTN, PAD s/p AKA, and GERD presented with fever and worsening lower extremity cellulitis, transferred from [facility] for right leg angiogram    #) Cellulitis with nonhealing ulcer  - No signs of systemic infection  - Likely 2/2 peripheral arterial disease, arterial duplex shows high grade stenosis on distal superficial femoral artery  - Vascular following: OR angiogram today with Vascular, pending recs post angiogram  - ID following: Continue zyvox and zosyn for now, consider d/c after angio  - Continue wound care daily  - Pain control PRN   - PT eval  - Physiatry: SNF vs home with VNS ([facility])    #) Congestive heart failure with unknown EF  - No signs of decompensation, CXR shows cardiomegaly  - CXR shows cardiomegaly  - Continue Toprol, Lasix and aldactone    #) Hypertension  - BP well controlled  - Continue Toprol    #) Transaminitis  - US abdominal shows cholelithiasis  - Continue to trend LFTs  - Avoid any hepatotoxic drugs    #) GERD  - Continue PPI      DVT ppx: heparin subQ  GI ppx: Protonix  Diet: DASH  Activity: Ambulate as tolerated  Lines: Peripheral IVs  Code status: Full code  Dispo: Acute, OR angiogram on Thursday with vascular JUDY PEÑALOZA 63y Male  MRN#: [id number]   CODE STATUS: Full code      SUBJECTIVE  Patient is a 63y old Male who presented with a chief complaint of fever and RLE erythema for 1 week  Currently admitted to medicine with the primary diagnosis of right lower extremity cellulitis 2/2 PAD  Today is hospital day 9d, and this morning he is resting comfortably in bed and reports no overnight events. Patient still reports tenderness at right lower leg, especially when his leg is hanging down the bed. Pain improved when he elevates it. Patient denies fever/chills, chest pain, shortness of breath, and abdominal pain, or any urinary symptoms.      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 3.375 Gram(s) IV Intermittent every 8 hours  silver sulfADIAZINE 1% Cream 1 Application(s) Topical daily  sodium chloride 0.9%. 1000 milliLiter(s) IV Continuous <Continuous>  spironolactone 25 milliGRAM(s) Oral daily  vitamin A &amp; D Ointment 1 Application(s) Topical every 6 hours    PRN MEDICATIONS  acetaminophen   Tablet .. 650 milliGRAM(s) Oral every 6 hours PRN  ondansetron Injectable 4 milliGRAM(s) IV Push every 8 hours PRN  oxyCODONE    IR 5 milliGRAM(s) Oral every 8 hours PRN      VITAL SIGNS: Last 24 Hours  T(C): 36.6 (12 Dec 2018 23:00), Max: 37.1 (12 Dec 2018 15:07)  T(F): 97.8 (12 Dec 2018 23:00), Max: 98.7 (12 Dec 2018 15:07)  HR: 91 (12 Dec 2018 23:00) (87 - 100)  BP: 130/68 (12 Dec 2018 23:00) (108/59 - 130/68)  BP(mean): --  RR: 18 (12 Dec 2018 23:00) (18 - 18)  SpO2: --    LABS:                        12.7   6.13  )-----------( 154      ( 12 Dec 2018 05:07 )             37.0     12-12    131<L>  |  96<L>  |  11  ----------------------------<  77  4.6   |  24  |  0.8    Ca    8.5      12 Dec 2018 22:13  Phos  2.9     12-12  Mg     1.6     12-12      PT/INR - ( 12 Dec 2018 05:07 )   PT: 15.70 sec;   INR: 1.37 ratio         PTT - ( 12 Dec 2018 05:07 )  PTT:30.7 sec    RADIOLOGY:      PHYSICAL EXAM:  GENERAL: NAD, well-developed, AAOx3  HEENT:  Atraumatic, Normocephalic. Conjunctiva and sclera clear, No JVD  PULMONARY: Clear to auscultation bilaterally;  CARDIOVASCULAR: Regular rate and rhythm; No murmurs  GASTROINTESTINAL: Obese abdomen, soft, nontender, nondistended; Bowel sounds present  MUSCULOSKELETAL:  2+ pulse on RLE, Erythema, and swelling right calf, with multiple medial ulcers, with clean dressing on top, no tenderness  NEUROLOGY: non-focal  SKIN: No rashes or lesions      ADMISSION SUMMARY  Patient is a 63y old Male who presented with a chief complaint of fever and RLE erythema for 1 week  Currently admitted to medicine with the primary diagnosis of right lower extremity cellulitis and ulcer 2/2 PAD.       ASSESSMENT & PLAN  64yo male with past medical history of HTN, PAD s/p AKA, and GERD presented with fever and worsening lower extremity cellulitis, transferred from [facility] for right leg angiogram    #) Cellulitis with nonhealing ulcer  - No signs of systemic infection  - Likely 2/2 peripheral arterial disease, arterial duplex shows high grade stenosis on distal superficial femoral artery  - Vascular following: s/p angiogram by Vascular, pending recs post angiogram  - ID following: Continue zyvox and zosyn for now, consider d/c after angio  - Continue wound care daily  - Pain control PRN   - PT eval  - Physiatry: SNF vs home with [facility]NS ([facility])    #) Congestive heart failure with unknown EF  - No signs of decompensation, CXR shows cardiomegaly  - CXR shows cardiomegaly  - Continue Toprol, Lasix and aldactone    #) Hypertension  - BP well controlled  - Continue Toprol    #) Transaminitis - improved  - US abdominal shows cholelithiasis  - Continue to trend LFTs  - Avoid any hepatotoxic drugs    #) GERD  - Continue PPI      DVT ppx: heparin subQ  GI ppx: Protonix  Diet: DASH  Activity: Ambulate as tolerated  Lines: Peripheral IVs  Code status: Full code  Dispo: Acute, pending vascular and ID recs

## 2018-12-13 NOTE — PROGRESS NOTE ADULT - ASSESSMENT
ASSESSMENT & PLAN  62yo male with past medical history of HTN, PAD s/p AKA, and GERD presented with fever and worsening lower extremity cellulitis, transferred from [facility] for right leg angiogram    #) Cellulitis with nonhealing ulcer  - No signs of systemic infection  - Likely 2/2 peripheral arterial disease, arterial duplex shows high grade stenosis on distal superficial femoral artery  - Vascular following: OR angiogram today with Vascular, pending recs post angiogram  - ID following: Continue zyvox and zosyn for now, consider d/c after angio  - Continue wound care daily  - Pain control PRN   - PT eval  - Physiatry: SNF vs home with VNS ([facility])    #) Congestive heart failure with unknown EF  - No signs of decompensation, CXR shows cardiomegaly  - CXR shows cardiomegaly  - Continue Toprol, Lasix and aldactone    #) Hypertension  - BP well controlled  - Continue Toprol    #) Transaminitis  - US abdominal shows cholelithiasis  - Continue to trend LFTs  - Avoid any hepatotoxic drugs    #) GERD  - Continue PPI

## 2018-12-13 NOTE — PROGRESS NOTE ADULT - SUBJECTIVE AND OBJECTIVE BOX
GENERAL SURGERY PROGRESS NOTE     JUDY PEÑALOZA  63y  Male  Hospital day :10d  POD:  Procedure:   OVERNIGHT EVENTS:  No acute overnight events. Pt is going to the OR today for RLE angiogram. K has become wnl 4.6.     T(F): 97.9 (12-13-18 @ 07:29), Max: 98.7 (12-12-18 @ 15:07)  HR: 95 (12-13-18 @ 09:00) (87 - 100)  BP: 123/60 (12-13-18 @ 09:00) (108/59 - 144/70)  ABP: --  ABP(mean): --  RR: 18 (12-13-18 @ 09:00) (18 - 18)  SpO2: 98% (12-13-18 @ 04:07) (98% - 98%)    DIET/FLUIDS: sodium chloride 0.9%. 1000 milliLiter(s) IV Continuous <Continuous>    NG:                                                                                DRAINS:     BM:     EMESIS:     URINE:      GI proph:  pantoprazole    Tablet 40 milliGRAM(s) Oral before breakfast    AC/ proph: heparin  Injectable 5000 Unit(s) SubCutaneous every 8 hours    ABx: linezolid  IVPB 600 milliGRAM(s) IV Intermittent every 12 hours  linezolid  IVPB      piperacillin/tazobactam IVPB. 3.375 Gram(s) IV Intermittent every 8 hours      PHYSICAL EXAM:  GENERAL: NAD, well-appearing  CHEST/LUNG: no obvious increased wob  EXTREMITIES: RLE with foot ulcer      LABS  Labs:  CAPILLARY BLOOD GLUCOSE                              12.4   6.09  )-----------( 135      ( 13 Dec 2018 06:11 )             35.6       Auto Immature Granulocyte %: 0.5 % (12-13-18 @ 06:11)  Auto Neutrophil %: 76.6 % (12-13-18 @ 06:11)    12-13    135  |  100  |  10  ----------------------------<  76  4.6   |  23  |  0.7      Magnesium, Serum: 2.1 mg/dL (12-13-18 @ 06:11)      LFTs:         Coags:     14.90  ----< 1.30    ( 13 Dec 2018 06:11 )     29.2

## 2018-12-13 NOTE — PROGRESS NOTE ADULT - SUBJECTIVE AND OBJECTIVE BOX
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      PHYSICAL EXAM:  NAD  awake and alert  moist mm  no jvd  cta bl  rrr  soft  left aka  right le less erythematous  + PP rle  no cvat    Hospital Medications:   MEDICATIONS  (STANDING):  chlorhexidine 4% Liquid 1 Application(s) Topical <User Schedule>  furosemide    Tablet 20 milliGRAM(s) Oral daily  heparin  Injectable 5000 Unit(s) SubCutaneous every 8 hours  linezolid  IVPB 600 milliGRAM(s) IV Intermittent every 12 hours  linezolid  IVPB      metoprolol succinate ER 25 milliGRAM(s) Oral daily  pantoprazole    Tablet 40 milliGRAM(s) Oral before breakfast  piperacillin/tazobactam IVPB. 3.375 Gram(s) IV Intermittent every 8 hours  silver sulfADIAZINE 1% Cream 1 Application(s) Topical daily  spironolactone 25 milliGRAM(s) Oral daily  vitamin A &amp; D Ointment 1 Application(s) Topical every 6 hours        VITALS:  T(F): 97.7 (12-13-18 @ 13:16), Max: 98.7 (12-12-18 @ 15:07)  HR: 96 (12-13-18 @ 13:16)  BP: 125/58 (12-13-18 @ 13:16)  RR: 18 (12-13-18 @ 13:16)  SpO2: 97% (12-13-18 @ 12:07)  Wt(kg): --    12-11 @ 07:01  -  12-12 @ 07:00  --------------------------------------------------------  IN: 740 mL / OUT: 2400 mL / NET: -1660 mL    12-12 @ 07:01  -  12-13 @ 07:00  --------------------------------------------------------  IN: 2130 mL / OUT: 2800 mL / NET: -670 mL    12-13 @ 07:01  -  12-13 @ 14:36  --------------------------------------------------------  IN: 0 mL / OUT: 640 mL / NET: -640 mL      Height (cm): 162.56 (12-13 @ 09:00)  Weight (kg): 98.5 (12-13 @ 09:00)  BMI (kg/m2): 37.3 (12-13 @ 09:00)  BSA (m2): 2.03 (12-13 @ 09:00)    LABS:  12-13    135  |  100  |  10  ----------------------------<  76  4.6   |  23  |  0.7    Ca    8.4<L>      13 Dec 2018 06:11  Phos  2.9     12-12  Mg     2.1     12-13    TPro  8.0  /  Alb  2.4<L>  /  TBili  1.9<H>  /  DBili  0.7<H>  /  AST  72<H>  /  ALT  64<H>  /  AlkPhos  170<H>  12-13                          12.4   6.09  )-----------( 135      ( 13 Dec 2018 06:11 )             35.6       Urine Studies:        RADIOLOGY & ADDITIONAL STUDIES:

## 2018-12-13 NOTE — PROGRESS NOTE ADULT - SUBJECTIVE AND OBJECTIVE BOX
Patient was seen and examined. Spoke with RN. Chart reviewed.    No events overnight.  Vital Signs Last 24 Hrs  T(F): 97.7 (13 Dec 2018 13:16), Max: 98.7 (12 Dec 2018 15:07)  HR: 96 (13 Dec 2018 13:16) (82 - 100)  BP: 125/58 (13 Dec 2018 13:16) (99/63 - 144/70)  SpO2: 97% (13 Dec 2018 12:07) (96% - 100%)  MEDICATIONS  (STANDING):  chlorhexidine 4% Liquid 1 Application(s) Topical <User Schedule>  furosemide    Tablet 20 milliGRAM(s) Oral daily  heparin  Injectable 5000 Unit(s) SubCutaneous every 8 hours  linezolid  IVPB 600 milliGRAM(s) IV Intermittent every 12 hours  linezolid  IVPB      metoprolol succinate ER 25 milliGRAM(s) Oral daily  pantoprazole    Tablet 40 milliGRAM(s) Oral before breakfast  piperacillin/tazobactam IVPB. 3.375 Gram(s) IV Intermittent every 8 hours  silver sulfADIAZINE 1% Cream 1 Application(s) Topical daily  spironolactone 25 milliGRAM(s) Oral daily  vitamin A &amp; D Ointment 1 Application(s) Topical every 6 hours    MEDICATIONS  (PRN):  acetaminophen   Tablet .. 650 milliGRAM(s) Oral every 6 hours PRN Temp greater or equal to 38C (100.4F), Mild Pain (1 - 3)  HYDROmorphone  Injectable 0.5 milliGRAM(s) IV Push every 30 minutes PRN Moderate Pain (4 - 6)  HYDROmorphone  Injectable 1 milliGRAM(s) IV Push every 30 minutes PRN Severe Pain (7 - 10)  ondansetron Injectable 4 milliGRAM(s) IV Push every 8 hours PRN Nausea and/or Vomiting  ondansetron Injectable 4 milliGRAM(s) IV Push once PRN Nausea and/or Vomiting  oxyCODONE    IR 5 milliGRAM(s) Oral every 8 hours PRN Severe Pain (7 - 10)  oxyCODONE    IR 5 milliGRAM(s) Oral once PRN Moderate Pain (4 - 6)    Labs:                        12.4   6.09  )-----------( 135      ( 13 Dec 2018 06:11 )             35.6                         12.7   6.13  )-----------( 154      ( 12 Dec 2018 05:07 )             37.0     13 Dec 2018 06:11    135    |  100    |  10     ----------------------------<  76     4.6     |  23     |  0.7    12 Dec 2018 22:13    131    |  96     |  11     ----------------------------<  77     4.6     |  24     |  0.8      Ca    8.4        13 Dec 2018 06:11  Ca    8.5        12 Dec 2018 22:13  Phos  2.9       12 Dec 2018 22:13  Mg     2.1       13 Dec 2018 06:11  Mg     1.6       12 Dec 2018 22:13      PT/INR - ( 13 Dec 2018 06:11 )   PT: 14.90 sec;   INR: 1.30 ratio         PTT - ( 13 Dec 2018 06:11 )  PTT:29.2 sec        Radiology:    General: comfortable, NAD  Neurology: A&Ox3, nonfocal  Head:  Normocephalic, atraumatic  ENT:  Mucosa moist, no ulcerations  Neck:  Supple, no JVD,   Skin: no breakdowns (as per RN)  Resp: CTA B/L  CV: RRR, S1S2,   GI: Soft, NT, bowel sounds  MS: dressing lle+ peripheral pulses, FROM all 4 extremity

## 2018-12-13 NOTE — CHART NOTE - NSCHARTNOTEFT_GEN_A_CORE
PACU ANESTHESIA ADMISSION NOTE      Procedure:   Post op diagnosis:      ____  Intubated  TV:______       Rate: ______      FiO2: ______    _x___  Patent Airway    __x__  Full return of protective reflexes    __x__  Full recovery from anesthesia / back to baseline status    Vitals:  T(C): 98.2  HR: 85  BP: 105/65  RR: 16  SpO2: 100%    Mental Status:  ___x_ Awake   __x___ Alert   _____ Drowsy   _____ Sedated    Nausea/Vomiting:  ___x_ NO  ______Yes,   See Post - Op Orders          Pain Scale (0-10):  __0___    Treatment: ____ None    ____ See Post - Op/PCA Orders    Post - Operative Fluids:   _x___ Oral   ____ See Post - Op Orders    Plan: Discharge:   ____Home       __x___Floor     _____Critical Care    _____  Other:_________________    Comments: Monitored anesthesia care/ sedation for procedure. Pt hemodynamically stable throughout procedure. No apparent anesthetic complications.     Pt transferred to PACU. Pt alert and oriented. Vital signs stable. Please discharge to floor once criteria are met.

## 2018-12-13 NOTE — PROGRESS NOTE ADULT - ASSESSMENT
hyponatremia - resolved  hypomagnesemia - better  hyperkalemia - better  rle cellulitis - better  left aka  sepsis  PVD - no intervention after le angio today  HTN  abnl lft's / cholelithiasis on abd sono      plan:    cont low dose lasix and aldactone  iv abx per ID - linezolid and zosyn  f/u cr after angio  off ivf  f/u labs  monitor bp's  cont lopressor  wound care

## 2018-12-13 NOTE — PROGRESS NOTE ADULT - ASSESSMENT
R foot ulcer and high grade stenosis of R SFA    PLAN:    - OR today for angiogram    - care per primary team

## 2018-12-13 NOTE — BRIEF OPERATIVE NOTE - PROCEDURE
<<-----Click on this checkbox to enter Procedure Angiogram, peripheral  12/13/2018  Diagnostic 3rd-order selective right leg angiogram via US-guided left femoral artery access; Mynx closure.  Active  VON2

## 2018-12-13 NOTE — PRE-ANESTHESIA EVALUATION ADULT - NSANTHOSAYNRD_GEN_A_CORE
No. YAMIL screening performed.  STOP BANG Legend: 0-2 = LOW Risk; 3-4 = INTERMEDIATE Risk; 5-8 = HIGH Risk

## 2018-12-13 NOTE — PRE-ANESTHESIA EVALUATION ADULT - NSANTHPMHFT_GEN_ALL_CORE
Hx of CHF per medical record  Pt denies cardiac hx (denies MI, CHF, or stents)  No recent Echocardiogram; cardiomegaly on CXR;

## 2018-12-14 LAB
ANION GAP SERPL CALC-SCNC: 11 MMOL/L — SIGNIFICANT CHANGE UP (ref 7–14)
BASOPHILS # BLD AUTO: 0.02 K/UL — SIGNIFICANT CHANGE UP (ref 0–0.2)
BASOPHILS NFR BLD AUTO: 0.4 % — SIGNIFICANT CHANGE UP (ref 0–1)
BUN SERPL-MCNC: 12 MG/DL — SIGNIFICANT CHANGE UP (ref 10–20)
CALCIUM SERPL-MCNC: 8 MG/DL — LOW (ref 8.5–10.1)
CHLORIDE SERPL-SCNC: 100 MMOL/L — SIGNIFICANT CHANGE UP (ref 98–110)
CO2 SERPL-SCNC: 21 MMOL/L — SIGNIFICANT CHANGE UP (ref 17–32)
CREAT SERPL-MCNC: 0.7 MG/DL — SIGNIFICANT CHANGE UP (ref 0.7–1.5)
EOSINOPHIL # BLD AUTO: 0.03 K/UL — SIGNIFICANT CHANGE UP (ref 0–0.7)
EOSINOPHIL NFR BLD AUTO: 0.6 % — SIGNIFICANT CHANGE UP (ref 0–8)
GLUCOSE SERPL-MCNC: 72 MG/DL — SIGNIFICANT CHANGE UP (ref 70–99)
HCT VFR BLD CALC: 30.5 % — LOW (ref 42–52)
HGB BLD-MCNC: 10.4 G/DL — LOW (ref 14–18)
IMM GRANULOCYTES NFR BLD AUTO: 0.4 % — HIGH (ref 0.1–0.3)
LYMPHOCYTES # BLD AUTO: 1.01 K/UL — LOW (ref 1.2–3.4)
LYMPHOCYTES # BLD AUTO: 20.5 % — SIGNIFICANT CHANGE UP (ref 20.5–51.1)
MAGNESIUM SERPL-MCNC: 1.6 MG/DL — LOW (ref 1.8–2.4)
MCHC RBC-ENTMCNC: 33.4 PG — HIGH (ref 27–31)
MCHC RBC-ENTMCNC: 34.1 G/DL — SIGNIFICANT CHANGE UP (ref 32–37)
MCV RBC AUTO: 98.1 FL — HIGH (ref 80–94)
MONOCYTES # BLD AUTO: 0.34 K/UL — SIGNIFICANT CHANGE UP (ref 0.1–0.6)
MONOCYTES NFR BLD AUTO: 6.9 % — SIGNIFICANT CHANGE UP (ref 1.7–9.3)
NEUTROPHILS # BLD AUTO: 3.5 K/UL — SIGNIFICANT CHANGE UP (ref 1.4–6.5)
NEUTROPHILS NFR BLD AUTO: 71.2 % — SIGNIFICANT CHANGE UP (ref 42.2–75.2)
NRBC # BLD: 0 /100 WBCS — SIGNIFICANT CHANGE UP (ref 0–0)
PLATELET # BLD AUTO: 168 K/UL — SIGNIFICANT CHANGE UP (ref 130–400)
POTASSIUM SERPL-MCNC: 4.5 MMOL/L — SIGNIFICANT CHANGE UP (ref 3.5–5)
POTASSIUM SERPL-SCNC: 4.5 MMOL/L — SIGNIFICANT CHANGE UP (ref 3.5–5)
RBC # BLD: 3.11 M/UL — LOW (ref 4.7–6.1)
RBC # FLD: 12.8 % — SIGNIFICANT CHANGE UP (ref 11.5–14.5)
SODIUM SERPL-SCNC: 132 MMOL/L — LOW (ref 135–146)
WBC # BLD: 4.92 K/UL — SIGNIFICANT CHANGE UP (ref 4.8–10.8)
WBC # FLD AUTO: 4.92 K/UL — SIGNIFICANT CHANGE UP (ref 4.8–10.8)

## 2018-12-14 PROCEDURE — 99232 SBSQ HOSP IP/OBS MODERATE 35: CPT

## 2018-12-14 RX ORDER — CEFTRIAXONE 500 MG/1
INJECTION, POWDER, FOR SOLUTION INTRAMUSCULAR; INTRAVENOUS
Qty: 0 | Refills: 0 | Status: DISCONTINUED | OUTPATIENT
Start: 2018-12-14 | End: 2018-12-18

## 2018-12-14 RX ORDER — CEFTRIAXONE 500 MG/1
2 INJECTION, POWDER, FOR SOLUTION INTRAMUSCULAR; INTRAVENOUS ONCE
Qty: 0 | Refills: 0 | Status: COMPLETED | OUTPATIENT
Start: 2018-12-14 | End: 2018-12-14

## 2018-12-14 RX ORDER — CEFTRIAXONE 500 MG/1
2 INJECTION, POWDER, FOR SOLUTION INTRAMUSCULAR; INTRAVENOUS EVERY 24 HOURS
Qty: 0 | Refills: 0 | Status: DISCONTINUED | OUTPATIENT
Start: 2018-12-15 | End: 2018-12-18

## 2018-12-14 RX ORDER — MAGNESIUM SULFATE 500 MG/ML
2 VIAL (ML) INJECTION EVERY 4 HOURS
Qty: 0 | Refills: 0 | Status: COMPLETED | OUTPATIENT
Start: 2018-12-14 | End: 2018-12-15

## 2018-12-14 RX ADMIN — Medication 25 GRAM(S): at 21:38

## 2018-12-14 RX ADMIN — Medication 1 APPLICATION(S): at 11:04

## 2018-12-14 RX ADMIN — Medication 25 MILLIGRAM(S): at 05:23

## 2018-12-14 RX ADMIN — HEPARIN SODIUM 5000 UNIT(S): 5000 INJECTION INTRAVENOUS; SUBCUTANEOUS at 05:24

## 2018-12-14 RX ADMIN — CEFTRIAXONE 100 GRAM(S): 500 INJECTION, POWDER, FOR SOLUTION INTRAMUSCULAR; INTRAVENOUS at 15:29

## 2018-12-14 RX ADMIN — Medication 650 MILLIGRAM(S): at 21:43

## 2018-12-14 RX ADMIN — SPIRONOLACTONE 25 MILLIGRAM(S): 25 TABLET, FILM COATED ORAL at 05:23

## 2018-12-14 RX ADMIN — PANTOPRAZOLE SODIUM 40 MILLIGRAM(S): 20 TABLET, DELAYED RELEASE ORAL at 05:23

## 2018-12-14 RX ADMIN — HEPARIN SODIUM 5000 UNIT(S): 5000 INJECTION INTRAVENOUS; SUBCUTANEOUS at 14:27

## 2018-12-14 RX ADMIN — Medication 20 MILLIGRAM(S): at 05:23

## 2018-12-14 RX ADMIN — PIPERACILLIN AND TAZOBACTAM 25 GRAM(S): 4; .5 INJECTION, POWDER, LYOPHILIZED, FOR SOLUTION INTRAVENOUS at 05:23

## 2018-12-14 RX ADMIN — LINEZOLID 300 MILLIGRAM(S): 600 INJECTION, SOLUTION INTRAVENOUS at 05:23

## 2018-12-14 RX ADMIN — HEPARIN SODIUM 5000 UNIT(S): 5000 INJECTION INTRAVENOUS; SUBCUTANEOUS at 21:38

## 2018-12-14 NOTE — PROGRESS NOTE ADULT - ASSESSMENT
63M    Hypertension  S/P BKA left    Admitted with Severe Sepsis   RLE cellulitis/lymphangitis  12/3 bcx NGTD  CT LE No CT evidence of necrotizing fasciitis. Diffuse cellulitis from mid third of leg to foot with soft tissue ulcerations along medial lateral distal leg. Medial tibial periostitis worst at the distal third consistent with osteitis  Arterial Duplex High-grade stenosis in the distal superficial femoral artery    - DC abx and monitor  - Vascular following    Spectra [id number] 63M    Hypertension  S/P BKA left    Admitted with Severe Sepsis   RLE cellulitis/lymphangitis  12/3 bcx NGTD  CT LE No CT evidence of necrotizing fasciitis. Diffuse cellulitis from mid third of leg to foot with soft tissue ulcerations along medial lateral distal leg. Medial tibial periostitis worst at the distal third consistent with osteitis  Arterial Duplex High-grade stenosis in the distal superficial femoral artery  ESR/CRP elevated    - ***  - Vascular following    Spectra [id number] 63M    Hypertension  S/P BKA left    Admitted with Severe Sepsis   RLE cellulitis/lymphangitis  12/3 bcx NGTD  CT LE No CT evidence of necrotizing fasciitis. Diffuse cellulitis from mid third of leg to foot with soft tissue ulcerations along medial lateral distal leg. Medial tibial periostitis worst at the distal third consistent with osteitis  Arterial Duplex High-grade stenosis in the distal superficial femoral artery  ESR/CRP elevated    - Given Medial tibial periostitis worst at the distal third consistent with osteitis and elevated inflamm markers, would obtain RLE MRI to r/o osteomyelitis as would change treatment course  - Can change to abx Ceftriaxone 2g q24h  - Vascular following    Spectra [id number]

## 2018-12-14 NOTE — PROGRESS NOTE ADULT - ASSESSMENT
Assessment:  63y Male patient admitted S/P Diagnostic 3rd-order selective right leg angiogram via US-guided left femoral artery access, with the above physical exam, labs, and imaging findings.    Plan:  -Ambulate as tolerated  -No further surgical intervention at this time  -Pain Control as needed  -Encourage ambulation and Incentive Spirometer (10x/hour when awake) use  -Continue GI and DVT prophylaxis  -Strict Input and output monitoring  -Please have pt f/u in 2 weeks upon d/c with Dr. Young.    Date/Time: 12-14-18 @ 06:16 Assessment:  63y Male patient admitted S/P Diagnostic 3rd-order selective right leg angiogram via US-guided left femoral artery access, with the above physical exam, labs, and imaging findings.    Plan:  -Ambulate as tolerated  -No further surgical intervention at this time  -Pain Control as needed  -Encourage ambulation and Incentive Spirometer (10x/hour when awake) use  -Continue GI and DVT prophylaxis  -Please have pt f/u in 2 weeks upon d/c with Dr. Young.    Date/Time: 12-14-18 @ 06:16

## 2018-12-14 NOTE — PROGRESS NOTE ADULT - ASSESSMENT
hyponatremia - resolved  hypomagnesemia - better  hyperkalemia - better  rle cellulitis - better  left aka  sepsis  PVD - no intervention after le angio today  HTN  abnl lft's / cholelithiasis on abd sono      plan:    cont low dose lasix and aldactone  iv abx per ID - linezolid and zosyn  mri per id  f/u bmp  off ivf  monitor bp's  cont lopressor  wound care

## 2018-12-14 NOTE — PROGRESS NOTE ADULT - SUBJECTIVE AND OBJECTIVE BOX
JDUY PEÑALOZA  63y, Male      OVERNIGHT EVENTS:  no acute events overnight  s/p angio    ROS negative except as per above    VITALS:  T(F): 97.8, Max: 98.2 (12-13-18 @ 19:30)  HR: 88  BP: 105/54  RR: 18Vital Signs Last 24 Hrs  T(C): 36.6 (14 Dec 2018 07:38), Max: 36.8 (13 Dec 2018 19:30)  T(F): 97.8 (14 Dec 2018 07:38), Max: 98.2 (13 Dec 2018 19:30)  HR: 88 (14 Dec 2018 07:38) (87 - 100)  BP: 105/54 (14 Dec 2018 07:38) (105/54 - 125/58)  BP(mean): --  RR: 18 (14 Dec 2018 07:38) (18 - 18)  SpO2: --    PHYSICAL EXAM  Gen: Awake and alert, non-toxic appearing, NAD  HEENT: NCAT. EOMI. MMM.   Neck: Supple  CV: RRR  Lungs: CTAB  Abd: Soft. NTND  Extr: wwp, RLE with decreased edema, erythema. superficial ulcers   Skin: no rash  Neuro: No focal deficits  Lines: clean      TESTS & MEASUREMENTS:                        10.4   4.92  )-----------( 168      ( 14 Dec 2018 05:14 )             30.5     12-14    132<L>  |  100  |  12  ----------------------------<  72  4.5   |  21  |  0.7    Ca    8.0<L>      14 Dec 2018 05:14  Phos  2.9     12-12  Mg     1.6     12-14    TPro  8.0  /  Alb  2.4<L>  /  TBili  1.9<H>  /  DBili  0.7<H>  /  AST  72<H>  /  ALT  64<H>  /  AlkPhos  170<H>  12-13    LIVER FUNCTIONS - ( 13 Dec 2018 05:41 )  Alb: 2.4 g/dL / Pro: 8.0 g/dL / ALK PHOS: 170 U/L / ALT: 64 U/L / AST: 72 U/L / GGT: x                 RADIOLOGY & ADDITIONAL TESTS:    ANTIBIOTICS:    clindamycin IVPB   100 mL/Hr IV Intermittent (12-03-18 @ 12:10)    clindamycin IVPB   100 mL/Hr IV Intermittent (12-05-18 @ 21:50)   100 mL/Hr IV Intermittent (12-05-18 @ 14:55)   100 mL/Hr IV Intermittent (12-05-18 @ 05:22)   100 mL/Hr IV Intermittent (12-04-18 @ 21:18)   100 mL/Hr IV Intermittent (12-04-18 @ 13:48)   100 mL/Hr IV Intermittent (12-04-18 @ 05:07)   100 mL/Hr IV Intermittent (12-03-18 @ 21:14)    linezolid  IVPB   300 mL/Hr IV Intermittent (12-06-18 @ 00:25)    linezolid  IVPB   300 mL/Hr IV Intermittent (12-13-18 @ 06:21)   300 mL/Hr IV Intermittent (12-12-18 @ 17:51)   300 mL/Hr IV Intermittent (12-12-18 @ 05:10)   300 mL/Hr IV Intermittent (12-11-18 @ 18:20)   300 mL/Hr IV Intermittent (12-11-18 @ 08:00)   300 mL/Hr IV Intermittent (12-10-18 @ 22:52)   300 mL/Hr IV Intermittent (12-10-18 @ 05:40)   300 mL/Hr IV Intermittent (12-09-18 @ 17:46)   300 mL/Hr IV Intermittent (12-09-18 @ 05:31)   300 mL/Hr IV Intermittent (12-08-18 @ 17:34)   300 mL/Hr IV Intermittent (12-08-18 @ 06:11)   300 mL/Hr IV Intermittent (12-07-18 @ 17:28)   300 mL/Hr IV Intermittent (12-07-18 @ 06:58)   300 mL/Hr IV Intermittent (12-06-18 @ 17:45)   300 mL/Hr IV Intermittent (12-06-18 @ 06:07)    linezolid  IVPB   300 mL/Hr IV Intermittent (12-13-18 @ 11:43)    linezolid  IVPB   300 mL/Hr IV Intermittent (12-14-18 @ 05:23)    piperacillin/tazobactam IVPB.   25 mL/Hr IV Intermittent (12-13-18 @ 06:21)   25 mL/Hr IV Intermittent (12-12-18 @ 21:37)   25 mL/Hr IV Intermittent (12-12-18 @ 14:33)   25 mL/Hr IV Intermittent (12-12-18 @ 05:10)   25 mL/Hr IV Intermittent (12-11-18 @ 21:28)   25 mL/Hr IV Intermittent (12-11-18 @ 14:16)   25 mL/Hr IV Intermittent (12-11-18 @ 05:31)   25 mL/Hr IV Intermittent (12-10-18 @ 22:52)   25 mL/Hr IV Intermittent (12-10-18 @ 14:19)   25 mL/Hr IV Intermittent (12-10-18 @ 05:40)   25 mL/Hr IV Intermittent (12-09-18 @ 21:21)   25 mL/Hr IV Intermittent (12-09-18 @ 13:55)   25 mL/Hr IV Intermittent (12-09-18 @ 05:31)   25 mL/Hr IV Intermittent (12-08-18 @ 22:29)   25 mL/Hr IV Intermittent (12-08-18 @ 14:25)   25 mL/Hr IV Intermittent (12-08-18 @ 06:16)   25 mL/Hr IV Intermittent (12-07-18 @ 22:01)   25 mL/Hr IV Intermittent (12-07-18 @ 14:46)   25 mL/Hr IV Intermittent (12-07-18 @ 06:58)   25 mL/Hr IV Intermittent (12-06-18 @ 22:42)   25 mL/Hr IV Intermittent (12-06-18 @ 14:31)   25 mL/Hr IV Intermittent (12-06-18 @ 06:07)   25 mL/Hr IV Intermittent (12-05-18 @ 21:49)   25 mL/Hr IV Intermittent (12-05-18 @ 14:55)   25 mL/Hr IV Intermittent (12-05-18 @ 05:22)   25 mL/Hr IV Intermittent (12-04-18 @ 21:18)   25 mL/Hr IV Intermittent (12-04-18 @ 13:48)   25 mL/Hr IV Intermittent (12-04-18 @ 05:07)   25 mL/Hr IV Intermittent (12-03-18 @ 21:18)    piperacillin/tazobactam IVPB.   200 mL/Hr IV Intermittent (12-03-18 @ 12:16)    piperacillin/tazobactam IVPB.   25 mL/Hr IV Intermittent (12-14-18 @ 05:23)   25 mL/Hr IV Intermittent (12-13-18 @ 21:38)   25 mL/Hr IV Intermittent (12-13-18 @ 14:58)

## 2018-12-14 NOTE — PROGRESS NOTE ADULT - ASSESSMENT
ASSESSMENT & PLAN  64yo male with past medical history of HTN, PAD s/p AKA, and GERD presented with fever and worsening lower extremity cellulitis, transferred from [facility] for right leg angiogram    #) Cellulitis with nonhealing ulcer - improved  - No signs of systemic infection  - Likely 2/2 peripheral arterial disease, arterial duplex shows high grade stenosis on distal superficial femoral artery  - Vascular recs appreciated: no significant disease, no further surgical intervention, outpatient follow up 2 wks with Dr. Young  - No need for antibiotics for now, s/p 10 days of abx  - Continue wound care daily  - Pain control PRN   - PT eval  - Physiatry: SNF vs home with [facility] ([facility])    #) Congestive heart failure with unknown EF  - No signs of decompensation, CXR shows cardiomegaly  - Continue Toprol, Lasix and aldactone  - Nephro following    #) Hypertension  - BP well controlled  - Continue Toprol    #) Transaminitis - improved  - US abdominal shows cholelithiasis  - Continue to trend LFTs  - Avoid any hepatotoxic drugs    #) GERD  - Continue PPI  DVT ppx: heparin subQ  GI ppx: Protonix  Diet: DASH  Activity: Ambulate as tolerated  Lines: Peripheral IVs  Code status: Full code  Dispo: d/c abhi

## 2018-12-14 NOTE — PROGRESS NOTE ADULT - SUBJECTIVE AND OBJECTIVE BOX
Progress Note: General Surgery  Patient: JUDY PEÑALOZA , 63y (1955)Male   MRN: [id number]  Location: [address]  Visit: 12-03-18 Inpatient  Date: 12-14-18 @ 06:16  Hospital Day: 11D  Post-op Day:     Procedure/Diagnosis: Angiogram, peripheral  Events over 24h: Pt doing well post procedure, laying in bed comfortably. Denies any pain. Able to move RLE. L groin with sterile dressing in place c/d/i, no swelling or hematoma presents. Able to doppler DP/PT.     Vitals: T(F): 97.7 (12-14-18 @ 04:00), Max: 98.4 (12-13-18 @ 12:07)  HR: 89 (12-14-18 @ 04:00)  BP: 114/55 (12-14-18 @ 04:00) (99/63 - 132/59)  RR: 18 (12-14-18 @ 04:00)  SpO2: 97% (12-13-18 @ 12:07)      Diet: Diet, Consistent Carbohydrate/No Snacks (12-13-18 @ 10:19)    IV Fluids: yes no , Type:   Bowel Function: Bowel movement [  ] Flatus [  ]   Intake and Output:   12-12-18 @ 07:01  -  12-13-18 @ 07:00  --------------------------------------------------------  IN: 2130 mL    12-13-18 @ 07:01  -  12-14-18 @ 06:16  --------------------------------------------------------  IN: 0 mL       12-12-18 @ 07:01  -  12-13-18 @ 07:00  --------------------------------------------------------  OUT:    Voided: 2800 mL  Total OUT: 2800 mL      12-13-18 @ 07:01  -  12-14-18 @ 06:16  --------------------------------------------------------  OUT:    Voided: 1240 mL  Total OUT: 1240 mL        12-12-18 @ 07:01  -  12-13-18 @ 07:00  --------------------------------------------------------  NET: -670 mL    12-13-18 @ 07:01  -  12-14-18 @ 06:16  --------------------------------------------------------  NET: -1240 mL        Physical Examination:  GENERAL: NAD, well-appearing  CHEST/LUNG: no obvious increased wob  EXTREMITIES:  L groin with sterile dressing in place c/d/i, no swelling or hematoma presents. Able to doppler DP/PT.     Medications: [Standing]  chlorhexidine 4% Liquid 1 Application(s) Topical <User Schedule>  furosemide    Tablet 20 milliGRAM(s) Oral daily  heparin  Injectable 5000 Unit(s) SubCutaneous every 8 hours  linezolid  IVPB 600 milliGRAM(s) IV Intermittent every 12 hours  linezolid  IVPB      metoprolol succinate ER 25 milliGRAM(s) Oral daily  pantoprazole    Tablet 40 milliGRAM(s) Oral before breakfast  piperacillin/tazobactam IVPB. 3.375 Gram(s) IV Intermittent every 8 hours  silver sulfADIAZINE 1% Cream 1 Application(s) Topical daily  spironolactone 25 milliGRAM(s) Oral daily  vitamin A &amp; D Ointment 1 Application(s) Topical every 6 hours    DVT Prophylaxis: heparin  Injectable 5000 Unit(s) SubCutaneous every 8 hours    GI Prophylaxis: pantoprazole    Tablet 40 milliGRAM(s) Oral before breakfast    Antibiotics: linezolid  IVPB 600 milliGRAM(s) IV Intermittent every 12 hours  linezolid  IVPB      piperacillin/tazobactam IVPB. 3.375 Gram(s) IV Intermittent every 8 hours    Anticoagulation:   Medications:[PRN]  acetaminophen   Tablet .. 650 milliGRAM(s) Oral every 6 hours PRN  ondansetron Injectable 4 milliGRAM(s) IV Push every 8 hours PRN  oxyCODONE    IR 5 milliGRAM(s) Oral every 8 hours PRN    Labs:                        12.4   6.09  )-----------( 135      ( 13 Dec 2018 06:11 )             35.6   12-13    135  |  100  |  10  ----------------------------<  76  4.6   |  23  |  0.7    Ca    8.4<L>      13 Dec 2018 06:11  Phos  2.9     12-12  Mg     2.1     12-13    TPro  8.0  /  Alb  2.4<L>  /  TBili  1.9<H>  /  DBili  0.7<H>  /  AST  72<H>  /  ALT  64<H>  /  AlkPhos  170<H>  12-13  LIVER FUNCTIONS - ( 13 Dec 2018 05:41 )  Alb: 2.4 g/dL / Pro: 8.0 g/dL / ALK PHOS: 170 U/L / ALT: 64 U/L / AST: 72 U/L / GGT: x         PT/INR - ( 13 Dec 2018 06:11 )   PT: 14.90 sec;   INR: 1.30 ratio         PTT - ( 13 Dec 2018 06:11 )  PTT:29.2 sec    Urine/Micro:    Imaging:  None/24h Progress Note: General Surgery  Patient: JUDY PEÑALOZA , 63y (1955)Male   MRN: [id number]  Location: [address]  Visit: 12-03-18 Inpatient  Date: 12-14-18 @ 06:16  Hospital Day: 11D  Post-op Day:     Procedure/Diagnosis: RLE angiogram: 3 vessels runoff  Events over 24h: Pt doing well post procedure, laying in bed comfortably. Denies any pain. Able to move RLE. L groin with sterile dressing in place c/d/i, no swelling or hematoma presents. Able to doppler DP/PT.     Vitals: T(F): 97.7 (12-14-18 @ 04:00), Max: 98.4 (12-13-18 @ 12:07)  HR: 89 (12-14-18 @ 04:00)  BP: 114/55 (12-14-18 @ 04:00) (99/63 - 132/59)  RR: 18 (12-14-18 @ 04:00)  SpO2: 97% (12-13-18 @ 12:07)      Diet: Diet, Consistent Carbohydrate/No Snacks (12-13-18 @ 10:19)    IV Fluids: yes no , Type:   Bowel Function: Bowel movement [  ] Flatus [  ]   Intake and Output:   12-12-18 @ 07:01  -  12-13-18 @ 07:00  --------------------------------------------------------  IN: 2130 mL    12-13-18 @ 07:01  -  12-14-18 @ 06:16  --------------------------------------------------------  IN: 0 mL       12-12-18 @ 07:01  -  12-13-18 @ 07:00  --------------------------------------------------------  OUT:    Voided: 2800 mL  Total OUT: 2800 mL      12-13-18 @ 07:01  -  12-14-18 @ 06:16  --------------------------------------------------------  OUT:    Voided: 1240 mL  Total OUT: 1240 mL        12-12-18 @ 07:01  -  12-13-18 @ 07:00  --------------------------------------------------------  NET: -670 mL    12-13-18 @ 07:01  -  12-14-18 @ 06:16  --------------------------------------------------------  NET: -1240 mL        Physical Examination:  GENERAL: NAD, well-appearing  CHEST/LUNG: no obvious increased wob  EXTREMITIES:  L groin with sterile dressing in place c/d/i, no swelling or hematoma presents. Able to doppler DP/PT.     Medications: [Standing]  chlorhexidine 4% Liquid 1 Application(s) Topical <User Schedule>  furosemide    Tablet 20 milliGRAM(s) Oral daily  heparin  Injectable 5000 Unit(s) SubCutaneous every 8 hours  linezolid  IVPB 600 milliGRAM(s) IV Intermittent every 12 hours  linezolid  IVPB      metoprolol succinate ER 25 milliGRAM(s) Oral daily  pantoprazole    Tablet 40 milliGRAM(s) Oral before breakfast  piperacillin/tazobactam IVPB. 3.375 Gram(s) IV Intermittent every 8 hours  silver sulfADIAZINE 1% Cream 1 Application(s) Topical daily  spironolactone 25 milliGRAM(s) Oral daily  vitamin A &amp; D Ointment 1 Application(s) Topical every 6 hours    DVT Prophylaxis: heparin  Injectable 5000 Unit(s) SubCutaneous every 8 hours    GI Prophylaxis: pantoprazole    Tablet 40 milliGRAM(s) Oral before breakfast    Antibiotics: linezolid  IVPB 600 milliGRAM(s) IV Intermittent every 12 hours  linezolid  IVPB      piperacillin/tazobactam IVPB. 3.375 Gram(s) IV Intermittent every 8 hours    Anticoagulation:   Medications:[PRN]  acetaminophen   Tablet .. 650 milliGRAM(s) Oral every 6 hours PRN  ondansetron Injectable 4 milliGRAM(s) IV Push every 8 hours PRN  oxyCODONE    IR 5 milliGRAM(s) Oral every 8 hours PRN    Labs:                        12.4   6.09  )-----------( 135      ( 13 Dec 2018 06:11 )             35.6   12-13    135  |  100  |  10  ----------------------------<  76  4.6   |  23  |  0.7    Ca    8.4<L>      13 Dec 2018 06:11  Phos  2.9     12-12  Mg     2.1     12-13    TPro  8.0  /  Alb  2.4<L>  /  TBili  1.9<H>  /  DBili  0.7<H>  /  AST  72<H>  /  ALT  64<H>  /  AlkPhos  170<H>  12-13  LIVER FUNCTIONS - ( 13 Dec 2018 05:41 )  Alb: 2.4 g/dL / Pro: 8.0 g/dL / ALK PHOS: 170 U/L / ALT: 64 U/L / AST: 72 U/L / GGT: x         PT/INR - ( 13 Dec 2018 06:11 )   PT: 14.90 sec;   INR: 1.30 ratio         PTT - ( 13 Dec 2018 06:11 )  PTT:29.2 sec    Urine/Micro:    Imaging:  None/24h

## 2018-12-14 NOTE — CHART NOTE - NSCHARTNOTEFT_GEN_A_CORE
Limited note by TANO Britt.  Spoken with patient at bedside, speaks limited English, mostly [place].  Celulitis- RLE erythema, echymosis.  R foot and R leg 1+ edema  LBM today likely per pt.  on CC no snacks - pt's plate was nearly 95% consumed, pt says there was a lot of food for him.  ID consulted for cellulitis w/ non healing ulcer - improved.  CHF nephro following. Improved transaminitis. d/c planning.    labs and meds reviewed    Remains not at risk

## 2018-12-14 NOTE — PROGRESS NOTE ADULT - ASSESSMENT
JUDY PEÑALOZA 63y Male  MRN#: [id number]   CODE STATUS: Full code    SUBJECTIVE  Patient is a 63y old Male who presented with a chief complaint of fever and RLE erythema for 1 week  Currently admitted to medicine with the primary diagnosis of right lower extremity cellulitis 2/2 PAD  Today is hospital day 11d, and this morning he is resting comfortably in bed and reports no overnight events. Patient's pain has improved, but made worse if he dangles his leg down the bed. Pain improved when he elevates it. Patient denies fever/chills, chest pain, shortness of breath, and abdominal pain, or any urinary symptoms.      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 3.375 Gram(s) IV Intermittent every 8 hours  silver sulfADIAZINE 1% Cream 1 Application(s) Topical daily  spironolactone 25 milliGRAM(s) Oral daily  vitamin A &amp; D Ointment 1 Application(s) Topical every 6 hours    PRN MEDICATIONS  acetaminophen   Tablet .. 650 milliGRAM(s) Oral every 6 hours PRN  ondansetron Injectable 4 milliGRAM(s) IV Push every 8 hours PRN  oxyCODONE    IR 5 milliGRAM(s) Oral every 8 hours PRN      VITAL SIGNS: Last 24 Hours  T(C): 36.5 (14 Dec 2018 04:00), Max: 36.9 (13 Dec 2018 12:07)  T(F): 97.7 (14 Dec 2018 04:00), Max: 98.4 (13 Dec 2018 12:07)  HR: 89 (14 Dec 2018 04:00) (82 - 100)  BP: 114/55 (14 Dec 2018 04:00) (99/63 - 132/59)  BP(mean): --  RR: 18 (14 Dec 2018 04:00) (12 - 21)  SpO2: 97% (13 Dec 2018 12:07) (96% - 100%)    LABS:                        12.4   6.09  )-----------( 135      ( 13 Dec 2018 06:11 )             35.6     12-13    135  |  100  |  10  ----------------------------<  76  4.6   |  23  |  0.7    Ca    8.4<L>      13 Dec 2018 06:11  Phos  2.9     12-12  Mg     2.1     12-13    TPro  8.0  /  Alb  2.4<L>  /  TBili  1.9<H>  /  DBili  0.7<H>  /  AST  72<H>  /  ALT  64<H>  /  AlkPhos  170<H>  12-13    PT/INR - ( 13 Dec 2018 06:11 )   PT: 14.90 sec;   INR: 1.30 ratio         PTT - ( 13 Dec 2018 06:11 )  PTT:29.2 sec    RADIOLOGY:      PHYSICAL EXAM:  GENERAL: NAD, well-developed, AAOx3  HEENT:  Atraumatic, Normocephalic. Conjunctiva and sclera clear, No JVD  PULMONARY: Clear to auscultation bilaterally;  CARDIOVASCULAR: Regular rate and rhythm; No murmurs  GASTROINTESTINAL: Obese abdomen, soft, nontender, nondistended; Bowel sounds present  MUSCULOSKELETAL:  2+ pulse on RLE, erythema, and swelling right calf, with multiple medial ulcers, with clean dressing on top, no tenderness  NEUROLOGY: non-focal  SKIN: No rashes or lesions      ADMISSION SUMMARY  Patient is a 63y old Male who presented with a chief complaint of fever and RLE erythema for 1 week  Currently admitted to medicine with the primary diagnosis of right lower extremity cellulitis and ulcer 2/2 PAD.       ASSESSMENT & PLAN  62yo male with past medical history of HTN, PAD s/p AKA, and GERD presented with fever and worsening lower extremity cellulitis, transferred from [facility] for right leg angiogram    #) Cellulitis with nonhealing ulcer - improved  - No signs of systemic infection  - Likely 2/2 peripheral arterial disease, arterial duplex shows high grade stenosis on distal superficial femoral artery  - Vascular recs appreciated: no significant disease, no further surgical intervention, outpatient follow up 2 wks with Dr. Young  - No need for antibiotics for now, s/p 10 days of abx  - Continue wound care daily  - Pain control PRN   - PT eval  - Physiatry: SNF vs home with [facility] ([facility])    #) Congestive heart failure with unknown EF  - No signs of decompensation, CXR shows cardiomegaly  - Continue Toprol, Lasix and aldactone  - Nephro following    #) Hypertension  - BP well controlled  - Continue Toprol    #) Transaminitis - improved  - US abdominal shows cholelithiasis  - Continue to trend LFTs  - Avoid any hepatotoxic drugs    #) GERD  - Continue PPI      DVT ppx: heparin subQ  GI ppx: Protonix  Diet: DASH  Activity: Ambulate as tolerated  Lines: Peripheral IVs  Code status: Full code  Dispo: d/c planning JUDY PEÑALOZA 63y Male  MRN#: [id number]   CODE STATUS: Full code    SUBJECTIVE  Patient is a 63y old Male who presented with a chief complaint of fever and RLE erythema for 1 week  Currently admitted to medicine with the primary diagnosis of right lower extremity cellulitis 2/2 PAD  Today is hospital day 11d, and this morning he is resting comfortably in bed and reports no overnight events. Patient's pain has improved, but made worse if he dangles his leg down the bed. Pain improved when he elevates it. Patient denies fever/chills, chest pain, shortness of breath, and abdominal pain, or any urinary symptoms.      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 3.375 Gram(s) IV Intermittent every 8 hours  silver sulfADIAZINE 1% Cream 1 Application(s) Topical daily  spironolactone 25 milliGRAM(s) Oral daily  vitamin A &amp; D Ointment 1 Application(s) Topical every 6 hours    PRN MEDICATIONS  acetaminophen   Tablet .. 650 milliGRAM(s) Oral every 6 hours PRN  ondansetron Injectable 4 milliGRAM(s) IV Push every 8 hours PRN  oxyCODONE    IR 5 milliGRAM(s) Oral every 8 hours PRN      VITAL SIGNS: Last 24 Hours  T(C): 36.5 (14 Dec 2018 04:00), Max: 36.9 (13 Dec 2018 12:07)  T(F): 97.7 (14 Dec 2018 04:00), Max: 98.4 (13 Dec 2018 12:07)  HR: 89 (14 Dec 2018 04:00) (82 - 100)  BP: 114/55 (14 Dec 2018 04:00) (99/63 - 132/59)  BP(mean): --  RR: 18 (14 Dec 2018 04:00) (12 - 21)  SpO2: 97% (13 Dec 2018 12:07) (96% - 100%)    LABS:                        12.4   6.09  )-----------( 135      ( 13 Dec 2018 06:11 )             35.6     12-13    135  |  100  |  10  ----------------------------<  76  4.6   |  23  |  0.7    Ca    8.4<L>      13 Dec 2018 06:11  Phos  2.9     12-12  Mg     2.1     12-13    TPro  8.0  /  Alb  2.4<L>  /  TBili  1.9<H>  /  DBili  0.7<H>  /  AST  72<H>  /  ALT  64<H>  /  AlkPhos  170<H>  12-13    PT/INR - ( 13 Dec 2018 06:11 )   PT: 14.90 sec;   INR: 1.30 ratio         PTT - ( 13 Dec 2018 06:11 )  PTT:29.2 sec    RADIOLOGY:  < from: CT Lower Extremity No Cont, Right (12.03.18 @ 14:39) >  1. No CT evidence of necrotizing fasciitis  2. Diffuse cellulitis from mid third of leg to foot with soft tissue   ulcerations along medial lateral distal leg  3. Medial tibial periostitis worst at the distal third consistent with   osteitis    < end of copied text >      PHYSICAL EXAM:  GENERAL: NAD, well-developed, AAOx3  HEENT:  Atraumatic, Normocephalic. Conjunctiva and sclera clear, No JVD  PULMONARY: Clear to auscultation bilaterally;  CARDIOVASCULAR: Regular rate and rhythm; No murmurs  GASTROINTESTINAL: Obese abdomen, soft, nontender, nondistended; Bowel sounds present  MUSCULOSKELETAL:  2+ pulse on RLE, erythema, and swelling right calf, with multiple medial ulcers, with clean dressing on top, no tenderness  NEUROLOGY: non-focal  SKIN: No rashes or lesions      ADMISSION SUMMARY  Patient is a 63y old Male who presented with a chief complaint of fever and RLE erythema for 1 week  Currently admitted to medicine with the primary diagnosis of right lower extremity cellulitis and ulcer 2/2 PAD      ASSESSMENT & PLAN  64yo male with past medical history of HTN, PAD s/p AKA, and GERD presented with fever and worsening lower extremity cellulitis, transferred from [facility] for right leg angiogram    #) Cellulitis with nonhealing ulcer - improved  - No signs of systemic infection  - Likely 2/2 peripheral arterial disease, arterial duplex shows high grade stenosis on distal superficial femoral artery  - Vascular recs appreciated: no significant disease, no further surgical intervention, outpatient follow up 2 wks with Dr. Young  - CT in 12/3 shows medial tibial periostitis worse at the distal third consistent with osteitis, concern for underlying OM, ID recommends MRI RLE and Ceftriaxone 2g Q24hrs  - ESR/CRP elevated  - Continue wound care daily  - Pain control PRN   - PT eval  - Physiatry: SNF vs home with [facility] ([facility])    #) Congestive heart failure with unknown EF  - No signs of decompensation, CXR shows cardiomegaly  - Continue Toprol, Lasix and aldactone  - Nephro following    #) Hypertension  - BP well controlled  - Continue Toprol    #) Transaminitis - improved  - US abdominal shows cholelithiasis  - Continue to trend LFTs, trending down  - Avoid any hepatotoxic drugs    #) GERD  - Continue PPI      DVT ppx: heparin subQ  GI ppx: Protonix  Diet: DASH  Activity: Ambulate as tolerated  Lines: Peripheral IVs  Code status: Full code  Dispo: d/c planning

## 2018-12-15 LAB
ANION GAP SERPL CALC-SCNC: 11 MMOL/L — SIGNIFICANT CHANGE UP (ref 7–14)
BASOPHILS # BLD AUTO: 0.02 K/UL — SIGNIFICANT CHANGE UP (ref 0–0.2)
BASOPHILS NFR BLD AUTO: 0.4 % — SIGNIFICANT CHANGE UP (ref 0–1)
BUN SERPL-MCNC: 11 MG/DL — SIGNIFICANT CHANGE UP (ref 10–20)
CALCIUM SERPL-MCNC: 8.6 MG/DL — SIGNIFICANT CHANGE UP (ref 8.5–10.1)
CHLORIDE SERPL-SCNC: 99 MMOL/L — SIGNIFICANT CHANGE UP (ref 98–110)
CO2 SERPL-SCNC: 24 MMOL/L — SIGNIFICANT CHANGE UP (ref 17–32)
CREAT SERPL-MCNC: 0.7 MG/DL — SIGNIFICANT CHANGE UP (ref 0.7–1.5)
EOSINOPHIL # BLD AUTO: 0.02 K/UL — SIGNIFICANT CHANGE UP (ref 0–0.7)
EOSINOPHIL NFR BLD AUTO: 0.4 % — SIGNIFICANT CHANGE UP (ref 0–8)
GLUCOSE BLDC GLUCOMTR-MCNC: 103 MG/DL — HIGH (ref 70–99)
GLUCOSE BLDC GLUCOMTR-MCNC: 88 MG/DL — SIGNIFICANT CHANGE UP (ref 70–99)
GLUCOSE BLDC GLUCOMTR-MCNC: 91 MG/DL — SIGNIFICANT CHANGE UP (ref 70–99)
GLUCOSE BLDC GLUCOMTR-MCNC: 98 MG/DL — SIGNIFICANT CHANGE UP (ref 70–99)
GLUCOSE SERPL-MCNC: 78 MG/DL — SIGNIFICANT CHANGE UP (ref 70–99)
HCT VFR BLD CALC: 31.7 % — LOW (ref 42–52)
HGB BLD-MCNC: 10.8 G/DL — LOW (ref 14–18)
IMM GRANULOCYTES NFR BLD AUTO: 0.4 % — HIGH (ref 0.1–0.3)
LYMPHOCYTES # BLD AUTO: 0.99 K/UL — LOW (ref 1.2–3.4)
LYMPHOCYTES # BLD AUTO: 21.9 % — SIGNIFICANT CHANGE UP (ref 20.5–51.1)
MAGNESIUM SERPL-MCNC: 2.1 MG/DL — SIGNIFICANT CHANGE UP (ref 1.8–2.4)
MCHC RBC-ENTMCNC: 33.5 PG — HIGH (ref 27–31)
MCHC RBC-ENTMCNC: 34.1 G/DL — SIGNIFICANT CHANGE UP (ref 32–37)
MCV RBC AUTO: 98.4 FL — HIGH (ref 80–94)
MONOCYTES # BLD AUTO: 0.35 K/UL — SIGNIFICANT CHANGE UP (ref 0.1–0.6)
MONOCYTES NFR BLD AUTO: 7.7 % — SIGNIFICANT CHANGE UP (ref 1.7–9.3)
NEUTROPHILS # BLD AUTO: 3.13 K/UL — SIGNIFICANT CHANGE UP (ref 1.4–6.5)
NEUTROPHILS NFR BLD AUTO: 69.2 % — SIGNIFICANT CHANGE UP (ref 42.2–75.2)
NRBC # BLD: 0 /100 WBCS — SIGNIFICANT CHANGE UP (ref 0–0)
PLATELET # BLD AUTO: 92 K/UL — LOW (ref 130–400)
POTASSIUM SERPL-MCNC: 4.5 MMOL/L — SIGNIFICANT CHANGE UP (ref 3.5–5)
POTASSIUM SERPL-SCNC: 4.5 MMOL/L — SIGNIFICANT CHANGE UP (ref 3.5–5)
RBC # BLD: 3.22 M/UL — LOW (ref 4.7–6.1)
RBC # FLD: 12.6 % — SIGNIFICANT CHANGE UP (ref 11.5–14.5)
SODIUM SERPL-SCNC: 134 MMOL/L — LOW (ref 135–146)
WBC # BLD: 4.53 K/UL — LOW (ref 4.8–10.8)
WBC # FLD AUTO: 4.53 K/UL — LOW (ref 4.8–10.8)

## 2018-12-15 PROCEDURE — 99232 SBSQ HOSP IP/OBS MODERATE 35: CPT

## 2018-12-15 RX ADMIN — Medication 25 MILLIGRAM(S): at 05:35

## 2018-12-15 RX ADMIN — CEFTRIAXONE 100 GRAM(S): 500 INJECTION, POWDER, FOR SOLUTION INTRAMUSCULAR; INTRAVENOUS at 14:26

## 2018-12-15 RX ADMIN — Medication 1 APPLICATION(S): at 11:59

## 2018-12-15 RX ADMIN — Medication 20 MILLIGRAM(S): at 05:35

## 2018-12-15 RX ADMIN — HEPARIN SODIUM 5000 UNIT(S): 5000 INJECTION INTRAVENOUS; SUBCUTANEOUS at 14:30

## 2018-12-15 RX ADMIN — SPIRONOLACTONE 25 MILLIGRAM(S): 25 TABLET, FILM COATED ORAL at 05:35

## 2018-12-15 RX ADMIN — HEPARIN SODIUM 5000 UNIT(S): 5000 INJECTION INTRAVENOUS; SUBCUTANEOUS at 21:32

## 2018-12-15 RX ADMIN — Medication 1 APPLICATION(S): at 05:36

## 2018-12-15 RX ADMIN — Medication 650 MILLIGRAM(S): at 19:56

## 2018-12-15 RX ADMIN — HEPARIN SODIUM 5000 UNIT(S): 5000 INJECTION INTRAVENOUS; SUBCUTANEOUS at 05:36

## 2018-12-15 RX ADMIN — PANTOPRAZOLE SODIUM 40 MILLIGRAM(S): 20 TABLET, DELAYED RELEASE ORAL at 05:35

## 2018-12-15 RX ADMIN — Medication 25 GRAM(S): at 02:05

## 2018-12-15 RX ADMIN — Medication 1 APPLICATION(S): at 01:11

## 2018-12-15 RX ADMIN — Medication 650 MILLIGRAM(S): at 19:17

## 2018-12-15 NOTE — PROGRESS NOTE ADULT - ATTENDING COMMENTS
agree
agree
Patent vessels on angiogram. Has venous stasis ulcers with cellulitis. Recommend Abx. F/u vascular office in 2 weeks for possible Unna boots.
agree

## 2018-12-15 NOTE — PROGRESS NOTE ADULT - SUBJECTIVE AND OBJECTIVE BOX
Patient was seen and examined. Spoke with RN. Chart reviewed.    No events overnight.  Vital Signs Last 24 Hrs  T(F): 96.5 (15 Dec 2018 07:45), Max: 98.6 (14 Dec 2018 15:32)  HR: 91 (15 Dec 2018 07:45) (85 - 97)  BP: 107/50 (15 Dec 2018 07:45) (100/50 - 108/51)  SpO2: --  MEDICATIONS  (STANDING):  cefTRIAXone   IVPB 2 Gram(s) IV Intermittent every 24 hours  cefTRIAXone   IVPB      chlorhexidine 4% Liquid 1 Application(s) Topical <User Schedule>  furosemide    Tablet 20 milliGRAM(s) Oral daily  heparin  Injectable 5000 Unit(s) SubCutaneous every 8 hours  metoprolol succinate ER 25 milliGRAM(s) Oral daily  pantoprazole    Tablet 40 milliGRAM(s) Oral before breakfast  silver sulfADIAZINE 1% Cream 1 Application(s) Topical daily  spironolactone 25 milliGRAM(s) Oral daily  vitamin A &amp; D Ointment 1 Application(s) Topical every 6 hours    MEDICATIONS  (PRN):  acetaminophen   Tablet .. 650 milliGRAM(s) Oral every 6 hours PRN Temp greater or equal to 38C (100.4F), Mild Pain (1 - 3)  ondansetron Injectable 4 milliGRAM(s) IV Push every 8 hours PRN Nausea and/or Vomiting  oxyCODONE    IR 5 milliGRAM(s) Oral every 8 hours PRN Severe Pain (7 - 10)    Labs:                        10.8   4.53  )-----------( 92       ( 15 Dec 2018 07:05 )             31.7                         10.4   4.92  )-----------( 168      ( 14 Dec 2018 05:14 )             30.5     15 Dec 2018 07:05    134    |  99     |  11     ----------------------------<  78     4.5     |  24     |  0.7    14 Dec 2018 05:14    132    |  100    |  12     ----------------------------<  72     4.5     |  21     |  0.7      Ca    8.6        15 Dec 2018 07:05  Ca    8.0        14 Dec 2018 05:14  Mg     2.1       15 Dec 2018 07:05  Mg     1.6       14 Dec 2018 05:14              Radiology:    General: comfortable, NAD  Neurology: A&Ox3, nonfocal  Head:  Normocephalic, atraumatic  ENT:  Mucosa moist, no ulcerations  Neck:  Supple, no JVD,   Skin: no breakdown  Resp: CTA B/L  CV: RRR, S1S2,   GI: Soft, NT, bowel sounds  MS: No edema, + peripheral pulses, FROM all 4 extremity

## 2018-12-15 NOTE — PROGRESS NOTE ADULT - ASSESSMENT
64yo male with past medical history of HTN, PAD s/p AKA, and GERD presented with fever and worsening lower extremity cellulitis, transferred from [facility] for right leg angiogram    #) Cellulitis with nonhealing ulcer - improved  - No signs of systemic infection  - Likely 2/2 peripheral arterial disease, arterial duplex shows high grade stenosis on distal superficial femoral artery  - Vascular recs appreciated: no significant disease, no further surgical intervention, outpatient follow up 2 wks with Dr. Young  - No need for antibiotics for now, s/p 10 days of abx  - Continue wound care daily  - Pain control PRN   - PT eval  - Physiatry: SNF vs home with VNS ([facility])    #) Congestive heart failure with unknown EF  - No signs of decompensation, CXR shows cardiomegaly  - Continue Toprol, Lasix and aldactone  - Nephro following    #) Hypertension  - BP well controlled  - Continue Toprol    #) Transaminitis - improved  - US abdominal shows cholelithiasis- Continue to trend LFTs  - Avoid any hepatotoxic drugs    #) GERD  - Continue PPI  DVT ppx: heparin subQ  GI ppx: Protonix  Diet: DASH  Activity: Ambulate as tolerated  Lines: Peripheral IVs  Code status: Full code  Dispo: d/c abhi

## 2018-12-15 NOTE — PROGRESS NOTE ADULT - SUBJECTIVE AND OBJECTIVE BOX
JUDY PEÑALOZA  63y Male   [id number]    Procedure/Diagnosis: RLE angiogram: 3 vessels runoff  Events over 24h: Pt doing well post procedure, laying in bed comfortably. Denies any pain. Able to move RLE. L groin with sterile dressing in place c/d/i, no swelling or hematoma presents. Able to doppler DP/PT.     Patient is a 63y old  Male who presents with a chief complaint of fever and RLE erythema for 1 week (14 Dec 2018 14:09)    PAST MEDICAL & SURGICAL HISTORY:  Circulation disorder of lower extremity: left lower extremity  Edema  GERD (gastroesophageal reflux disease)  Hypertension  S/P BKA (below knee amputation), left    Vital Signs Last 24 Hrs  T(C): 36.6 (14 Dec 2018 23:00), Max: 37 (14 Dec 2018 15:32)  T(F): 97.9 (14 Dec 2018 23:00), Max: 98.6 (14 Dec 2018 15:32)  HR: 90 (15 Dec 2018 05:30) (85 - 97)  BP: 100/50 (15 Dec 2018 05:30) (100/50 - 108/51)  BP(mean): --  RR: 18 (14 Dec 2018 23:00) (18 - 18)  SpO2: --    Diet, Consistent Carbohydrate/No Snacks (12-13-18 @ 10:19)    I&O's Detail    14 Dec 2018 07:01  -  15 Dec 2018 07:00  --------------------------------------------------------  IN:    Oral Fluid: 1300 mL  Total IN: 1300 mL    OUT:    Voided: 3000 mL  Total OUT: 3000 mL    Total NET: -1700 mL    MEDICATIONS  (STANDING):  cefTRIAXone   IVPB 2 Gram(s) IV Intermittent every 24 hours  cefTRIAXone   IVPB      chlorhexidine 4% Liquid 1 Application(s) Topical <User Schedule>  furosemide    Tablet 20 milliGRAM(s) Oral daily  heparin  Injectable 5000 Unit(s) SubCutaneous every 8 hours  metoprolol succinate ER 25 milliGRAM(s) Oral daily  pantoprazole    Tablet 40 milliGRAM(s) Oral before breakfast  silver sulfADIAZINE 1% Cream 1 Application(s) Topical daily  spironolactone 25 milliGRAM(s) Oral daily  vitamin A &amp; D Ointment 1 Application(s) Topical every 6 hours    MEDICATIONS  (PRN):  acetaminophen   Tablet .. 650 milliGRAM(s) Oral every 6 hours PRN Temp greater or equal to 38C (100.4F), Mild Pain (1 - 3)  ondansetron Injectable 4 milliGRAM(s) IV Push every 8 hours PRN Nausea and/or Vomiting  oxyCODONE    IR 5 milliGRAM(s) Oral every 8 hours PRN Severe Pain (7 - 10)    PHYSICAL EXAM:  Physical Examination:  GENERAL: NAD, well-appearing  CHEST/LUNG: no obvious increased wob  EXTREMITIES:  L groin with sterile dressing in place c/d/i, no swelling or hematoma presents. Able to doppler DP/PT.                             10.4   4.92  )-----------( 168      ( 14 Dec 2018 05:14 )             30.5        12-14    132<L>  |  100  |  12  ----------------------------<  72  4.5   |  21  |  0.7    Ca    8.0<L>      14 Dec 2018 05:14  Mg     1.6     12-14                    IMAGING:

## 2018-12-15 NOTE — PROGRESS NOTE ADULT - ASSESSMENT
Assessment:  63y Male patient admitted S/P Diagnostic 3rd-order selective right leg angiogram via US-guided left femoral artery access,    Plan:  -Ambulate as tolerated  -No further surgical intervention at this time  - Abx.   - F/u vascular office in 2 weeks for possible Unna boots.

## 2018-12-16 LAB
ANION GAP SERPL CALC-SCNC: 9 MMOL/L — SIGNIFICANT CHANGE UP (ref 7–14)
BASOPHILS # BLD AUTO: 0.03 K/UL — SIGNIFICANT CHANGE UP (ref 0–0.2)
BASOPHILS NFR BLD AUTO: 0.8 % — SIGNIFICANT CHANGE UP (ref 0–1)
BUN SERPL-MCNC: 10 MG/DL — SIGNIFICANT CHANGE UP (ref 10–20)
CALCIUM SERPL-MCNC: 7.9 MG/DL — LOW (ref 8.5–10.1)
CHLORIDE SERPL-SCNC: 101 MMOL/L — SIGNIFICANT CHANGE UP (ref 98–110)
CO2 SERPL-SCNC: 21 MMOL/L — SIGNIFICANT CHANGE UP (ref 17–32)
CREAT SERPL-MCNC: 0.7 MG/DL — SIGNIFICANT CHANGE UP (ref 0.7–1.5)
EOSINOPHIL # BLD AUTO: 0.02 K/UL — SIGNIFICANT CHANGE UP (ref 0–0.7)
EOSINOPHIL NFR BLD AUTO: 0.6 % — SIGNIFICANT CHANGE UP (ref 0–8)
GLUCOSE SERPL-MCNC: 72 MG/DL — SIGNIFICANT CHANGE UP (ref 70–99)
HCT VFR BLD CALC: 31 % — LOW (ref 42–52)
HGB BLD-MCNC: 10.7 G/DL — LOW (ref 14–18)
IMM GRANULOCYTES NFR BLD AUTO: 0.3 % — SIGNIFICANT CHANGE UP (ref 0.1–0.3)
LYMPHOCYTES # BLD AUTO: 0.96 K/UL — LOW (ref 1.2–3.4)
LYMPHOCYTES # BLD AUTO: 26.8 % — SIGNIFICANT CHANGE UP (ref 20.5–51.1)
MAGNESIUM SERPL-MCNC: 1.7 MG/DL — LOW (ref 1.8–2.4)
MCHC RBC-ENTMCNC: 33.9 PG — HIGH (ref 27–31)
MCHC RBC-ENTMCNC: 34.5 G/DL — SIGNIFICANT CHANGE UP (ref 32–37)
MCV RBC AUTO: 98.1 FL — HIGH (ref 80–94)
MONOCYTES # BLD AUTO: 0.36 K/UL — SIGNIFICANT CHANGE UP (ref 0.1–0.6)
MONOCYTES NFR BLD AUTO: 10.1 % — HIGH (ref 1.7–9.3)
NEUTROPHILS # BLD AUTO: 2.2 K/UL — SIGNIFICANT CHANGE UP (ref 1.4–6.5)
NEUTROPHILS NFR BLD AUTO: 61.4 % — SIGNIFICANT CHANGE UP (ref 42.2–75.2)
NRBC # BLD: 0 /100 WBCS — SIGNIFICANT CHANGE UP (ref 0–0)
PLATELET # BLD AUTO: 73 K/UL — LOW (ref 130–400)
POTASSIUM SERPL-MCNC: 4.4 MMOL/L — SIGNIFICANT CHANGE UP (ref 3.5–5)
POTASSIUM SERPL-SCNC: 4.4 MMOL/L — SIGNIFICANT CHANGE UP (ref 3.5–5)
RBC # BLD: 3.16 M/UL — LOW (ref 4.7–6.1)
RBC # FLD: 12.5 % — SIGNIFICANT CHANGE UP (ref 11.5–14.5)
SODIUM SERPL-SCNC: 131 MMOL/L — LOW (ref 135–146)
WBC # BLD: 3.58 K/UL — LOW (ref 4.8–10.8)
WBC # FLD AUTO: 3.58 K/UL — LOW (ref 4.8–10.8)

## 2018-12-16 RX ORDER — MAGNESIUM SULFATE 500 MG/ML
2 VIAL (ML) INJECTION EVERY 4 HOURS
Qty: 0 | Refills: 0 | Status: COMPLETED | OUTPATIENT
Start: 2018-12-16 | End: 2018-12-17

## 2018-12-16 RX ADMIN — Medication 25 MILLIGRAM(S): at 05:32

## 2018-12-16 RX ADMIN — Medication 650 MILLIGRAM(S): at 21:28

## 2018-12-16 RX ADMIN — Medication 1 APPLICATION(S): at 05:32

## 2018-12-16 RX ADMIN — Medication 20 MILLIGRAM(S): at 05:31

## 2018-12-16 RX ADMIN — HEPARIN SODIUM 5000 UNIT(S): 5000 INJECTION INTRAVENOUS; SUBCUTANEOUS at 13:05

## 2018-12-16 RX ADMIN — Medication 1 APPLICATION(S): at 13:08

## 2018-12-16 RX ADMIN — Medication 650 MILLIGRAM(S): at 05:35

## 2018-12-16 RX ADMIN — Medication 1 APPLICATION(S): at 17:16

## 2018-12-16 RX ADMIN — Medication 25 GRAM(S): at 21:32

## 2018-12-16 RX ADMIN — Medication 650 MILLIGRAM(S): at 20:32

## 2018-12-16 RX ADMIN — SPIRONOLACTONE 25 MILLIGRAM(S): 25 TABLET, FILM COATED ORAL at 05:31

## 2018-12-16 RX ADMIN — Medication 1 APPLICATION(S): at 13:05

## 2018-12-16 RX ADMIN — CEFTRIAXONE 100 GRAM(S): 500 INJECTION, POWDER, FOR SOLUTION INTRAMUSCULAR; INTRAVENOUS at 13:04

## 2018-12-16 RX ADMIN — HEPARIN SODIUM 5000 UNIT(S): 5000 INJECTION INTRAVENOUS; SUBCUTANEOUS at 05:32

## 2018-12-16 RX ADMIN — PANTOPRAZOLE SODIUM 40 MILLIGRAM(S): 20 TABLET, DELAYED RELEASE ORAL at 08:31

## 2018-12-16 RX ADMIN — HEPARIN SODIUM 5000 UNIT(S): 5000 INJECTION INTRAVENOUS; SUBCUTANEOUS at 21:33

## 2018-12-16 NOTE — PROGRESS NOTE ADULT - SUBJECTIVE AND OBJECTIVE BOX
Patient was seen and examined. Spoke with RN. Chart reviewed.  No events overnight.  Vital Signs Last 24 Hrs  T(F): 96.7 (16 Dec 2018 08:00), Max: 98.4 (15 Dec 2018 16:00)  HR: 87 (16 Dec 2018 08:00) (87 - 96)  BP: 118/54 (16 Dec 2018 08:00) (118/54 - 126/59)  SpO2: --  MEDICATIONS  (STANDING):  cefTRIAXone   IVPB 2 Gram(s) IV Intermittent every 24 hours  cefTRIAXone   IVPB      chlorhexidine 4% Liquid 1 Application(s) Topical <User Schedule>  furosemide    Tablet 20 milliGRAM(s) Oral daily  heparin  Injectable 5000 Unit(s) SubCutaneous every 8 hours  metoprolol succinate ER 25 milliGRAM(s) Oral daily  pantoprazole    Tablet 40 milliGRAM(s) Oral before breakfast  silver sulfADIAZINE 1% Cream 1 Application(s) Topical daily  spironolactone 25 milliGRAM(s) Oral daily  vitamin A &amp; D Ointment 1 Application(s) Topical every 6 hours    MEDICATIONS  (PRN):  acetaminophen   Tablet .. 650 milliGRAM(s) Oral every 6 hours PRN Temp greater or equal to 38C (100.4F), Mild Pain (1 - 3)  ondansetron Injectable 4 milliGRAM(s) IV Push every 8 hours PRN Nausea and/or Vomiting  oxyCODONE    IR 5 milliGRAM(s) Oral every 8 hours PRN Severe Pain (7 - 10)    Labs:                        10.7   3.58  )-----------( 73       ( 16 Dec 2018 05:15 )             31.0                         10.8   4.53  )-----------( 92       ( 15 Dec 2018 07:05 )             31.7     16 Dec 2018 05:15    131    |  101    |  10     ----------------------------<  72     4.4     |  21     |  0.7    15 Dec 2018 07:05    134    |  99     |  11     ----------------------------<  78     4.5     |  24     |  0.7      Ca    7.9        16 Dec 2018 05:15  Ca    8.6        15 Dec 2018 07:05  Mg     1.7       16 Dec 2018 05:15  Mg     2.1       15 Dec 2018 07:05            General: comfortable, NAD  Neurology: A&Ox3, nonfocal  Head:  Normocephalic, atraumatic  ENT:  Mucosa moist, no ulcerations  Neck:  Supple, no JVD,   Skin: no breakdowns (as per RN)  Resp: CTA B/L  CV: RRR, S1S2,   GI: Soft, NT, bowel sounds  MS: No edema, + peripheral pulses, L AKA      A/P:  62yo male with past medical history of HTN, PAD s/p AKA, and GERD presented with fever and worsening lower extremity cellulitis, transferred from [facility] for right leg angiogram    #) Cellulitis with nonhealing ulcer - improved  - No signs of systemic infection  - Likely 2/2 peripheral arterial disease, arterial duplex shows high grade stenosis on distal superficial femoral artery  - Vascular recs appreciated: no significant disease, no further surgical intervention, outpatient follow up 2 wks with Dr. Young  - No need for antibiotics for now, s/p 10 days of abx  - Continue wound care daily  - Pain control PRN   - PT eval  - Physiatry: SNF vs home with [facility]NS ([facility])    #) Congestive heart failure with unknown EF  - No signs of decompensation, CXR shows cardiomegaly  - Continue Toprol, Lasix and aldactone  - Nephro following    #) Hypertension  - BP well controlled  - Continue Toprol    #) Transaminitis - improved  - US abdominal shows cholelithiasis- Continue to trend LFTs  - Avoid any hepatotoxic drugs    DC planning    DVT prophylaxis  Decubitus prevention- all measures as per RN protocol  Please call or text me with any questions or updates

## 2018-12-16 NOTE — PROGRESS NOTE ADULT - SUBJECTIVE AND OBJECTIVE BOX
JUDY PEÑALOZA  63y, Male      OVERNIGHT EVENTS:  no acute events overnight    ROS negative except as per above    VITALS:  T(F): 96.7, Max: 98.4 (12-15-18 @ 16:00)  HR: 87  BP: 118/54  RR: 18Vital Signs Last 24 Hrs  T(C): 35.9 (16 Dec 2018 08:00), Max: 36.9 (15 Dec 2018 16:00)  T(F): 96.7 (16 Dec 2018 08:00), Max: 98.4 (15 Dec 2018 16:00)  HR: 87 (16 Dec 2018 08:00) (87 - 96)  BP: 118/54 (16 Dec 2018 08:00) (118/54 - 126/59)  BP(mean): 85 (15 Dec 2018 16:00) (85 - 85)  RR: 18 (16 Dec 2018 08:00) (18 - 18)  SpO2: --    PHYSICAL EXAM  Gen: Awake and alert, non-toxic appearing, NAD  HEENT: NCAT. EOMI. MMM.   Neck: Supple  CV: RRR  Lungs: CTAB  Abd: Soft. NTND  Extr: wwp, RLE with decreased edema, erythema. superficial ulcers   Skin: no rash  Neuro: No focal deficits  Lines: clean    TESTS & MEASUREMENTS:                        10.7   3.58  )-----------( 73       ( 16 Dec 2018 05:15 )             31.0     12-16    131<L>  |  101  |  10  ----------------------------<  72  4.4   |  21  |  0.7    Ca    7.9<L>      16 Dec 2018 05:15  Mg     1.7     12-16              RADIOLOGY & ADDITIONAL TESTS:    ANTIBIOTICS:    cefTRIAXone   IVPB   100 mL/Hr IV Intermittent (12-14-18 @ 15:29)    cefTRIAXone   IVPB   100 mL/Hr IV Intermittent (12-16-18 @ 13:04)   100 mL/Hr IV Intermittent (12-15-18 @ 14:26)    clindamycin IVPB   100 mL/Hr IV Intermittent (12-03-18 @ 12:10)    clindamycin IVPB   100 mL/Hr IV Intermittent (12-05-18 @ 21:50)   100 mL/Hr IV Intermittent (12-05-18 @ 14:55)   100 mL/Hr IV Intermittent (12-05-18 @ 05:22)   100 mL/Hr IV Intermittent (12-04-18 @ 21:18)   100 mL/Hr IV Intermittent (12-04-18 @ 13:48)   100 mL/Hr IV Intermittent (12-04-18 @ 05:07)   100 mL/Hr IV Intermittent (12-03-18 @ 21:14)    linezolid  IVPB   300 mL/Hr IV Intermittent (12-06-18 @ 00:25)    linezolid  IVPB   300 mL/Hr IV Intermittent (12-13-18 @ 06:21)   300 mL/Hr IV Intermittent (12-12-18 @ 17:51)   300 mL/Hr IV Intermittent (12-12-18 @ 05:10)   300 mL/Hr IV Intermittent (12-11-18 @ 18:20)   300 mL/Hr IV Intermittent (12-11-18 @ 08:00)   300 mL/Hr IV Intermittent (12-10-18 @ 22:52)   300 mL/Hr IV Intermittent (12-10-18 @ 05:40)   300 mL/Hr IV Intermittent (12-09-18 @ 17:46)   300 mL/Hr IV Intermittent (12-09-18 @ 05:31)   300 mL/Hr IV Intermittent (12-08-18 @ 17:34)   300 mL/Hr IV Intermittent (12-08-18 @ 06:11)   300 mL/Hr IV Intermittent (12-07-18 @ 17:28)   300 mL/Hr IV Intermittent (12-07-18 @ 06:58)   300 mL/Hr IV Intermittent (12-06-18 @ 17:45)   300 mL/Hr IV Intermittent (12-06-18 @ 06:07)    linezolid  IVPB   300 mL/Hr IV Intermittent (12-13-18 @ 11:43)    linezolid  IVPB   300 mL/Hr IV Intermittent (12-14-18 @ 05:23)    piperacillin/tazobactam IVPB.   25 mL/Hr IV Intermittent (12-13-18 @ 06:21)   25 mL/Hr IV Intermittent (12-12-18 @ 21:37)   25 mL/Hr IV Intermittent (12-12-18 @ 14:33)   25 mL/Hr IV Intermittent (12-12-18 @ 05:10)   25 mL/Hr IV Intermittent (12-11-18 @ 21:28)   25 mL/Hr IV Intermittent (12-11-18 @ 14:16)   25 mL/Hr IV Intermittent (12-11-18 @ 05:31)   25 mL/Hr IV Intermittent (12-10-18 @ 22:52)   25 mL/Hr IV Intermittent (12-10-18 @ 14:19)   25 mL/Hr IV Intermittent (12-10-18 @ 05:40)   25 mL/Hr IV Intermittent (12-09-18 @ 21:21)   25 mL/Hr IV Intermittent (12-09-18 @ 13:55)   25 mL/Hr IV Intermittent (12-09-18 @ 05:31)   25 mL/Hr IV Intermittent (12-08-18 @ 22:29)   25 mL/Hr IV Intermittent (12-08-18 @ 14:25)   25 mL/Hr IV Intermittent (12-08-18 @ 06:16)   25 mL/Hr IV Intermittent (12-07-18 @ 22:01)   25 mL/Hr IV Intermittent (12-07-18 @ 14:46)   25 mL/Hr IV Intermittent (12-07-18 @ 06:58)   25 mL/Hr IV Intermittent (12-06-18 @ 22:42)   25 mL/Hr IV Intermittent (12-06-18 @ 14:31)   25 mL/Hr IV Intermittent (12-06-18 @ 06:07)   25 mL/Hr IV Intermittent (12-05-18 @ 21:49)   25 mL/Hr IV Intermittent (12-05-18 @ 14:55)   25 mL/Hr IV Intermittent (12-05-18 @ 05:22)   25 mL/Hr IV Intermittent (12-04-18 @ 21:18)   25 mL/Hr IV Intermittent (12-04-18 @ 13:48)   25 mL/Hr IV Intermittent (12-04-18 @ 05:07)   25 mL/Hr IV Intermittent (12-03-18 @ 21:18)    piperacillin/tazobactam IVPB.   200 mL/Hr IV Intermittent (12-03-18 @ 12:16)    piperacillin/tazobactam IVPB.   25 mL/Hr IV Intermittent (12-14-18 @ 05:23)   25 mL/Hr IV Intermittent (12-13-18 @ 21:38)   25 mL/Hr IV Intermittent (12-13-18 @ 14:58)        cefTRIAXone   IVPB 2 Gram(s) IV Intermittent every 24 hours  cefTRIAXone   IVPB

## 2018-12-16 NOTE — PROGRESS NOTE ADULT - ASSESSMENT
JUDY PEÑALOZA 63y Male  MRN#: [id number]   CODE STATUS: Full code      SUBJECTIVE  Patient is a 63y old Male who presented with a chief complaint of fever and RLE erythema for 1 week  Currently admitted to medicine with the primary diagnosis of right lower extremity cellulitis 2/2 PAD vs OM  Today is hospital day 13d, and this morning he is resting comfortably in bed and reports no overnight events. Patient's pain is still made worse if he dangles his leg down the bed and improved when he elevates it. Patient denies fever/chills, chest pain, shortness of breath, and abdominal pain, or any urinary symptoms.    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  Edema  GERD (gastroesophageal reflux disease)  Hypertension  S/P BKA (below knee amputation), left    ALLERGIES:  No Known Allergies    MEDICATIONS:  STANDING MEDICATIONS  cefTRIAXone   IVPB 2 Gram(s) IV Intermittent every 24 hours  cefTRIAXone   IVPB      chlorhexidine 4% Liquid 1 Application(s) Topical <User Schedule>  furosemide    Tablet 20 milliGRAM(s) Oral daily  heparin  Injectable 5000 Unit(s) SubCutaneous every 8 hours  metoprolol succinate ER 25 milliGRAM(s) Oral daily  pantoprazole    Tablet 40 milliGRAM(s) Oral before breakfast  silver sulfADIAZINE 1% Cream 1 Application(s) Topical daily  spironolactone 25 milliGRAM(s) Oral daily  vitamin A &amp; D Ointment 1 Application(s) Topical every 6 hours    PRN MEDICATIONS  acetaminophen   Tablet .. 650 milliGRAM(s) Oral every 6 hours PRN  ondansetron Injectable 4 milliGRAM(s) IV Push every 8 hours PRN  oxyCODONE    IR 5 milliGRAM(s) Oral every 8 hours PRN      VITAL SIGNS: Last 24 Hours  T(C): 36.7 (16 Dec 2018 16:14), Max: 36.7 (16 Dec 2018 16:14)  T(F): 98.1 (16 Dec 2018 16:14), Max: 98.1 (16 Dec 2018 16:14)  HR: 94 (16 Dec 2018 16:14) (87 - 94)  BP: 120/58 (16 Dec 2018 16:14) (118/54 - 120/58)  BP(mean): --  RR: 18 (16 Dec 2018 16:14) (18 - 18)  SpO2: 96% (16 Dec 2018 16:14) (96% - 96%)    LABS:                        10.7   3.58  )-----------( 73       ( 16 Dec 2018 05:15 )             31.0     12-16    131<L>  |  101  |  10  ----------------------------<  72  4.4   |  21  |  0.7    Ca    7.9<L>      16 Dec 2018 05:15  Mg     1.7     12-16    RADIOLOGY:      PHYSICAL EXAM:  GENERAL: NAD, well-developed, AAOx3  HEENT:  Atraumatic, Normocephalic. Conjunctiva and sclera clear, No JVD  PULMONARY: Clear to auscultation bilaterally;  CARDIOVASCULAR: Regular rate and rhythm; No murmurs  GASTROINTESTINAL: Obese abdomen, soft, nontender, nondistended; Bowel sounds present  MUSCULOSKELETAL:  2+ pulse on RLE, erythema, and swelling right calf, with multiple medial ulcers, with clean dressing on top, no tenderness  NEUROLOGY: non-focal  SKIN: No rashes or lesions      ADMISSION SUMMARY  Patient is a 63y old Male who presented with a chief complaint of fever and RLE erythema for 1 week  Currently admitted to medicine with the primary diagnosis of right lower extremity cellulitis and ulcer 2/2 PAD      ASSESSMENT & PLAN  64yo male with past medical history of HTN, PAD s/p AKA, and GERD presented with fever and worsening lower extremity cellulitis, transferred from [facility] for right leg angiogram    #) Cellulitis with nonhealing ulcer - improved  - No signs of systemic infection  - Likely 2/2 peripheral arterial disease, arterial duplex shows high grade stenosis on distal superficial femoral artery  - Vascular recs appreciated: no significant disease, no further surgical intervention, outpatient follow up 2 wks with Dr. Young  - CT in 12/3 shows medial tibial periostitis worse at the distal third consistent with osteitis, concern for underlying OM, ID recommends MRI RLE and Ceftriaxone 2g Q24hrs  - Pending MRI RLE, will be done tonight  - ESR/CRP elevated  - Continue wound care daily  - Pain control PRN   - Continue PT eval  - Physiatry: SNF vs home with VNS ([facility]    #) Congestive heart failure with unknown EF  - No signs of decompensation, CXR shows cardiomegaly  - Continue Toprol, Lasix and aldactone  - Nephro following    #) Hypertension  - BP well controlled  - Continue Toprol    #) Transaminitis - improved  - US abdominal shows cholelithiasis  - Continue to trend LFTs, trending down  - Avoid any hepatotoxic drugs    #) GERD  - Continue PPI    #) Leukopenia  - Etiology unclear  - Patient is not septic appearing  - Continue to trend daily    #) Hypomagnesemia  - Mg 1.7, will give 2g IV Mg x 2 doses  - Recheck AM      DVT ppx: heparin subQ  GI ppx: Protonix  Diet: DASH  Activity: Ambulate as tolerated  Lines: Peripheral IVs  Code status: Full code  Dispo: follow up ID recs s/p MRI, plan explained to patient and his family thoroughly

## 2018-12-16 NOTE — PROGRESS NOTE ADULT - ASSESSMENT
63M    Hypertension  S/P BKA left    Admitted with Severe Sepsis   RLE cellulitis/lymphangitis  12/3 bcx NGTD  CT LE No CT evidence of necrotizing fasciitis. Diffuse cellulitis from mid third of leg to foot with soft tissue ulcerations along medial lateral distal leg. Medial tibial periostitis worst at the distal third consistent with osteitis  Arterial Duplex High-grade stenosis in the distal superficial femoral artery  ESR/CRP elevated    - Given Medial tibial periostitis worst at the distal third consistent with osteitis and elevated inflamm markers, would obtain RLE MRI to r/o osteomyelitis as would change treatment course  - Ceftriaxone 2g q24h  - Vascular following    Spectra [id number]

## 2018-12-17 PROBLEM — I99.9 UNSPECIFIED DISORDER OF CIRCULATORY SYSTEM: Chronic | Status: ACTIVE | Noted: 2018-12-03

## 2018-12-17 PROBLEM — K21.9 GASTRO-ESOPHAGEAL REFLUX DISEASE WITHOUT ESOPHAGITIS: Chronic | Status: ACTIVE | Noted: 2018-12-03

## 2018-12-17 PROBLEM — I10 ESSENTIAL (PRIMARY) HYPERTENSION: Chronic | Status: ACTIVE | Noted: 2018-12-03

## 2018-12-17 PROBLEM — R60.9 EDEMA, UNSPECIFIED: Chronic | Status: ACTIVE | Noted: 2018-12-03

## 2018-12-17 LAB
ALBUMIN SERPL ELPH-MCNC: 2.3 G/DL — LOW (ref 3.5–5.2)
ALP SERPL-CCNC: 159 U/L — HIGH (ref 30–115)
ALT FLD-CCNC: 47 U/L — HIGH (ref 0–41)
ANION GAP SERPL CALC-SCNC: 11 MMOL/L — SIGNIFICANT CHANGE UP (ref 7–14)
AST SERPL-CCNC: 57 U/L — HIGH (ref 0–41)
BASOPHILS # BLD AUTO: 0.02 K/UL — SIGNIFICANT CHANGE UP (ref 0–0.2)
BASOPHILS NFR BLD AUTO: 0.5 % — SIGNIFICANT CHANGE UP (ref 0–1)
BILIRUB DIRECT SERPL-MCNC: 0.4 MG/DL — HIGH (ref 0–0.2)
BILIRUB INDIRECT FLD-MCNC: 0.7 MG/DL — SIGNIFICANT CHANGE UP (ref 0.2–1.2)
BILIRUB SERPL-MCNC: 1.1 MG/DL — SIGNIFICANT CHANGE UP (ref 0.2–1.2)
BUN SERPL-MCNC: 10 MG/DL — SIGNIFICANT CHANGE UP (ref 10–20)
CALCIUM SERPL-MCNC: 8.1 MG/DL — LOW (ref 8.5–10.1)
CHLORIDE SERPL-SCNC: 98 MMOL/L — SIGNIFICANT CHANGE UP (ref 98–110)
CO2 SERPL-SCNC: 22 MMOL/L — SIGNIFICANT CHANGE UP (ref 17–32)
CREAT SERPL-MCNC: 0.7 MG/DL — SIGNIFICANT CHANGE UP (ref 0.7–1.5)
EOSINOPHIL # BLD AUTO: 0.02 K/UL — SIGNIFICANT CHANGE UP (ref 0–0.7)
EOSINOPHIL NFR BLD AUTO: 0.5 % — SIGNIFICANT CHANGE UP (ref 0–8)
GLUCOSE SERPL-MCNC: 80 MG/DL — SIGNIFICANT CHANGE UP (ref 70–99)
HCT VFR BLD CALC: 30 % — LOW (ref 42–52)
HGB BLD-MCNC: 10.4 G/DL — LOW (ref 14–18)
IMM GRANULOCYTES NFR BLD AUTO: 0.3 % — SIGNIFICANT CHANGE UP (ref 0.1–0.3)
LYMPHOCYTES # BLD AUTO: 1.18 K/UL — LOW (ref 1.2–3.4)
LYMPHOCYTES # BLD AUTO: 29.6 % — SIGNIFICANT CHANGE UP (ref 20.5–51.1)
MAGNESIUM SERPL-MCNC: 2.2 MG/DL — SIGNIFICANT CHANGE UP (ref 1.8–2.4)
MCHC RBC-ENTMCNC: 34 PG — HIGH (ref 27–31)
MCHC RBC-ENTMCNC: 34.7 G/DL — SIGNIFICANT CHANGE UP (ref 32–37)
MCV RBC AUTO: 98 FL — HIGH (ref 80–94)
MONOCYTES # BLD AUTO: 0.5 K/UL — SIGNIFICANT CHANGE UP (ref 0.1–0.6)
MONOCYTES NFR BLD AUTO: 12.5 % — HIGH (ref 1.7–9.3)
NEUTROPHILS # BLD AUTO: 2.26 K/UL — SIGNIFICANT CHANGE UP (ref 1.4–6.5)
NEUTROPHILS NFR BLD AUTO: 56.6 % — SIGNIFICANT CHANGE UP (ref 42.2–75.2)
NRBC # BLD: 0 /100 WBCS — SIGNIFICANT CHANGE UP (ref 0–0)
PLATELET # BLD AUTO: 70 K/UL — LOW (ref 130–400)
POTASSIUM SERPL-MCNC: 4.1 MMOL/L — SIGNIFICANT CHANGE UP (ref 3.5–5)
POTASSIUM SERPL-SCNC: 4.1 MMOL/L — SIGNIFICANT CHANGE UP (ref 3.5–5)
PROT SERPL-MCNC: 7.5 G/DL — SIGNIFICANT CHANGE UP (ref 6–8)
RBC # BLD: 3.06 M/UL — LOW (ref 4.7–6.1)
RBC # FLD: 12.4 % — SIGNIFICANT CHANGE UP (ref 11.5–14.5)
SODIUM SERPL-SCNC: 131 MMOL/L — LOW (ref 135–146)
WBC # BLD: 3.99 K/UL — LOW (ref 4.8–10.8)
WBC # FLD AUTO: 3.99 K/UL — LOW (ref 4.8–10.8)

## 2018-12-17 RX ADMIN — Medication 20 MILLIGRAM(S): at 05:23

## 2018-12-17 RX ADMIN — Medication 650 MILLIGRAM(S): at 11:42

## 2018-12-17 RX ADMIN — Medication 1 APPLICATION(S): at 00:02

## 2018-12-17 RX ADMIN — Medication 1 APPLICATION(S): at 11:41

## 2018-12-17 RX ADMIN — Medication 1 APPLICATION(S): at 11:44

## 2018-12-17 RX ADMIN — HEPARIN SODIUM 5000 UNIT(S): 5000 INJECTION INTRAVENOUS; SUBCUTANEOUS at 21:53

## 2018-12-17 RX ADMIN — CEFTRIAXONE 100 GRAM(S): 500 INJECTION, POWDER, FOR SOLUTION INTRAMUSCULAR; INTRAVENOUS at 14:16

## 2018-12-17 RX ADMIN — Medication 650 MILLIGRAM(S): at 12:00

## 2018-12-17 RX ADMIN — SPIRONOLACTONE 25 MILLIGRAM(S): 25 TABLET, FILM COATED ORAL at 05:23

## 2018-12-17 RX ADMIN — HEPARIN SODIUM 5000 UNIT(S): 5000 INJECTION INTRAVENOUS; SUBCUTANEOUS at 05:22

## 2018-12-17 RX ADMIN — Medication 25 MILLIGRAM(S): at 05:23

## 2018-12-17 RX ADMIN — Medication 25 GRAM(S): at 01:37

## 2018-12-17 RX ADMIN — Medication 1 APPLICATION(S): at 17:49

## 2018-12-17 RX ADMIN — HEPARIN SODIUM 5000 UNIT(S): 5000 INJECTION INTRAVENOUS; SUBCUTANEOUS at 14:17

## 2018-12-17 RX ADMIN — Medication 650 MILLIGRAM(S): at 17:49

## 2018-12-17 RX ADMIN — PANTOPRAZOLE SODIUM 40 MILLIGRAM(S): 20 TABLET, DELAYED RELEASE ORAL at 08:24

## 2018-12-17 NOTE — PROGRESS NOTE ADULT - SUBJECTIVE AND OBJECTIVE BOX
Patient was seen and examined. Spoke with RN. Chart reviewed.  No events overnight.  Vital Signs Last 24 Hrs  T(F): 96.7 (17 Dec 2018 00:00), Max: 98.1 (16 Dec 2018 16:14)  HR: 91 (17 Dec 2018 00:00) (87 - 94)  BP: 105/59 (17 Dec 2018 00:00) (105/59 - 120/58)  SpO2: 96% (16 Dec 2018 16:14) (96% - 96%)  MEDICATIONS  (STANDING):  cefTRIAXone   IVPB 2 Gram(s) IV Intermittent every 24 hours  cefTRIAXone   IVPB      chlorhexidine 4% Liquid 1 Application(s) Topical <User Schedule>  furosemide    Tablet 20 milliGRAM(s) Oral daily  heparin  Injectable 5000 Unit(s) SubCutaneous every 8 hours  metoprolol succinate ER 25 milliGRAM(s) Oral daily  pantoprazole    Tablet 40 milliGRAM(s) Oral before breakfast  silver sulfADIAZINE 1% Cream 1 Application(s) Topical daily  spironolactone 25 milliGRAM(s) Oral daily  vitamin A &amp; D Ointment 1 Application(s) Topical every 6 hours    MEDICATIONS  (PRN):  acetaminophen   Tablet .. 650 milliGRAM(s) Oral every 6 hours PRN Temp greater or equal to 38C (100.4F), Mild Pain (1 - 3)  ondansetron Injectable 4 milliGRAM(s) IV Push every 8 hours PRN Nausea and/or Vomiting  oxyCODONE    IR 5 milliGRAM(s) Oral every 8 hours PRN Severe Pain (7 - 10)    Labs:                        10.4   3.99  )-----------( 70       ( 17 Dec 2018 05:36 )             30.0                         10.7   3.58  )-----------( 73       ( 16 Dec 2018 05:15 )             31.0     17 Dec 2018 05:36    131    |  98     |  10     ----------------------------<  80     4.1     |  22     |  0.7    16 Dec 2018 05:15    131    |  101    |  10     ----------------------------<  72     4.4     |  21     |  0.7      Ca    8.1        17 Dec 2018 05:36  Ca    7.9        16 Dec 2018 05:15  Mg     2.2       17 Dec 2018 05:36  Mg     1.7       16 Dec 2018 05:15            General: comfortable, NAD  Neurology: A&Ox3, nonfocal  Head:  Normocephalic, atraumatic  ENT:  Mucosa moist, no ulcerations  Neck:  Supple, no JVD,   Skin: no breakdowns (as per RN)  Resp: CTA B/L  CV: RRR, S1S2,   GI: Soft, NT, bowel sounds  MS: No edema, + peripheral pulses, LLE amputation; RLE bandaged      A/P:  62yo male with past medical history of HTN, PAD s/p AKA, and GERD presented with fever and worsening lower extremity cellulitis, transferred from [facility] for right leg angiogram    #) Cellulitis with nonhealing ulcer - improved  - No signs of systemic infection  - Likely 2/2 chronic venous stasis, arterial duplex shows high grade stenosis on distal superficial femoral artery  - Vascular recs appreciated: no significant disease, no further surgical intervention, outpatient follow up 2 wks with Dr. Young  - CT in 12/3 shows medial tibial periostitis worse at the distal third consistent with osteitis, concern for underlying OM, ID recommends MRI RLE and Ceftriaxone 2g IV Q24hrs  - Pending MRI RLE result  - ESR >140 /CRP 8.86  - Continue wound care daily  - Pain control PRN   - Continue PT eval  - Physiatry: SNF vs home with VNS ([facility])      Dispo: follow up ID recs s/p MRI, DC planning when cleared by ID     DVT prophylaxis  Decubitus prevention- all measures as per RN protocol  Please call or text me with any questions or updates

## 2018-12-17 NOTE — CONSULT NOTE ADULT - SUBJECTIVE AND OBJECTIVE BOX
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 Patient has chronic leg wound and had worsening pain starting yesterday. 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  Edema  GERD (gastroesophageal reflux disease)  Hypertension  S/P BKA (below knee amputation), left      Hospital Course:    TODAY'S SUBJECTIVE & REVIEW OF SYMPTOMS:     Constitutional WNL   Cardio WNL   Resp WNL   GI WNL  Heme WNL  Endo WNL  Skin WNL  MSK WNL  Neuro WNL  Cognitive WNL  Psych WNL      MEDICATIONS  (STANDING):  chlorhexidine 4% Liquid 1 Application(s) Topical <User Schedule>  furosemide    Tablet 20 milliGRAM(s) Oral daily  heparin  Injectable 5000 Unit(s) SubCutaneous every 8 hours  linezolid  IVPB 600 milliGRAM(s) IV Intermittent every 12 hours  linezolid  IVPB      metoprolol succinate ER 25 milliGRAM(s) Oral daily  pantoprazole    Tablet 40 milliGRAM(s) Oral before breakfast  piperacillin/tazobactam IVPB. 3.375 Gram(s) IV Intermittent every 8 hours  potassium chloride    Tablet ER 40 milliEquivalent(s) Oral every 4 hours  silver sulfADIAZINE 1% Cream 1 Application(s) Topical daily  spironolactone 25 milliGRAM(s) Oral daily    MEDICATIONS  (PRN):  acetaminophen   Tablet .. 650 milliGRAM(s) Oral every 6 hours PRN Temp greater or equal to 38C (100.4F), Mild Pain (1 - 3)  ondansetron Injectable 4 milliGRAM(s) IV Push every 8 hours PRN Nausea and/or Vomiting      FAMILY HISTORY:      Allergies    No Known Allergies    Intolerances        SOCIAL HISTORY:    [    ] Etoh  [    ] Smoking  [    ] Substance abuse     Home Environment:  [    ] Home Alone  [    ] Lives with Family  [    ] Home Health Aid    Dwelling:  [    ] Apartment  [    ] Private House  [   x ] Adult Home  [    ] Skilled Nursing Facility      [    ] Short Term  [    ] Long Term  [    ] Stairs                           [    ] Elevator   MRA  FUNCTIONAL STATUS PTA: (Check all that apply)  Ambulation: [    x ]Independent    [    ] Dependent     [    ] Non-Ambulatory  Assistive Device: [  x  ] SA Cane  [    ]  Q Cane  [    ] Walker  [    ]  Wheelchair  ADL : [   x ] Independent  [    ]  Dependent   USES L AK PROSTHESIS    Vital Signs Last 24 Hrs  T(C): 37 (11 Dec 2018 07:57), Max: 37.3 (10 Dec 2018 18:30)  T(F): 98.6 (11 Dec 2018 07:57), Max: 99.1 (10 Dec 2018 18:30)  HR: 97 (11 Dec 2018 07:57) (90 - 107)  BP: 143/62 (11 Dec 2018 07:57) (114/58 - 143/62)  BP(mean): --  RR: 18 (11 Dec 2018 07:57) (16 - 18)  SpO2: --      PHYSICAL EXAM: Alert & Oriented X3  GENERAL: NAD, well-groomed, well-developed  HEAD:  Atraumatic, Normocephalic  EYES: EOMI, PERRLA, conjunctiva and sclera clear  NECK: Supple, No JVD, Normal thyroid  CHEST/LUNG: Clear bilaterally;  HEART: Regular rate and rhythm; No murmurs, rubs, or gallops  ABDOMEN: Soft, Nontender, Nondistended; Bowel sounds present  EXTREMITIES: RLE ischemic, edematous,ecchymotic- L AKA Intact    NERVOUS SYSTEM:  Cranial Nerves 2-12 intact [   x ] Abnormal  [    ]  ROM: WFL all extremities [    ]  Abnormal [    x ]L AKA , RLE diminished ROM all joints secondary to pain  Motor Strength: WFL all extremities  [    ]  Abnormal [   x ]4/5 all ext except RLE<2/5 secondary to pain   Sensation: intact to light touch [    ] Abnormal [ x   ]diminished RLE      FUNCTIONAL STATUS:  Bed Mobility: [   ]  Independent [ x   ]  Supervision [    ]  Needs Assistance [  ]  N/A  Transfers: [    ]  Independent [    ]  Supervision [    ]  Needs Assistance [  x  ]  N/A    Ambulation:  [    ]  Independent [    ]  Supervision [    ]  Needs Assistance [   x ]  N/A   ADL:  [    ]   Independent [    ] Requires Assistance [   x ] N/A       LABS:                        12.5   8.91  )-----------( 147      ( 09 Dec 2018 18:56 )             35.6     12-09    134<L>  |  105  |  8<L>  ----------------------------<  110<H>  4.7   |  20  |  0.8    Ca    7.8<L>      09 Dec 2018 18:56            RADIOLOGY & ADDITIONAL STUDIES:
HPI:  Patient is a 62yo M with a pmhx as below, including PAD, AKA on left, right le ulcers whom admitted for right lower extremity cellulitis.    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 3.375 Gram(s) IV Intermittent every 8 hours  potassium chloride    Tablet ER 40 milliEquivalent(s) Oral every 4 hours  silver sulfADIAZINE 1% Cream 1 Application(s) Topical daily  sodium chloride 0.9%. 1000 milliLiter(s) (50 mL/Hr) IV Continuous <Continuous>  spironolactone 25 milliGRAM(s) Oral daily    MEDICATIONS  (PRN):  acetaminophen   Tablet .. 650 milliGRAM(s) Oral every 6 hours PRN Temp greater or equal to 38C (100.4F), Mild Pain (1 - 3)  morphine  - Injectable 1 milliGRAM(s) IV Push every 8 hours PRN Severe Pain (7 - 10)  ondansetron Injectable 4 milliGRAM(s) IV Push every 8 hours PRN Nausea and/or Vomiting      General:	no fever, weight loss,  chills  Skin: AS ABOVE  Ophthalmologic: no visual changes  ENMT:	no sore throat  Respiratory and Thorax: no cough, wheeze,  sob  Cardiovascular:	no chest pain, palpitations, dizziness  Gastrointestinal:	no nausea, vomiting, diarrhea, abd pain  Genitourinary:	no dysuria, hematuria  Musculoskeletal:	 LEFT AKA  Neurological:	 no speech disturbance, focal weakness, numbness  Psychiatric:	no depression, anxiety, psychosis  Hematology/Lymphatics:	no anemia  Endocrine:	no polyuria, polydipsia        Vital Signs Last 24 Hrs  T(F): 97.1 (09 Dec 2018 14:00), Max: 97.9 (08 Dec 2018 22:02)  HR: 91 (09 Dec 2018 14:00) (91 - 99)  BP: 113/53 (09 Dec 2018 14:00) (113/53 - 121/58)  RR: 16 (09 Dec 2018 14:00) (16 - 16)      PHYSICAL EXAM:      Constitutional: A&Ox4  Respiratory: cta b/l  Cardiovascular: s1 s2 rrr  Gastrointestinal: soft nt  nd + bs no rebound or guarding  Genitourinary: no cva tenderness  Extremities: +LEFT AKA, RIGHT LE EDEMA, ERYTHEMA,   Neurological: no focal deficits  VASCULAR--PULSES RIGHT FEMORAL 2+ DP/PT--TRACE  Skin: SEVERAL ULCERS TO DISTAL RLE, NO CYANOSIS      CULTURE RESULTS:                12-03-18 @ 12:32  Specimen Source: --  Method Type: --  Gram Stain - RRL: --  Gram Stain - Wound: --  Bacteria: --  Culture Results:   No growth at 5 days.      Specimen Source:   Method Type:   Gram Stain:   Culture Results: Culture Results:   No growth at 5 days. (12-03-18 @ 12:32)  Culture Results:   No growth at 5 days. (12-03-18 @ 12:32)    < from: CT Lower Extremity No Cont, Right (12.03.18 @ 14:39) >  Impression:  1. No CT evidence of necrotizing fasciitis  2. Diffuse cellulitis from mid third of leg to foot with soft tissue   ulcerations along medial lateral distal leg  3. Medial tibial periostitis worst at the distal third consistent with   osteitis
JUDY PEÑALOZA 63yMalePatient is a 63y old  Male who presents with a chief complaint of fever and RLE erythema for 1 week (03 Dec 2018 16:53)      Patient has history of:  No Known Allergies      Adult/Nursing Home residence    CELLULITIS  ^EDEMA RIGHT LEG  Handoff  MEWS Score  Circulation disorder of lower extremity  Edema  GERD (gastroesophageal reflux disease)  Hypertension  Cellulitis  S/P BKA (below knee amputation), left  EDEMA RIGHT LEG  Sepsis        Patient treated with:  clindamycin IVPB 300 milliGRAM(s) IV Intermittent every 8 hours  piperacillin/tazobactam IVPB. 3.375 Gram(s) IV Intermittent every 8 hours        PHYSICAL EXAM  T(F): 97.1 (12-04-18 @ 05:16), Max: 100.9 (12-03-18 @ 10:36)  HR: 91 (12-04-18 @ 05:16) (87 - 110)  BP: 116/58 (12-04-18 @ 05:16) (112/57 - 146/66)  RR: 18 (12-04-18 @ 05:16) (18 - 22)  SpO2: 98% (12-03-18 @ 17:16) (95% - 98%)  Daily Height in cm: 162.56 (03 Dec 2018 18:12)    Daily   HEENT: normal, no nuchal rigidity  Cor: RSR Nl S1 S2  Lungs: clear  Decreased breath sounds at bases    Abdomen: Nontender, Nl BS,     Ext: L BKA; RLE erythema, ulcers    LAB & RADIOLOGIC RESULTS:                        15.3   15.83 )-----------( 105      ( 03 Dec 2018 12:32 )             42.2         12-03    129<L>  |  93<L>  |  16  ----------------------------<  94  3.4<L>   |  19  |  1.0      TPro  6.7  /  Alb  3.1<L>  /  TBili  3.0<H>  /  DBili  x   /  AST  100<H>  /  ALT  59<H>  /  AlkPhos  116<H>  12-03    <--<9>>2.4    Sodium, Serum: 129 mmol/L (12-03-18 @ 12:32)  Blood Gas Venous - Sodium: 130 mmoL/L (12-03-18 @ 12:20)    Hyponatremia             Lactic Acidosis   Lactate, Blood: 2.0 mmol/L (12-03-18 @ 21:40)  Blood Gas Venous - Lactate: 2.4 mmoL/L (12-03-18 @ 12:20)    Acidosis
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 Patient has chronic leg wound and had worsening pain starting yesterday. Patient denies chest pain, SOB, abdominal pain, nausea, vomiting, diarrhea, dizziness, weakness, changes in PO intake, changes in urine output, changes in mental status  subjective fever + at home. Was admitted to medical service and worked up, s/p angiogram on 12/13 by vascular showing no signs of peripheral vascular disease, recommended continued local wound care. MRI performed this morning showed RLE fluid collections concerning for abscess    Bulgarian speaking -  [id number] bridgette used    PAST MEDICAL & SURGICAL HISTORY:  Circulation disorder of lower extremity: left lower extremity  Edema  GERD (gastroesophageal reflux disease)  Hypertension  S/P BKA (below knee amputation), left        MEDICATIONS  (STANDING):  cefTRIAXone   IVPB 2 Gram(s) IV Intermittent every 24 hours  cefTRIAXone   IVPB      chlorhexidine 4% Liquid 1 Application(s) Topical <User Schedule>  furosemide    Tablet 20 milliGRAM(s) Oral daily  heparin  Injectable 5000 Unit(s) SubCutaneous every 8 hours  metoprolol succinate ER 25 milliGRAM(s) Oral daily  pantoprazole    Tablet 40 milliGRAM(s) Oral before breakfast  silver sulfADIAZINE 1% Cream 1 Application(s) Topical daily  spironolactone 25 milliGRAM(s) Oral daily  vitamin A &amp; D Ointment 1 Application(s) Topical every 6 hours    MEDICATIONS  (PRN):  acetaminophen   Tablet .. 650 milliGRAM(s) Oral every 6 hours PRN Temp greater or equal to 38C (100.4F), Mild Pain (1 - 3)  ondansetron Injectable 4 milliGRAM(s) IV Push every 8 hours PRN Nausea and/or Vomiting  oxyCODONE    IR 5 milliGRAM(s) Oral every 8 hours PRN Severe Pain (7 - 10)      Allergies    No Known Allergies    Intolerances        REVIEW OF SYSTEMS    [X] A ten-point review of systems was otherwise negative except as noted.  [ ] Due to altered mental status/intubation, subjective information were not able to be obtained from the patient. History was obtained, to the extent possible, from review of the chart and collateral sources of information.      Vital Signs Last 24 Hrs  T(C): 36.7 (17 Dec 2018 16:00), Max: 36.7 (17 Dec 2018 16:00)  T(F): 98 (17 Dec 2018 16:00), Max: 98 (17 Dec 2018 16:00)  HR: 88 (17 Dec 2018 16:00) (88 - 91)  BP: 125/60 (17 Dec 2018 16:00) (105/59 - 125/60)  BP(mean): --  RR: 18 (17 Dec 2018 16:00) (18 - 18)  SpO2: --    PHYSICAL EXAM:  GENERAL: NAD, well-appearing  CHEST/LUNG: Clear to auscultation bilaterally  HEART: Regular rate and rhythm  ABDOMEN: Soft, Nontender, Nondistended;   EXTREMITIES:  s/p left BKA, rle intially in ace, upon unwrapping diffuse skin changes, plapable area on medial calf corresponding to abscess cavity on MRI      LABS:  Labs:  CAPILLARY BLOOD GLUCOSE                              10.4   3.99  )-----------( 70       ( 17 Dec 2018 05:36 )             30.0       Auto Neutrophil %: 56.6 % (12-17-18 @ 05:36)  Auto Immature Granulocyte %: 0.3 % (12-17-18 @ 05:36)    12-17    131<L>  |  98  |  10  ----------------------------<  80  4.1   |  22  |  0.7      Calcium, Total Serum: 8.1 mg/dL (12-17-18 @ 05:36)      LFTs:             7.5  | 1.1  | 57       ------------------[159     ( 17 Dec 2018 13:50 )  2.3  | 0.4  | 47            RADIOLOGY & ADDITIONAL STUDIES:  < from: MR Lower Ext Non-joint No Cont, Right (12.16.18 @ 23:03) >  1. Subcutaneous abscesses as above.  2. No evidence for osteomyelitis.  3. Focal stress related edema within the mid/distal fibula without   discrete fracture.    < end of copied text >
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 Patient has chronic leg wound and had worsening pain starting yesterday. Patient denies chest pain, SOB, abdominal pain, nausea, vomiting, diarrhea, dizziness, weakness, changes in PO intake, changes in urine output, changes in mental status  subjective fever + at home.    Renal consulted for hyponatremia on admission    PAST MEDICAL & SURGICAL HISTORY:  Circulation disorder of lower extremity: left lower extremity  Edema  GERD (gastroesophageal reflux disease)  Hypertension  S/P BKA (below knee amputation), left    Allergies:  No Known Allergies    Home Medications Reviewed    SOCIAL HISTORY:  Denies ETOH,Smoking,   FAMILY HISTORY:        REVIEW OF SYSTEMS:    All other review of systems is negative unless indicated above.    PHYSICAL EXAM:  NAD  awake and alert  moist mm  no jvd  cta bl  rrr  soft  left aka  right le erythematous  + PP rle  no cvat    Hospital Medications:   MEDICATIONS  (STANDING):  chlorhexidine 4% Liquid 1 Application(s) Topical <User Schedule>  furosemide    Tablet 60 milliGRAM(s) Oral daily  heparin  Injectable 5000 Unit(s) SubCutaneous every 8 hours  linezolid  IVPB 600 milliGRAM(s) IV Intermittent every 12 hours  linezolid  IVPB      metoprolol succinate ER 25 milliGRAM(s) Oral daily  pantoprazole    Tablet 40 milliGRAM(s) Oral before breakfast  piperacillin/tazobactam IVPB. 3.375 Gram(s) IV Intermittent every 8 hours  potassium chloride    Tablet ER 40 milliEquivalent(s) Oral every 4 hours  silver sulfADIAZINE 1% Cream 1 Application(s) Topical daily  sodium chloride 0.9%. 1000 milliLiter(s) (50 mL/Hr) IV Continuous <Continuous>  spironolactone 25 milliGRAM(s) Oral daily        VITALS:  T(F): 99.2 (12-06-18 @ 06:00), Max: 99.3 (12-05-18 @ 14:21)  HR: 97 (12-06-18 @ 06:00)  BP: 100/50 (12-06-18 @ 06:00)  RR: 16 (12-06-18 @ 06:00)  SpO2: --  Wt(kg): --    12-04 @ 07:01  -  12-05 @ 07:00  --------------------------------------------------------  IN: 0 mL / OUT: 1600 mL / NET: -1600 mL    12-05 @ 07:01  -  12-06 @ 07:00  --------------------------------------------------------  IN: 0 mL / OUT: 2650 mL / NET: -2650 mL      Height (cm): 162.56 (12-05 @ 15:19)  Weight (kg): 98.5 (12-05 @ 15:19)  BMI (kg/m2): 37.3 (12-05 @ 15:19)  BSA (m2): 2.03 (12-05 @ 15:19)    LABS:            Urine Studies:        RADIOLOGY & ADDITIONAL STUDIES:

## 2018-12-17 NOTE — CONSULT NOTE ADULT - ASSESSMENT
IMPRESSION: Rehab of L AKA, Cellulitis/ ischemia RLE    PRECAUTIONS: [    ] Cardiac  [    ] Respiratory  [    ] Seizures [    ] Contact Isolation  [    ] Droplet Isolation  [    ] Other    Weight Bearing Status:     RECOMMENDATION: SEVERE PAIN RLE- AWAITING ANGIO/ REVASC? ON FRIDAY    Out of Bed to Chair                                NEEDS PROSTHESIS AT [facility]                                                                RECONSULT POST OP OR WHEN PAIN LESS RLE  DVT/Decubiti Prophylaxis    REHAB PLAN:     [     ] Bedside P/T 3-5 times a week   [     ] Bedside O/T  2-3 times a week   [   x  ] No Rehab Therapy Indicated   [     ]  Speech Therapy   Conditioning/ROM                                 ADL  Bed Mobility                                            Conditioning/ROM  Transfers                                                  Bed Mobility  Sitting /Standing Balance                      Transfers                                        Gait Training                                            Sitting/Standing Balance  Stair Training [   ]Applicable                 Home equipment Eval                                                                     Splinting  [   ] Only      GOALS:   ADL   [    ]   Independent         Transfers  [    ] Independent            Ambulation  [     ] Independent     [     ] With device                            [    ]  CG                                               [    ]  CG                                                    [     ] CG                            [    ] Min A                                          [    ] Min A                                                [     ] Min  A          DISCHARGE PLAN:   [     ]  Good candidate for Intensive Rehabilitation/Hospital based                                             Will tolerate 3hrs Intensive Rehab Daily                                       [   x   ]  Short Term Rehab in Skilled Nursing Facility                             vs                                     [    x  ]  Home with Outpatient or VN services [facility]                                         [      ]  Possible Candidate for Intensive Hospital based Rehab
63M s/p RLE angio with good 3 vessel runoff and no signs of significant pvd s/p MRI this morning concerning for subcutaneous abscess    Plan:  I and D and packing of abscess with continues local wound care    Plan discussed with Dr. Narayanan
IMPRESSION  Severe Sepsis (pulse>90 beats/min , resp rate>20/min, wbc>12, lactic acidosis) due to RLE cellulitis/lymphangitis    Pt with hyponatremia, lactic acidosis,    On clindamycin IVPB 300 milliGRAM(s) IV Intermittent every 8 hours  piperacillin/tazobactam IVPB. 3.375 Gram(s) IV Intermittent every 8h    LE CT with diffuse cellulitis from mid third of leg to foot with soft tissue ulcerations along medial lateral distal leg  Medial tibial periostitis worst at the distal third consistent with osteitis    Right foot xray with no destructive lesion, acute fracture or dislocation.    SUGGESTIONs  Await blood cultures  Continue Zosyn  D/C clindamycin since Zosyn will cover MSSA & anaerobes  Repeat sodium, white blood cell count
Patient is a 64yo M with a pmhx as below, including PAD, AKA on left, right le ulcers whom admitted for right lower extremity cellulitis. PATIENT IS BEING TREATED WITH IV ABX AND WOUND CARE. ID WAS CONSULTED.
hyponatremia - improving  rle clellulitis  left aka  sepsis  PVD  HTN      plan:    cont gentle NS IVF  reduce lasix 20mg po qd  f/u bmp  fluid restriction  iv abx per ID   no vasc intervention per vasc surgery  f/u ID

## 2018-12-17 NOTE — PROCEDURE NOTE - NSPOSTCAREGUIDE_GEN_A_CORE
Verbal/written post procedure instructions were given to patient/caregiver/Instructed patient/caregiver regarding signs and symptoms of infection/Keep the cast/splint/dressing clean and dry/Instructed patient/caregiver to follow-up with primary care physician

## 2018-12-17 NOTE — PROCEDURE NOTE - ADDITIONAL PROCEDURE DETAILS
No incision performed, aspirated serosanguinos fluid, connection seen on mri confirmed between two cavities, pressure applied to one results in drainage from other

## 2018-12-17 NOTE — CONSULT NOTE ADULT - REASON FOR ADMISSION
fever and RLE erythema for 1 week

## 2018-12-17 NOTE — PROGRESS NOTE ADULT - ASSESSMENT
JUDY PEÑALOZA 63y Male  MRN#: [id number]   CODE STATUS: Full code      SUBJECTIVE  Patient is a 63y old Male who presented with a chief complaint of fever and RLE erythema for 1 week  Currently admitted to medicine with the primary diagnosis of right lower extremity cellulitis 2/2 PAD vs OM  Today is hospital day 14d, and this morning he is resting comfortably in bed and reports no overnight events. Still complains of pain when hanging his leg off the bed. Patient denies fever/chills, chest pain, shortness of breath, and abdominal pain, or any urinary symptoms.    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  Edema  GERD (gastroesophageal reflux disease)  Hypertension  S/P BKA (below knee amputation), left    ALLERGIES:  No Known Allergies    MEDICATIONS:  STANDING MEDICATIONS  cefTRIAXone   IVPB 2 Gram(s) IV Intermittent every 24 hours  cefTRIAXone   IVPB      chlorhexidine 4% Liquid 1 Application(s) Topical <User Schedule>  furosemide    Tablet 20 milliGRAM(s) Oral daily  heparin  Injectable 5000 Unit(s) SubCutaneous every 8 hours  metoprolol succinate ER 25 milliGRAM(s) Oral daily  pantoprazole    Tablet 40 milliGRAM(s) Oral before breakfast  silver sulfADIAZINE 1% Cream 1 Application(s) Topical daily  spironolactone 25 milliGRAM(s) Oral daily  vitamin A &amp; D Ointment 1 Application(s) Topical every 6 hours    PRN MEDICATIONS  acetaminophen   Tablet .. 650 milliGRAM(s) Oral every 6 hours PRN  ondansetron Injectable 4 milliGRAM(s) IV Push every 8 hours PRN  oxyCODONE    IR 5 milliGRAM(s) Oral every 8 hours PRN      VITAL SIGNS: Last 24 Hours  T(C): 35.9 (17 Dec 2018 00:00), Max: 36.7 (16 Dec 2018 16:14)  T(F): 96.7 (17 Dec 2018 00:00), Max: 98.1 (16 Dec 2018 16:14)  HR: 91 (17 Dec 2018 00:00) (87 - 94)  BP: 105/59 (17 Dec 2018 00:00) (105/59 - 120/58)  BP(mean): --  RR: 18 (17 Dec 2018 00:00) (18 - 18)  SpO2: 96% (16 Dec 2018 16:14) (96% - 96%)    LABS:                        10.4   3.99  )-----------( 70       ( 17 Dec 2018 05:36 )             30.0     12-16    131<L>  |  101  |  10  ----------------------------<  72  4.4   |  21  |  0.7    Ca    7.9<L>      16 Dec 2018 05:15  Mg     1.7     12-16    RADIOLOGY:      PHYSICAL EXAM:  GENERAL: NAD, well-developed, AAOx3  HEENT:  Atraumatic, Normocephalic. Conjunctiva and sclera clear, No JVD  PULMONARY: Clear to auscultation bilaterally;  CARDIOVASCULAR: Regular rate and rhythm; No murmurs  GASTROINTESTINAL: Obese abdomen, soft, nontender, nondistended; Bowel sounds present  MUSCULOSKELETAL:  2+ pulse on RLE, chronic skin change, improved erythema, and swelling right calf, with multiple medial ulcers, with clean dressing on top, no tenderness on palpation  NEUROLOGY: non-focal  SKIN: No rashes or lesions      ADMISSION SUMMARY  Patient is a 63y old Male who presented with a chief complaint of fever and RLE erythema for 1 week  Currently admitted to medicine with the primary diagnosis of right lower extremity cellulitis and ulcer 2/2 PAD vs OM      ASSESSMENT & PLAN  62yo male with past medical history of HTN, PAD s/p AKA, and GERD presented with fever and worsening lower extremity cellulitis, transferred from [facility] for right leg angiogram    #) Cellulitis with nonhealing ulcer - improved  - No signs of systemic infection  - Likely 2/2 chronic venous stasis, arterial duplex shows high grade stenosis on distal superficial femoral artery  - Vascular recs appreciated: no significant disease, no further surgical intervention, outpatient follow up 2 wks with Dr. Young  - CT in 12/3 shows medial tibial periostitis worse at the distal third consistent with osteitis, concern for underlying OM, ID recommends MRI RLE and Ceftriaxone 2g IV Q24hrs  - Pending MRI RLE result  - ESR >140 /CRP 8.86  - Continue wound care daily  - Pain control PRN   - Continue PT eval  - Physiatry: SNF vs home with VNS ([facility])    #) Congestive heart failure with unknown EF  - No signs of decompensation, CXR shows cardiomegaly  - Continue Toprol, Lasix and aldactone  - Nephro following    #) Hypertension  - BP well controlled  - Continue Toprol    #) Transaminitis - improved  - US abdominal shows cholelithiasis  - Continue to trend LFTs, trending down  - Avoid any hepatotoxic drugs    #) GERD  - Continue PPI    #) Leukopenia  - Etiology unclear  - Patient is not septic appearing  - Continue to trend daily    #) Hypomagnesemia  - Mg 1.7, given 2g IV Mg x 2 doses  - Recheck      DVT ppx: heparin subQ  GI ppx: Protonix  Diet: DASH  Activity: Ambulate as tolerated  Lines: Peripheral IVs  Code status: Full code  Dispo: follow up ID recs s/p MRI, plan explained to patient and his family thoroughly JUDY PEÑALOZA 63y Male  MRN#: [id number]   CODE STATUS: Full code      SUBJECTIVE  Patient is a 63y old Male who presented with a chief complaint of fever and RLE erythema for 1 week  Currently admitted to medicine with the primary diagnosis of right lower extremity cellulitis 2/2 PAD vs OM  Today is hospital day 14d, and this morning he is resting comfortably in bed and reports no overnight events. Still complains of pain when hanging his leg off the bed. Patient denies fever/chills, chest pain, shortness of breath, and abdominal pain, or any urinary symptoms.    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  Edema  GERD (gastroesophageal reflux disease)  Hypertension  S/P BKA (below knee amputation), left    ALLERGIES:  No Known Allergies    MEDICATIONS:  STANDING MEDICATIONS  cefTRIAXone   IVPB 2 Gram(s) IV Intermittent every 24 hours  cefTRIAXone   IVPB      chlorhexidine 4% Liquid 1 Application(s) Topical <User Schedule>  furosemide    Tablet 20 milliGRAM(s) Oral daily  heparin  Injectable 5000 Unit(s) SubCutaneous every 8 hours  metoprolol succinate ER 25 milliGRAM(s) Oral daily  pantoprazole    Tablet 40 milliGRAM(s) Oral before breakfast  silver sulfADIAZINE 1% Cream 1 Application(s) Topical daily  spironolactone 25 milliGRAM(s) Oral daily  vitamin A &amp; D Ointment 1 Application(s) Topical every 6 hours    PRN MEDICATIONS  acetaminophen   Tablet .. 650 milliGRAM(s) Oral every 6 hours PRN  ondansetron Injectable 4 milliGRAM(s) IV Push every 8 hours PRN  oxyCODONE    IR 5 milliGRAM(s) Oral every 8 hours PRN      VITAL SIGNS: Last 24 Hours  T(C): 35.9 (17 Dec 2018 00:00), Max: 36.7 (16 Dec 2018 16:14)  T(F): 96.7 (17 Dec 2018 00:00), Max: 98.1 (16 Dec 2018 16:14)  HR: 91 (17 Dec 2018 00:00) (87 - 94)  BP: 105/59 (17 Dec 2018 00:00) (105/59 - 120/58)  BP(mean): --  RR: 18 (17 Dec 2018 00:00) (18 - 18)  SpO2: 96% (16 Dec 2018 16:14) (96% - 96%)    LABS:                        10.4   3.99  )-----------( 70       ( 17 Dec 2018 05:36 )             30.0     12-16    131<L>  |  101  |  10  ----------------------------<  72  4.4   |  21  |  0.7    Ca    7.9<L>      16 Dec 2018 05:15  Mg     1.7     12-16    RADIOLOGY:  < from: MR Lower Ext Non-joint No Cont, Right (12.16.18 @ 23:03) >  Two loculated subcutaneous   collections along the medial aspect of the mid/distal tibia extending to   the anteromedial aspect of the ankle measuring approximately 2.1 (AP) x   1.1 (TRV) x 9.8 (CC) cm and 1.4 (TRV) x 1.0 (AP) x 2.5 (CC) cm,   respectively. Small linear connection between the 2 collections. Apparent   extension to the skin surface, indicating drainage    < end of copied text >    < from: MR Lower Ext Non-joint No Cont, Right (12.16.18 @ 23:03) >  No evidence for osteomyelitis.    < end of copied text >      PHYSICAL EXAM:  GENERAL: NAD, well-developed, AAOx3  HEENT:  Atraumatic, Normocephalic. Conjunctiva and sclera clear, No JVD  PULMONARY: Clear to auscultation bilaterally;  CARDIOVASCULAR: Regular rate and rhythm; No murmurs  GASTROINTESTINAL: Obese abdomen, soft, nontender, nondistended; Bowel sounds present  MUSCULOSKELETAL:  2+ pulse on RLE, chronic skin change, improved erythema, and swelling right calf, with multiple medial ulcers, with clean dressing on top, no tenderness on palpation  NEUROLOGY: non-focal  SKIN: No rashes or lesions      ADMISSION SUMMARY  Patient is a 63y old Male who presented with a chief complaint of fever and RLE erythema for 1 week  Currently admitted to medicine with the primary diagnosis of right lower extremity cellulitis and ulcer 2/2 PAD vs OM      ASSESSMENT & PLAN  62yo male with past medical history of HTN, PAD s/p AKA, and GERD presented with fever and worsening lower extremity cellulitis, transferred from [facility] for right leg angiogram    #) Cellulitis with nonhealing ulcer - improved  - No signs of systemic infection  - Likely 2/2 chronic venous stasis, arterial duplex shows high grade stenosis on distal superficial femoral artery  - Vascular recs appreciated: no significant disease, no further surgical intervention, outpatient follow up 2 wks with Dr. Young  - CT in 12/3 shows medial tibial periostitis worse at the distal third consistent with osteitis, concern for underlying OM, ID recommends MRI RLE and Ceftriaxone 2g IV Q24hrs  - MRI 12/16 shows two subcutaneous abscesses; No OM  - ESR >140 /CRP 8.86  - Continue wound care daily  - Pain control PRN   - Continue PT eval  - Physiatry: SNF vs home with VNS ([facility])    #) Congestive heart failure with unknown EF  - No signs of decompensation, CXR shows cardiomegaly  - Continue Toprol, Lasix and aldactone  - Nephro following    #) Hypertension  - BP well controlled  - Continue Toprol    #) Transaminitis - improved  - US abdominal shows cholelithiasis  - Continue to trend LFTs, trending down  - Avoid any hepatotoxic drugs    #) GERD  - Continue PPI    #) Leukopenia  - Etiology unclear  - Patient is not septic appearing  - Continue to trend daily    #) Hypomagnesemia  - Mg 1.7, given 2g IV Mg x 2 doses  - Recheck      DVT ppx: heparin subQ  GI ppx: Protonix  Diet: DASH  Activity: Ambulate as tolerated  Lines: Peripheral IVs  Code status: Full code  Dispo: follow up ID recs s/p MRI, plan explained to patient and his family thoroughly

## 2018-12-18 ENCOUNTER — TRANSCRIPTION ENCOUNTER (OUTPATIENT)
Age: 63
End: 2018-12-18

## 2018-12-18 VITALS
HEART RATE: 92 BPM | SYSTOLIC BLOOD PRESSURE: 143 MMHG | DIASTOLIC BLOOD PRESSURE: 63 MMHG | TEMPERATURE: 98 F | RESPIRATION RATE: 18 BRPM

## 2018-12-18 LAB
ALBUMIN SERPL ELPH-MCNC: 2.4 G/DL — LOW (ref 3.5–5.2)
ALP SERPL-CCNC: 168 U/L — HIGH (ref 30–115)
ALT FLD-CCNC: 50 U/L — HIGH (ref 0–41)
ANION GAP SERPL CALC-SCNC: 13 MMOL/L — SIGNIFICANT CHANGE UP (ref 7–14)
AST SERPL-CCNC: 62 U/L — HIGH (ref 0–41)
BASOPHILS # BLD AUTO: 0.01 K/UL — SIGNIFICANT CHANGE UP (ref 0–0.2)
BASOPHILS NFR BLD AUTO: 0.2 % — SIGNIFICANT CHANGE UP (ref 0–1)
BILIRUB SERPL-MCNC: 1.2 MG/DL — SIGNIFICANT CHANGE UP (ref 0.2–1.2)
BUN SERPL-MCNC: 11 MG/DL — SIGNIFICANT CHANGE UP (ref 10–20)
CALCIUM SERPL-MCNC: 7.9 MG/DL — LOW (ref 8.5–10.1)
CHLORIDE SERPL-SCNC: 100 MMOL/L — SIGNIFICANT CHANGE UP (ref 98–110)
CO2 SERPL-SCNC: 21 MMOL/L — SIGNIFICANT CHANGE UP (ref 17–32)
CREAT SERPL-MCNC: 0.7 MG/DL — SIGNIFICANT CHANGE UP (ref 0.7–1.5)
EOSINOPHIL # BLD AUTO: 0.03 K/UL — SIGNIFICANT CHANGE UP (ref 0–0.7)
EOSINOPHIL NFR BLD AUTO: 0.5 % — SIGNIFICANT CHANGE UP (ref 0–8)
GLUCOSE SERPL-MCNC: 77 MG/DL — SIGNIFICANT CHANGE UP (ref 70–99)
HCT VFR BLD CALC: 28.8 % — LOW (ref 42–52)
HGB BLD-MCNC: 10.1 G/DL — LOW (ref 14–18)
IMM GRANULOCYTES NFR BLD AUTO: 0.4 % — HIGH (ref 0.1–0.3)
LYMPHOCYTES # BLD AUTO: 1.33 K/UL — SIGNIFICANT CHANGE UP (ref 1.2–3.4)
LYMPHOCYTES # BLD AUTO: 24.1 % — SIGNIFICANT CHANGE UP (ref 20.5–51.1)
MAGNESIUM SERPL-MCNC: 1.6 MG/DL — LOW (ref 1.8–2.4)
MCHC RBC-ENTMCNC: 34 PG — HIGH (ref 27–31)
MCHC RBC-ENTMCNC: 35.1 G/DL — SIGNIFICANT CHANGE UP (ref 32–37)
MCV RBC AUTO: 97 FL — HIGH (ref 80–94)
MONOCYTES # BLD AUTO: 0.63 K/UL — HIGH (ref 0.1–0.6)
MONOCYTES NFR BLD AUTO: 11.4 % — HIGH (ref 1.7–9.3)
NEUTROPHILS # BLD AUTO: 3.51 K/UL — SIGNIFICANT CHANGE UP (ref 1.4–6.5)
NEUTROPHILS NFR BLD AUTO: 63.4 % — SIGNIFICANT CHANGE UP (ref 42.2–75.2)
NRBC # BLD: 0 /100 WBCS — SIGNIFICANT CHANGE UP (ref 0–0)
PLATELET # BLD AUTO: 78 K/UL — LOW (ref 130–400)
POTASSIUM SERPL-MCNC: 4.3 MMOL/L — SIGNIFICANT CHANGE UP (ref 3.5–5)
POTASSIUM SERPL-SCNC: 4.3 MMOL/L — SIGNIFICANT CHANGE UP (ref 3.5–5)
PROT SERPL-MCNC: 7.7 G/DL — SIGNIFICANT CHANGE UP (ref 6–8)
RBC # BLD: 2.97 M/UL — LOW (ref 4.7–6.1)
RBC # FLD: 12.4 % — SIGNIFICANT CHANGE UP (ref 11.5–14.5)
SODIUM SERPL-SCNC: 134 MMOL/L — LOW (ref 135–146)
WBC # BLD: 5.53 K/UL — SIGNIFICANT CHANGE UP (ref 4.8–10.8)
WBC # FLD AUTO: 5.53 K/UL — SIGNIFICANT CHANGE UP (ref 4.8–10.8)

## 2018-12-18 RX ORDER — ACETAMINOPHEN 500 MG
2 TABLET ORAL
Qty: 0 | Refills: 0 | DISCHARGE
Start: 2018-12-18

## 2018-12-18 RX ORDER — RANITIDINE HYDROCHLORIDE 150 MG/1
0 TABLET, FILM COATED ORAL
Qty: 0 | Refills: 0 | COMMUNITY

## 2018-12-18 RX ORDER — INFLUENZA VIRUS VACCINE 15; 15; 15; 15 UG/.5ML; UG/.5ML; UG/.5ML; UG/.5ML
0.5 SUSPENSION INTRAMUSCULAR ONCE
Qty: 0 | Refills: 0 | Status: COMPLETED | OUTPATIENT
Start: 2018-12-18 | End: 2018-12-18

## 2018-12-18 RX ORDER — PANTOPRAZOLE SODIUM 20 MG/1
1 TABLET, DELAYED RELEASE ORAL
Qty: 0 | Refills: 0 | DISCHARGE
Start: 2018-12-18

## 2018-12-18 RX ORDER — CIPROFLOXACIN LACTATE 400MG/40ML
1 VIAL (ML) INTRAVENOUS
Qty: 14 | Refills: 0
Start: 2018-12-18 | End: 2018-12-24

## 2018-12-18 RX ADMIN — Medication 25 MILLIGRAM(S): at 05:07

## 2018-12-18 RX ADMIN — HEPARIN SODIUM 5000 UNIT(S): 5000 INJECTION INTRAVENOUS; SUBCUTANEOUS at 05:08

## 2018-12-18 RX ADMIN — SPIRONOLACTONE 25 MILLIGRAM(S): 25 TABLET, FILM COATED ORAL at 05:07

## 2018-12-18 RX ADMIN — Medication 650 MILLIGRAM(S): at 05:09

## 2018-12-18 RX ADMIN — PANTOPRAZOLE SODIUM 40 MILLIGRAM(S): 20 TABLET, DELAYED RELEASE ORAL at 05:07

## 2018-12-18 RX ADMIN — HEPARIN SODIUM 5000 UNIT(S): 5000 INJECTION INTRAVENOUS; SUBCUTANEOUS at 14:15

## 2018-12-18 RX ADMIN — Medication 20 MILLIGRAM(S): at 05:07

## 2018-12-18 RX ADMIN — Medication 1 APPLICATION(S): at 11:25

## 2018-12-18 RX ADMIN — Medication 1 APPLICATION(S): at 05:10

## 2018-12-18 RX ADMIN — CEFTRIAXONE 100 GRAM(S): 500 INJECTION, POWDER, FOR SOLUTION INTRAMUSCULAR; INTRAVENOUS at 14:15

## 2018-12-18 RX ADMIN — Medication 650 MILLIGRAM(S): at 14:21

## 2018-12-18 RX ADMIN — Medication 1 APPLICATION(S): at 17:15

## 2018-12-18 NOTE — DISCHARGE NOTE ADULT - CARE PROVIDER_API CALL
Iron Calix), Internal Medicine  [address] [place] [address]  Phone: [phone]  Fax: [phone]    Ap Young), Surgery; Vascular Surgery  [address] [place] [address]  Phone: [phone]  Fax: [phone]

## 2018-12-18 NOTE — PROGRESS NOTE ADULT - SUBJECTIVE AND OBJECTIVE BOX
Progress Note: Surgery  Patient: JUDY PEÑALOZA , 63y (1955)Male   MRN: [id number]  Location: [address]  Visit: 12-03-18 Inpatient  Date: 12-18-18 @ 05:16    Hospital Day: 16    Procedure/Injury: s/p aspiration of RLE abscess     Events over 24h: RLE abscess aspirated, no I&D needed, patient is doing well, pain is controlled.     Vitals: T(F): 98 (12-18-18 @ 02:44), Max: 98 (12-17-18 @ 16:00)  HR: 91 (12-18-18 @ 02:44)  BP: 117/56 (12-18-18 @ 02:44) (116/56 - 125/60)  RR: 18 (12-18-18 @ 02:44)  SpO2: --    Diet: Diet, Consistent Carbohydrate/No Snacks (12-13-18 @ 10:19)      Bowel function:   Bowel movement [x]   Flatus [x]    Out:   12-16-18 @ 07:01  -  12-17-18 @ 07:00  --------------------------------------------------------  OUT:    Voided: 1850 mL  Total OUT: 1850 mL      12-17-18 @ 07:01  -  12-18-18 @ 05:16  --------------------------------------------------------  OUT:    Voided: 1100 mL  Total OUT: 1100 mL      Physical Examination:  General: NAD, lying in bed  HEENT: NCAT, CHASE, WNL  Heart: S1 S2, No MRG RRR   Lungs: CTABL +BS Equal BL, No WRC  Abdomen: Soft, non-distended, nontender.   MSK/Extremities: left BKA, right shin with erythema       Medications: [Standing]  cefTRIAXone   IVPB 2 Gram(s) IV Intermittent every 24 hours  cefTRIAXone   IVPB      chlorhexidine 4% Liquid 1 Application(s) Topical <User Schedule>  furosemide    Tablet 20 milliGRAM(s) Oral daily  heparin  Injectable 5000 Unit(s) SubCutaneous every 8 hours  metoprolol succinate ER 25 milliGRAM(s) Oral daily  pantoprazole    Tablet 40 milliGRAM(s) Oral before breakfast  silver sulfADIAZINE 1% Cream 1 Application(s) Topical daily  spironolactone 25 milliGRAM(s) Oral daily  vitamin A &amp; D Ointment 1 Application(s) Topical every 6 hours    Medications:[PRN]  acetaminophen   Tablet .. 650 milliGRAM(s) Oral every 6 hours PRN  ondansetron Injectable 4 milliGRAM(s) IV Push every 8 hours PRN  oxyCODONE    IR 5 milliGRAM(s) Oral every 8 hours PRN    Labs:                        10.4   3.99  )-----------( 70       ( 17 Dec 2018 05:36 )             30.0     12-17    131<L>  |  98  |  10  ----------------------------<  80  4.1   |  22  |  0.7    Ca    8.1<L>      17 Dec 2018 05:36  Mg     2.2     12-17    TPro  7.5  /  Alb  2.3<L>  /  TBili  1.1  /  DBili  0.4<H>  /  AST  57<H>  /  ALT  47<H>  /  AlkPhos  159<H>  12-17    LIVER FUNCTIONS - ( 17 Dec 2018 13:50 )  Alb: 2.3 g/dL / Pro: 7.5 g/dL / ALK PHOS: 159 U/L / ALT: 47 U/L / AST: 57 U/L / GGT: x           RADIOLOGY & ADDITIONAL STUDIES:  < from: MR Lower Ext Non-joint No Cont, Right (12.16.18 @ 23:03) >  1. Subcutaneous abscesses as above.  2. No evidence for osteomyelitis.  3. Focal stress related edema within the mid/distal fibula without   discrete fracture.    < end of copied text >    Date/Time: 12-18-18 @ 05:16

## 2018-12-18 NOTE — PROGRESS NOTE ADULT - SUBJECTIVE AND OBJECTIVE BOX
NEPHROLOGY FOLLOW UP NOTE    pt seen earlier today  sNa+ better  for dc home today  no fever  bp's fair  d/w team    PAST MEDICAL & SURGICAL HISTORY:  Circulation disorder of lower extremity: left lower extremity  Edema  GERD (gastroesophageal reflux disease)  Hypertension  S/P BKA (below knee amputation), left    Allergies:  No Known Allergies    Home Medications Reviewed    SOCIAL HISTORY:  Denies ETOH,Smoking,   FAMILY HISTORY:        REVIEW OF SYSTEMS:    All other review of systems is negative unless indicated above.      PHYSICAL EXAM:  NAD  awake and alert  moist mm  no jvd  cta bl  rrr  soft  left aka  right le less erythematous  + PP rle  no cvat    Hospital Medications:   MEDICATIONS  (STANDING):  cefTRIAXone   IVPB 2 Gram(s) IV Intermittent every 24 hours  cefTRIAXone   IVPB      chlorhexidine 4% Liquid 1 Application(s) Topical <User Schedule>  furosemide    Tablet 20 milliGRAM(s) Oral daily  heparin  Injectable 5000 Unit(s) SubCutaneous every 8 hours  metoprolol succinate ER 25 milliGRAM(s) Oral daily  pantoprazole    Tablet 40 milliGRAM(s) Oral before breakfast  silver sulfADIAZINE 1% Cream 1 Application(s) Topical daily  spironolactone 25 milliGRAM(s) Oral daily  vitamin A &amp; D Ointment 1 Application(s) Topical every 6 hours        VITALS:  T(F): 97.7 (12-18-18 @ 16:05), Max: 98.1 (12-18-18 @ 07:29)  HR: 92 (12-18-18 @ 16:05)  BP: 143/63 (12-18-18 @ 16:05)  RR: 18 (12-18-18 @ 16:05)  SpO2: --  Wt(kg): --    12-16 @ 07:01  -  12-17 @ 07:00  --------------------------------------------------------  IN: 240 mL / OUT: 1850 mL / NET: -1610 mL    12-17 @ 07:01  -  12-18 @ 07:00  --------------------------------------------------------  IN: 280 mL / OUT: 1100 mL / NET: -820 mL    12-18 @ 07:01  -  12-18 @ 22:35  --------------------------------------------------------  IN: 960 mL / OUT: 1400 mL / NET: -440 mL          LABS:  12-18    134<L>  |  100  |  11  ----------------------------<  77  4.3   |  21  |  0.7    Ca    7.9<L>      18 Dec 2018 06:26  Mg     1.6     12-18    TPro  7.7  /  Alb  2.4<L>  /  TBili  1.2  /  DBili      /  AST  62<H>  /  ALT  50<H>  /  AlkPhos  168<H>  12-18                          10.1   5.53  )-----------( 78       ( 18 Dec 2018 06:26 )             28.8       Urine Studies:        RADIOLOGY & ADDITIONAL STUDIES:            RADIOLOGY & ADDITIONAL STUDIES:

## 2018-12-18 NOTE — DISCHARGE NOTE ADULT - CARE PROVIDERS DIRECT ADDRESSES
[email or web address]

## 2018-12-18 NOTE — DISCHARGE NOTE ADULT - MEDICATION SUMMARY - MEDICATIONS TO TAKE
I will START or STAY ON the medications listed below when I get home from the hospital:    Aldactone 25 mg oral tablet  -- one tablet by mouth daily  -- Indication: For CHF    acetaminophen 325 mg oral tablet  -- 2 tab(s) by mouth every 6 hours, As needed, Temp greater or equal to 38C (100.4F), Mild Pain (1 - 3)  -- Indication: For Pain management    doxycycline hyclate 100 mg oral capsule  -- 1 cap(s) by mouth 2 times a day   -- Avoid prolonged or excessive exposure to direct and/or artificial sunlight while taking this medication.  Do not take this drug if you are pregnant.  Finish all this medication unless otherwise directed by prescriber.  Medication should be taken with plenty of water.    -- Indication: For RLE Cellulitis    Toprol-XL 25 mg oral tablet, extended release  -- 1 tab(s) by mouth once a day  -- Indication: For CHF    Lasix 20 mg oral tablet  -- three tablets daily  -- Indication: For CHF    pantoprazole 40 mg oral delayed release tablet  -- 1 tab(s) by mouth once a day (before a meal)  -- Indication: For GERD (gastroesophageal reflux disease)    ciprofloxacin 500 mg oral tablet  -- 1 tab(s) by mouth 2 times a day   -- Avoid prolonged or excessive exposure to direct and/or artificial sunlight while taking this medication.  Check with your doctor before becoming pregnant.  Do not take dairy products, antacids, or iron preparations within one hour of this medication.  Finish all this medication unless otherwise directed by prescriber.  Medication should be taken with plenty of water.    -- Indication: For RLE Cellulitis

## 2018-12-18 NOTE — DISCHARGE NOTE ADULT - MEDICATION SUMMARY - MEDICATIONS TO STOP TAKING
I will STOP taking the medications listed below when I get home from the hospital:    Zantac 150 oral tablet  -- one tablet at 5 p.m. daily

## 2018-12-18 NOTE — PROGRESS NOTE ADULT - ASSESSMENT
<<<RESIDENT DISCHARGE NOTE>>>     JUDY PEÑALOZA  MRN-[id number]    VITAL SIGNS:  T(F): 97.7 (12-18-18 @ 16:05), Max: 98.1 (12-18-18 @ 07:29)  HR: 92 (12-18-18 @ 16:05)  BP: 143/63 (12-18-18 @ 16:05)  SpO2: --      PHYSICAL EXAMINATION:  GENERAL: NAD, well-developed, AAOx3  HEENT:  Atraumatic, Normocephalic. Conjunctiva and sclera clear, No JVD  PULMONARY: Clear to auscultation bilaterally;  CARDIOVASCULAR: Regular rate and rhythm; No murmurs  GASTROINTESTINAL: Obese abdomen, soft, nontender, nondistended; Bowel sounds present  MUSCULOSKELETAL:  2+ pulse on RLE, chronic skin change, improved erythema, and swelling right calf, with multiple medial leg ulcers, with clean dressing on top, no tenderness on palpation  NEUROLOGY: non-focal  SKIN: No rashes or lesions    TEST RESULTS:                        10.1   5.53  )-----------( 78       ( 18 Dec 2018 06:26 )             28.8       12-18    134<L>  |  100  |  11  ----------------------------<  77  4.3   |  21  |  0.7    Ca    7.9<L>      18 Dec 2018 06:26  Mg     1.6     12-18    TPro  7.7  /  Alb  2.4<L>  /  TBili  1.2  /  DBili  x   /  AST  62<H>  /  ALT  50<H>  /  AlkPhos  168<H>  12-18      FINAL DISCHARGE INTERVIEW:  Resident(s) Present: (Name: MARIAH Muller, RN Present: (Name: Maribel Oquendo RN)    DISCHARGE MEDICATION RECONCILIATION  reviewed with Attending (Name: Kaylah Davidson MD)    DISPOSITION:   [  ] Home,    [  ] Home with Visiting Nursing Services,   [  X  ]  SNF/ NH,    [   ] Acute Rehab (4A),   [   ] Other (Specify:_________)

## 2018-12-18 NOTE — PROGRESS NOTE ADULT - PROVIDER SPECIALTY LIST ADULT
Infectious Disease
Internal Medicine
Nephrology
Surgery
Surgery
Vascular Surgery
Infectious Disease
Internal Medicine
Internal Medicine

## 2018-12-18 NOTE — PROGRESS NOTE ADULT - ASSESSMENT
63M    Hypertension  S/P BKA left    Admitted with Severe Sepsis   RLE cellulitis/lymphangitis  12/3 bcx NGTD  CT LE No CT evidence of necrotizing fasciitis. Diffuse cellulitis from mid third of leg to foot with soft tissue ulcerations along medial lateral distal leg. Medial tibial periostitis worst at the distal third consistent with osteitis  Arterial Duplex High-grade stenosis in the distal superficial femoral artery  ESR/CRP elevated  MRI  Two loculated subcutaneous collections along the medial aspect of the mid/distal tibia extending to the anteromedial aspect of the ankle measuring approximately 2.1 (AP) x 1.1 (TRV) x 9.8 (CC) cm and 1.4 (TRV) x 1.0 (AP) x 2.5 (CC) cm, respectively. Small linear connection between the 2 collections    - s/p I&D per Surgery, pending cultures  - Ceftriaxone 2g q24h, pending cx data can likely d/c on PO augmentin 875mg PO BID and doxy 100mg PO BID for another 7 days   - Vascular following    Spectra [id number]

## 2018-12-18 NOTE — PROGRESS NOTE ADULT - SUBJECTIVE AND OBJECTIVE BOX
Patient was seen and examined. Spoke with RN. Chart reviewed.    No events overnight.  Vital Signs Last 24 Hrs  T(F): 98.1 (18 Dec 2018 07:29), Max: 98.1 (18 Dec 2018 07:29)  HR: 89 (18 Dec 2018 07:29) (88 - 95)  BP: 116/53 (18 Dec 2018 07:29) (115/56 - 125/60)  SpO2: --  MEDICATIONS  (STANDING):  cefTRIAXone   IVPB 2 Gram(s) IV Intermittent every 24 hours  cefTRIAXone   IVPB      chlorhexidine 4% Liquid 1 Application(s) Topical <User Schedule>  furosemide    Tablet 20 milliGRAM(s) Oral daily  heparin  Injectable 5000 Unit(s) SubCutaneous every 8 hours  metoprolol succinate ER 25 milliGRAM(s) Oral daily  pantoprazole    Tablet 40 milliGRAM(s) Oral before breakfast  silver sulfADIAZINE 1% Cream 1 Application(s) Topical daily  spironolactone 25 milliGRAM(s) Oral daily  vitamin A &amp; D Ointment 1 Application(s) Topical every 6 hours    MEDICATIONS  (PRN):  acetaminophen   Tablet .. 650 milliGRAM(s) Oral every 6 hours PRN Temp greater or equal to 38C (100.4F), Mild Pain (1 - 3)  ondansetron Injectable 4 milliGRAM(s) IV Push every 8 hours PRN Nausea and/or Vomiting  oxyCODONE    IR 5 milliGRAM(s) Oral every 8 hours PRN Severe Pain (7 - 10)    Labs:                        10.1   5.53  )-----------( 78       ( 18 Dec 2018 06:26 )             28.8                         10.4   3.99  )-----------( 70       ( 17 Dec 2018 05:36 )             30.0     18 Dec 2018 06:26    134    |  100    |  11     ----------------------------<  77     4.3     |  21     |  0.7    17 Dec 2018 05:36    131    |  98     |  10     ----------------------------<  80     4.1     |  22     |  0.7      Ca    7.9        18 Dec 2018 06:26  Ca    8.1        17 Dec 2018 05:36  Mg     1.6       18 Dec 2018 06:26  Mg     2.2       17 Dec 2018 05:36    TPro  7.7    /  Alb  2.4    /  TBili  1.2    /  DBili  x      /  AST  62     /  ALT  50     /  AlkPhos  168    18 Dec 2018 06:26  TPro  7.5    /  Alb  2.3    /  TBili  1.1    /  DBili  0.4    /  AST  57     /  ALT  47     /  AlkPhos  159    17 Dec 2018 13:50            Radiology:    General: comfortable, NAD  Neurology: A&Ox3, nonfocal  Head:  Normocephalic, atraumatic  ENT:  Mucosa moist, no ulcerations  Neck:  Supple, no JVD,   Skin: no breakdown  Resp: CTA B/L  CV: RRR, S1S2,   GI: Soft, NT, bowel sounds  MS: RLE + peripheral pulses, FROM all 4 extremity

## 2018-12-18 NOTE — PROGRESS NOTE ADULT - ASSESSMENT
hyponatremia - better  hypomagnesemia - better  hyperkalemia - better  rle cellulitis - better  left aka  sepsis  PVD - no intervention after le angio today  HTN  abnl lft's / cholelithiasis on abd sono      plan:    cont low dose lasix and aldactone  complete abx per id  encourage salty snacks  cont lopressor  dc home  oupt bmp f/u in 2-4 weeks

## 2018-12-18 NOTE — PROGRESS NOTE ADULT - ASSESSMENT
Assessment:  63y Male patient admitted S/P aspiration of RLE abscess    Plan:  - continue local wound care  - f/u cultures sent of abscess  - DVT & GI PPX  - Encourage IS

## 2018-12-18 NOTE — PROGRESS NOTE ADULT - SUBJECTIVE AND OBJECTIVE BOX
JUDY PEÑALOZA  63y, Male      OVERNIGHT EVENTS:  s/p i&D culture pending    ROS negative except as per above    VITALS:  T(F): 98.1, Max: 98.1 (12-18-18 @ 07:29)  HR: 89  BP: 116/53  RR: 18Vital Signs Last 24 Hrs  T(C): 36.7 (18 Dec 2018 07:29), Max: 36.7 (17 Dec 2018 16:00)  T(F): 98.1 (18 Dec 2018 07:29), Max: 98.1 (18 Dec 2018 07:29)  HR: 89 (18 Dec 2018 07:29) (88 - 95)  BP: 116/53 (18 Dec 2018 07:29) (115/56 - 125/60)  BP(mean): --  RR: 18 (18 Dec 2018 07:29) (18 - 18)  SpO2: --    PHYSICAL EXAM  Gen: Awake and alert, non-toxic appearing, NAD  HEENT: NCAT. EOMI. MMM.   Neck: Supple  CV: RRR  Lungs: CTAB  Abd: Soft. NTND  Extr: wwp, RLE with decreased edema, erythema. superficial ulcers s/p aspiration  Skin: no rash  Neuro: No focal deficits  Lines: clean      TESTS & MEASUREMENTS:                        10.1   5.53  )-----------( 78       ( 18 Dec 2018 06:26 )             28.8     12-18    134<L>  |  100  |  11  ----------------------------<  77  4.3   |  21  |  0.7    Ca    7.9<L>      18 Dec 2018 06:26  Mg     1.6     12-18    TPro  7.7  /  Alb  2.4<L>  /  TBili  1.2  /  DBili  x   /  AST  62<H>  /  ALT  50<H>  /  AlkPhos  168<H>  12-18    LIVER FUNCTIONS - ( 18 Dec 2018 06:26 )  Alb: 2.4 g/dL / Pro: 7.7 g/dL / ALK PHOS: 168 U/L / ALT: 50 U/L / AST: 62 U/L / GGT: x                 RADIOLOGY & ADDITIONAL TESTS:    ANTIBIOTICS:    cefTRIAXone   IVPB   100 mL/Hr IV Intermittent (12-14-18 @ 15:29)    cefTRIAXone   IVPB   100 mL/Hr IV Intermittent (12-17-18 @ 14:16)   100 mL/Hr IV Intermittent (12-16-18 @ 13:04)   100 mL/Hr IV Intermittent (12-15-18 @ 14:26)    clindamycin IVPB   100 mL/Hr IV Intermittent (12-03-18 @ 12:10)    clindamycin IVPB   100 mL/Hr IV Intermittent (12-05-18 @ 21:50)   100 mL/Hr IV Intermittent (12-05-18 @ 14:55)   100 mL/Hr IV Intermittent (12-05-18 @ 05:22)   100 mL/Hr IV Intermittent (12-04-18 @ 21:18)   100 mL/Hr IV Intermittent (12-04-18 @ 13:48)   100 mL/Hr IV Intermittent (12-04-18 @ 05:07)   100 mL/Hr IV Intermittent (12-03-18 @ 21:14)    linezolid  IVPB   300 mL/Hr IV Intermittent (12-06-18 @ 00:25)    linezolid  IVPB   300 mL/Hr IV Intermittent (12-13-18 @ 06:21)   300 mL/Hr IV Intermittent (12-12-18 @ 17:51)   300 mL/Hr IV Intermittent (12-12-18 @ 05:10)   300 mL/Hr IV Intermittent (12-11-18 @ 18:20)   300 mL/Hr IV Intermittent (12-11-18 @ 08:00)   300 mL/Hr IV Intermittent (12-10-18 @ 22:52)   300 mL/Hr IV Intermittent (12-10-18 @ 05:40)   300 mL/Hr IV Intermittent (12-09-18 @ 17:46)   300 mL/Hr IV Intermittent (12-09-18 @ 05:31)   300 mL/Hr IV Intermittent (12-08-18 @ 17:34)   300 mL/Hr IV Intermittent (12-08-18 @ 06:11)   300 mL/Hr IV Intermittent (12-07-18 @ 17:28)   300 mL/Hr IV Intermittent (12-07-18 @ 06:58)   300 mL/Hr IV Intermittent (12-06-18 @ 17:45)   300 mL/Hr IV Intermittent (12-06-18 @ 06:07)    linezolid  IVPB   300 mL/Hr IV Intermittent (12-13-18 @ 11:43)    linezolid  IVPB   300 mL/Hr IV Intermittent (12-14-18 @ 05:23)    piperacillin/tazobactam IVPB.   25 mL/Hr IV Intermittent (12-13-18 @ 06:21)   25 mL/Hr IV Intermittent (12-12-18 @ 21:37)   25 mL/Hr IV Intermittent (12-12-18 @ 14:33)   25 mL/Hr IV Intermittent (12-12-18 @ 05:10)   25 mL/Hr IV Intermittent (12-11-18 @ 21:28)   25 mL/Hr IV Intermittent (12-11-18 @ 14:16)   25 mL/Hr IV Intermittent (12-11-18 @ 05:31)   25 mL/Hr IV Intermittent (12-10-18 @ 22:52)   25 mL/Hr IV Intermittent (12-10-18 @ 14:19)   25 mL/Hr IV Intermittent (12-10-18 @ 05:40)   25 mL/Hr IV Intermittent (12-09-18 @ 21:21)   25 mL/Hr IV Intermittent (12-09-18 @ 13:55)   25 mL/Hr IV Intermittent (12-09-18 @ 05:31)   25 mL/Hr IV Intermittent (12-08-18 @ 22:29)   25 mL/Hr IV Intermittent (12-08-18 @ 14:25)   25 mL/Hr IV Intermittent (12-08-18 @ 06:16)   25 mL/Hr IV Intermittent (12-07-18 @ 22:01)   25 mL/Hr IV Intermittent (12-07-18 @ 14:46)   25 mL/Hr IV Intermittent (12-07-18 @ 06:58)   25 mL/Hr IV Intermittent (12-06-18 @ 22:42)   25 mL/Hr IV Intermittent (12-06-18 @ 14:31)   25 mL/Hr IV Intermittent (12-06-18 @ 06:07)   25 mL/Hr IV Intermittent (12-05-18 @ 21:49)   25 mL/Hr IV Intermittent (12-05-18 @ 14:55)   25 mL/Hr IV Intermittent (12-05-18 @ 05:22)   25 mL/Hr IV Intermittent (12-04-18 @ 21:18)   25 mL/Hr IV Intermittent (12-04-18 @ 13:48)   25 mL/Hr IV Intermittent (12-04-18 @ 05:07)   25 mL/Hr IV Intermittent (12-03-18 @ 21:18)    piperacillin/tazobactam IVPB.   200 mL/Hr IV Intermittent (12-03-18 @ 12:16)    piperacillin/tazobactam IVPB.   25 mL/Hr IV Intermittent (12-14-18 @ 05:23)   25 mL/Hr IV Intermittent (12-13-18 @ 21:38)   25 mL/Hr IV Intermittent (12-13-18 @ 14:58)        cefTRIAXone   IVPB 2 Gram(s) IV Intermittent every 24 hours  cefTRIAXone   IVPB

## 2018-12-18 NOTE — PROGRESS NOTE ADULT - REASON FOR ADMISSION
fever and RLE erythema for 1 week

## 2018-12-18 NOTE — DISCHARGE NOTE ADULT - PATIENT PORTAL LINK FT
You can access the [facility] Patient Portal, offered by [facility], by registering with the following website: [email or web address]

## 2018-12-18 NOTE — DISCHARGE NOTE ADULT - CARE PLAN
Principal Discharge DX:	Cellulitis of right lower extremity  Goal:	Continue medical management and routine wound care  Assessment and plan of treatment:	You are diagnosed with right lower extremity cellulitis. You were found to have two abscesses that was drained. We will continue to follow up the culture result, and at the mean time, please continue to take the prescribed antibiotics for 7 more days. Please do routine wound care and keep the wound clean. Please also continue to follow up with Vascular Dr. Young in two weeks outpatient.   Local wound care instruction: cleanse wound with sterile saline and place silverdene on wound, then adaptic, cover with DSD daily.  Secondary Diagnosis:	Chronic congestive heart failure, unspecified heart failure type  Goal:	Continue medical management  Assessment and plan of treatment:	You have a history of chronic congestive heart failure. You will need to maintain a low salt diet (no more than half of teaspoon of salt) and restrict your daily fluid intake to <1.5 liter. Please continue to take the prescribed as directed daily and watch your weight. If there is increased in weight >3lbs per day or >5lbs per week, you will need to contact your primary care physician for medication changes. If you are having acute symptoms such as shortness of breath on minimal exertion and significant swelling, please return to the ED for evaluation. Please make a follow up appointment with your Cardiologist within 3-5 days after you are discharged.  Secondary Diagnosis:	Hypertension, unspecified type  Goal:	Continue medical management  Assessment and plan of treatment:	You have a history of hypertension. Your blood pressure is well controlled with medication. Please continue a low salt diet and keep taking the prescribed medication. Please also follow up with your PCP outpatient.

## 2018-12-18 NOTE — DISCHARGE NOTE ADULT - PLAN OF CARE
Continue medical management and routine wound care You are diagnosed with right lower extremity cellulitis. You were found to have two abscesses that was drained. We will continue to follow up the culture result, and at the mean time, please continue to take the prescribed antibiotics for 7 more days. Please do routine wound care and keep the wound clean. Please also continue to follow up with Vascular Dr. Young in two weeks outpatient.   Local wound care instruction: cleanse wound with sterile saline and place silverdene on wound, then adaptic, cover with DSD daily. Continue medical management You have a history of chronic congestive heart failure. You will need to maintain a low salt diet (no more than half of teaspoon of salt) and restrict your daily fluid intake to <1.5 liter. Please continue to take the prescribed as directed daily and watch your weight. If there is increased in weight >3lbs per day or >5lbs per week, you will need to contact your primary care physician for medication changes. If you are having acute symptoms such as shortness of breath on minimal exertion and significant swelling, please return to the ED for evaluation. Please make a follow up appointment with your Cardiologist within 3-5 days after you are discharged. You have a history of hypertension. Your blood pressure is well controlled with medication. Please continue a low salt diet and keep taking the prescribed medication. Please also follow up with your PCP outpatient.

## 2018-12-18 NOTE — PROGRESS NOTE ADULT - ASSESSMENT
64yo male with past medical history of HTN, PAD s/p AKA, and GERD presented with fever and worsening lower extremity cellulitis, transferred from [facility] for right leg angiogram    #) Cellulitis with nonhealing ulcer - improved  - No signs of systemic infection  - Likely 2/2 chronic venous stasis, arterial duplex shows high grade stenosis on distal superficial femoral artery  - Vascular recs appreciated: no significant disease, no further surgical intervention, outpatient follow up 2 wks with Dr. Young  - CT in 12/3 shows medial tibial periostitis worse at the distal third consistent with osteitis, MRI 12/16 shows two subcutaneous abscesses; No OM  - I&D performed by Surgery, continue ceftriaxone 2g Q24hr for now  - ESR >140 /CRP 8.86  - Continue wound care daily  - Pain control PRN   - Continue PT eval  - Physiatry: SNF vs home with VNS ([facility])    #) Congestive heart failure with unknown EF  - No signs of decompensation, CXR shows cardiomegaly  - Continue Toprol, Lasix and aldactone  - Nephro following    #) Hypertension  - BP well controlled  - Continue Toprol    #) Transaminitis - improved  - US abdominal shows cholelithiasis, no RUB pain  - LFTs continues to trend down  - Avoid any hepatotoxic drugs    #) GERD  - Continue PPI    #) Leukopenia  - Etiology unclear - dilutional?  - Patient is not septic appearing  - Continue to trend daily    DVT ppx: heparin subQ  GI ppx: Protonix  Diet: DASH  Activity: Ambulate as tolerated  Lines: Peripheral IVs  Code status: Full code  Dispo: pending ID dispo, continue PT

## 2018-12-18 NOTE — PROGRESS NOTE ADULT - ASSESSMENT
JUDY PEÑALOZA 63y Male  MRN#: [id number]   CODE STATUS: Full code      SUBJECTIVE  Patient is a 63y old Male who presented with a chief complaint of fever and RLE erythema for 1 week  Currently admitted to medicine with the primary diagnosis of right lower extremity cellulitis 2/2 chronic venous stasis  Today is hospital day 15d, and this morning he is resting comfortably in bed and reports no overnight events. S/p I&D of subcutaneous abscesses by Surgery. Pain has improved. Patient denies fever/chills, chest pain, shortness of breath, and abdominal pain, or any urinary symptoms.      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  Edema  GERD (gastroesophageal reflux disease)  Hypertension  S/P BKA (below knee amputation), left    ALLERGIES:  No Known Allergies    MEDICATIONS:  STANDING MEDICATIONS  cefTRIAXone   IVPB 2 Gram(s) IV Intermittent every 24 hours  cefTRIAXone   IVPB      chlorhexidine 4% Liquid 1 Application(s) Topical <User Schedule>  furosemide    Tablet 20 milliGRAM(s) Oral daily  heparin  Injectable 5000 Unit(s) SubCutaneous every 8 hours  metoprolol succinate ER 25 milliGRAM(s) Oral daily  pantoprazole    Tablet 40 milliGRAM(s) Oral before breakfast  silver sulfADIAZINE 1% Cream 1 Application(s) Topical daily  spironolactone 25 milliGRAM(s) Oral daily  vitamin A &amp; D Ointment 1 Application(s) Topical every 6 hours    PRN MEDICATIONS  acetaminophen   Tablet .. 650 milliGRAM(s) Oral every 6 hours PRN  ondansetron Injectable 4 milliGRAM(s) IV Push every 8 hours PRN  oxyCODONE    IR 5 milliGRAM(s) Oral every 8 hours PRN      VITAL SIGNS: Last 24 Hours  T(C): 36.7 (18 Dec 2018 02:44), Max: 36.7 (17 Dec 2018 16:00)  T(F): 98 (18 Dec 2018 02:44), Max: 98 (17 Dec 2018 16:00)  HR: 91 (18 Dec 2018 02:44) (88 - 91)  BP: 117/56 (18 Dec 2018 02:44) (116/56 - 125/60)  BP(mean): --  RR: 18 (18 Dec 2018 02:44) (18 - 18)  SpO2: --    LABS:                        10.4   3.99  )-----------( 70       ( 17 Dec 2018 05:36 )             30.0     12-17    131<L>  |  98  |  10  ----------------------------<  80  4.1   |  22  |  0.7    Ca    8.1<L>      17 Dec 2018 05:36  Mg     2.2     12-17    TPro  7.5  /  Alb  2.3<L>  /  TBili  1.1  /  DBili  0.4<H>  /  AST  57<H>  /  ALT  47<H>  /  AlkPhos  159<H>  12-17    RADIOLOGY:      PHYSICAL EXAM:  GENERAL: NAD, well-developed, AAOx3  HEENT:  Atraumatic, Normocephalic. Conjunctiva and sclera clear, No JVD  PULMONARY: Clear to auscultation bilaterally;  CARDIOVASCULAR: Regular rate and rhythm; No murmurs  GASTROINTESTINAL: Obese abdomen, soft, nontender, nondistended; Bowel sounds present  MUSCULOSKELETAL:  2+ pulse on RLE, chronic skin change, improved erythema, and swelling right calf, with multiple medial leg ulcers, with clean dressing on top, no tenderness on palpation  NEUROLOGY: non-focal  SKIN: No rashes or lesions      ADMISSION SUMMARY  Patient is a 63y old Male who presented with a chief complaint of fever and RLE erythema for 1 week  Currently admitted to medicine with the primary diagnosis of right lower extremity cellulitis and ulcer 2/2 chronic venous stasis      ASSESSMENT & PLAN  62yo male with past medical history of HTN, PAD s/p AKA, and GERD presented with fever and worsening lower extremity cellulitis, transferred from [facility] for right leg angiogram    #) Cellulitis with nonhealing ulcer - improved  - No signs of systemic infection  - Likely 2/2 chronic venous stasis, arterial duplex shows high grade stenosis on distal superficial femoral artery  - Vascular recs appreciated: no significant disease, no further surgical intervention, outpatient follow up 2 wks with Dr. Young  - CT in 12/3 shows medial tibial periostitis worse at the distal third consistent with osteitis, MRI 12/16 shows two subcutaneous abscesses; No OM  - I&D performed by Surgery, continue ceftriaxone 2g Q24hr for now  - ESR >140 /CRP 8.86  - Continue wound care daily  - Pain control PRN   - Continue PT eval  - Physiatry: SNF vs home with VNS ([facility])    #) Congestive heart failure with unknown EF  - No signs of decompensation, CXR shows cardiomegaly  - Continue Toprol, Lasix and aldactone  - Nephro following    #) Hypertension  - BP well controlled  - Continue Toprol    #) Transaminitis - improved  - US abdominal shows cholelithiasis, no RUB pain  - LFTs continues to trend down  - Avoid any hepatotoxic drugs    #) GERD  - Continue PPI    #) Leukopenia  - Etiology unclear - dilutional?  - Patient is not septic appearing  - Continue to trend daily      DVT ppx: heparin subQ  GI ppx: Protonix  Diet: DASH  Activity: Ambulate as tolerated  Lines: Peripheral IVs  Code status: Full code  Dispo: pending ID dispo, continue PT

## 2018-12-18 NOTE — DISCHARGE NOTE ADULT - HOSPITAL COURSE
64 yo male with PMHx GERD, HTN, PVD s/p LEFT BKA - 18 years ago due to gangrene presents for right LE cellulitis. Patient has chronic leg wound and had worsening pain starting yesterday. Patient denies chest pain, SOB, abdominal pain, nausea, vomiting, diarrhea, dizziness, weakness, changes in PO intake, changes in urine output, changes in mental status  subjective fever + at home.    Patient was evaluated by Vascular service at [facility] and then transferred to [facility] for right leg angiogram. Patient was also followed by ID services and was on Zyvox and Zosyn. Patient's cellulitis improved with treatment and his angiogram showed no significant stenosis. Patient is cleared from vascular standpoint and will need follow up outpatient. ID was concerned for OM as CT shows osteitis so MRI was done. MRI RLE ruled out OM but shows two subcutaneous abscesses. I&D was performed by surgery and patient is cleared to be discharged to SNF with oral abx.

## 2018-12-19 ENCOUNTER — INBOUND DOCUMENT (OUTPATIENT)
Age: 63
End: 2018-12-19

## 2018-12-23 LAB
CULTURE RESULTS: SIGNIFICANT CHANGE UP
SPECIMEN SOURCE: SIGNIFICANT CHANGE UP

## 2020-07-16 ENCOUNTER — APPOINTMENT (OUTPATIENT)
Dept: VASCULAR SURGERY | Facility: CLINIC | Age: 65
End: 2020-07-16
Payer: MEDICARE

## 2020-07-16 VITALS
HEIGHT: 68 IN | WEIGHT: 192 LBS | DIASTOLIC BLOOD PRESSURE: 73 MMHG | BODY MASS INDEX: 29.1 KG/M2 | SYSTOLIC BLOOD PRESSURE: 138 MMHG

## 2020-07-16 DIAGNOSIS — M79.89 OTHER SPECIFIED SOFT TISSUE DISORDERS: ICD-10-CM

## 2020-07-16 DIAGNOSIS — I10 ESSENTIAL (PRIMARY) HYPERTENSION: ICD-10-CM

## 2020-07-16 PROCEDURE — 93971 EXTREMITY STUDY: CPT

## 2020-07-16 PROCEDURE — 99213 OFFICE O/P EST LOW 20 MIN: CPT | Mod: 25

## 2020-07-16 PROCEDURE — 29580 STRAPPING UNNA BOOT: CPT | Mod: RT

## 2020-07-16 RX ORDER — FUROSEMIDE 80 MG/1
TABLET ORAL
Refills: 0 | Status: ACTIVE | COMMUNITY

## 2020-07-16 RX ORDER — LORATADINE 5 MG/5 ML
SOLUTION, ORAL ORAL
Refills: 0 | Status: ACTIVE | COMMUNITY

## 2020-07-16 RX ORDER — METOPROLOL SUCCINATE 25 MG/1
25 TABLET, EXTENDED RELEASE ORAL
Refills: 0 | Status: ACTIVE | COMMUNITY

## 2020-07-16 RX ORDER — ACETAMINOPHEN 325 MG/1
325 TABLET, FILM COATED ORAL
Refills: 0 | Status: ACTIVE | COMMUNITY

## 2020-07-16 RX ORDER — SPIRONOLACTONE 50 MG/1
TABLET, FILM COATED ORAL
Refills: 0 | Status: ACTIVE | COMMUNITY

## 2020-07-16 NOTE — ASSESSMENT
[FreeTextEntry1] : 63 y/o gentleman with h/o L AKA about 20 years ago in [place] presents for evaluation of chronic ulcerations to right leg with leg swelling, denies any pain or h/o DVT.\par I performed a venous duplex which was medically necessary to evaluate for DVT. 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

## 2020-07-16 NOTE — DATA REVIEWED
[FreeTextEntry1] : I performed a venous duplex which was medically necessary to evaluate for DVT. 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

## 2020-07-16 NOTE — HISTORY OF PRESENT ILLNESS
[FreeTextEntry1] : 63 y/o gentleman with h/o L AKA about 20 years ago in [place] presents for evaluation of chronic ulcerations to right leg with leg swelling, denies any pain or h/o DVT.

## 2020-07-23 ENCOUNTER — APPOINTMENT (OUTPATIENT)
Dept: VASCULAR SURGERY | Facility: CLINIC | Age: 65
End: 2020-07-23
Payer: MEDICARE

## 2020-07-23 PROCEDURE — 29580 STRAPPING UNNA BOOT: CPT | Mod: RT

## 2020-07-23 NOTE — ASSESSMENT
[FreeTextEntry1] : 65 y/o gentleman with h/o L AKA about 20 years ago in [place], h/o RLE DVT, now with chronic ulcerations to right leg with leg swelling, improving on unna boot.\par I will continue with unna boot to the right leg to treat the ulcerations and he will follow up in one weeks time.

## 2020-07-23 NOTE — HISTORY OF PRESENT ILLNESS
[FreeTextEntry1] : 65 y/o gentleman with h/o L AKA about 20 years ago in [place], h/o RLE DVT, now with chronic ulcerations to right leg with leg swelling, improving on unna boot.

## 2020-07-30 ENCOUNTER — APPOINTMENT (OUTPATIENT)
Dept: VASCULAR SURGERY | Facility: CLINIC | Age: 65
End: 2020-07-30
Payer: MEDICARE

## 2020-07-30 PROCEDURE — 29580 STRAPPING UNNA BOOT: CPT | Mod: RT

## 2020-07-30 NOTE — HISTORY OF PRESENT ILLNESS
[FreeTextEntry1] : 63 y/o gentleman with h/o L AKA about 20 years ago in [place], h/o RLE DVT, now with chronic ulcerations to right leg with leg swelling, improving on unna boot.

## 2020-07-30 NOTE — ASSESSMENT
[FreeTextEntry1] : 63 y/o gentleman with h/o L AKA about 20 years ago in [place], h/o RLE DVT, now with chronic ulcerations to right leg with leg swelling, improving on unna boot.\par I will continue with unna boot to the right leg to treat the ulcerations and he will follow up in one weeks time.

## 2020-08-06 ENCOUNTER — APPOINTMENT (OUTPATIENT)
Dept: VASCULAR SURGERY | Facility: CLINIC | Age: 65
End: 2020-08-06
Payer: MEDICARE

## 2020-08-06 VITALS
TEMPERATURE: 35.8 F | BODY MASS INDEX: 29.25 KG/M2 | WEIGHT: 193 LBS | HEART RATE: 73 BPM | DIASTOLIC BLOOD PRESSURE: 75 MMHG | HEIGHT: 68 IN | SYSTOLIC BLOOD PRESSURE: 135 MMHG

## 2020-08-06 PROCEDURE — 29580 STRAPPING UNNA BOOT: CPT | Mod: RT

## 2020-08-06 NOTE — HISTORY OF PRESENT ILLNESS
[FreeTextEntry1] : 65 y/o gentleman with h/o L AKA about 20 years ago in [place], h/o RLE DVT, now with chronic ulcerations to right leg with leg swelling, improving on unna boot.

## 2020-08-06 NOTE — ASSESSMENT
[FreeTextEntry1] : 63 y/o gentleman with h/o L AKA about 20 years ago in [place], h/o RLE DVT, now with chronic ulcerations to right leg with leg swelling, improving on unna boot.\par I will continue with unna boot to the right leg to treat the ulcerations and he will follow up in one weeks time.

## 2020-08-13 ENCOUNTER — APPOINTMENT (OUTPATIENT)
Dept: VASCULAR SURGERY | Facility: CLINIC | Age: 65
End: 2020-08-13
Payer: MEDICARE

## 2020-08-13 PROCEDURE — 29580 STRAPPING UNNA BOOT: CPT | Mod: RT

## 2020-08-13 NOTE — HISTORY OF PRESENT ILLNESS
[FreeTextEntry1] : 65 y/o gentleman with h/o L AKA about 20 years ago in [place], h/o RLE DVT, now with chronic ulcerations to right leg with leg swelling, improving on unna boot.

## 2020-08-13 NOTE — ASSESSMENT
[FreeTextEntry1] : 65 y/o gentleman with h/o L AKA about 20 years ago in [place], h/o RLE DVT, now with chronic ulcerations to right leg with leg swelling, improving on unna boot.\par I will continue with unna boot to the right leg to treat the ulcerations and he will follow up in one weeks time.

## 2020-08-20 ENCOUNTER — APPOINTMENT (OUTPATIENT)
Dept: VASCULAR SURGERY | Facility: CLINIC | Age: 65
End: 2020-08-20
Payer: MEDICARE

## 2020-08-20 PROCEDURE — 29580 STRAPPING UNNA BOOT: CPT | Mod: RT

## 2020-08-20 NOTE — ASSESSMENT
[FreeTextEntry1] : 65 y/o gentleman with h/o L AKA about 20 years ago in [place], h/o RLE DVT, now with chronic ulcerations to right leg with leg swelling, improving on unna boot.\par I will continue with unna boot to the right leg to treat the ulcerations and he will follow up in one weeks time.

## 2020-08-20 NOTE — HISTORY OF PRESENT ILLNESS
[FreeTextEntry1] : 65 y/o gentleman with h/o L AKA about 20 years ago in [place], h/o RLE DVT, now with chronic ulcerations to right leg with leg swelling, improving on unna boot.

## 2020-08-27 ENCOUNTER — APPOINTMENT (OUTPATIENT)
Dept: VASCULAR SURGERY | Facility: CLINIC | Age: 65
End: 2020-08-27
Payer: MEDICARE

## 2020-08-27 PROCEDURE — 29580 STRAPPING UNNA BOOT: CPT | Mod: RT

## 2020-08-27 NOTE — HISTORY OF PRESENT ILLNESS
[FreeTextEntry1] : 65 y/o gentleman with h/o L AKA about 20 years ago in [place], h/o RLE DVT, now with chronic ulcerations to right leg with leg swelling, improving on unna boot.

## 2020-09-03 ENCOUNTER — APPOINTMENT (OUTPATIENT)
Dept: VASCULAR SURGERY | Facility: CLINIC | Age: 65
End: 2020-09-03
Payer: MEDICARE

## 2020-09-03 PROCEDURE — 29580 STRAPPING UNNA BOOT: CPT | Mod: RT

## 2020-09-03 NOTE — ASSESSMENT
[FreeTextEntry1] : 65 y/o gentleman with h/o L AKA about 20 years ago in [place], h/o RLE DVT, now with chronic ulcerations to right leg with leg swelling, improving on unna boot.\par I will continue with unna boot to the right leg to treat the ulcerations and he will follow up in one weeks time.

## 2020-09-03 NOTE — HISTORY OF PRESENT ILLNESS
[FreeTextEntry1] : 63 y/o gentleman with h/o L AKA about 20 years ago in [place], h/o RLE DVT, now with chronic ulcerations to right leg with leg swelling, improving on unna boot.

## 2020-09-10 ENCOUNTER — APPOINTMENT (OUTPATIENT)
Dept: VASCULAR SURGERY | Facility: CLINIC | Age: 65
End: 2020-09-10
Payer: MEDICARE

## 2020-09-10 PROCEDURE — 29580 STRAPPING UNNA BOOT: CPT | Mod: RT

## 2020-09-10 NOTE — ASSESSMENT
[FreeTextEntry1] : 63 y/o gentleman with h/o L AKA about 20 years ago in [place], h/o RLE DVT, now with chronic ulcerations to right leg with leg swelling, improving on unna boot.\par I will continue with unna boot to the right leg to treat the ulcerations and he will follow up in one weeks time.

## 2020-09-10 NOTE — HISTORY OF PRESENT ILLNESS
[FreeTextEntry1] : 63 y/o gentleman with h/o L AKA about 20 years ago in [place], h/o RLE DVT, now with chronic ulcerations to right leg with leg swelling, improving on unna boot.

## 2020-09-17 ENCOUNTER — APPOINTMENT (OUTPATIENT)
Dept: VASCULAR SURGERY | Facility: CLINIC | Age: 65
End: 2020-09-17
Payer: MEDICARE

## 2020-09-17 PROCEDURE — 29580 STRAPPING UNNA BOOT: CPT | Mod: RT

## 2020-09-17 NOTE — ASSESSMENT
[FreeTextEntry1] : 63 y/o gentleman with h/o L AKA about 20 years ago in [place], h/o RLE DVT, now with chronic ulcerations to right leg with leg swelling, improving on unna boot.\par I will continue with unna boot to the right leg to treat the ulcerations and he will follow up in one weeks time.

## 2020-09-17 NOTE — HISTORY OF PRESENT ILLNESS
[FreeTextEntry1] : 63 y/o gentleman with h/o L AKA about 20 years ago in [place], h/o RLE DVT, now with chronic ulcerations to right leg with leg swelling, improving on unna boot.

## 2020-09-24 ENCOUNTER — APPOINTMENT (OUTPATIENT)
Dept: VASCULAR SURGERY | Facility: CLINIC | Age: 65
End: 2020-09-24
Payer: MEDICARE

## 2020-09-24 PROCEDURE — 29580 STRAPPING UNNA BOOT: CPT | Mod: RT

## 2020-09-24 NOTE — HISTORY OF PRESENT ILLNESS
[FreeTextEntry1] : 65 y/o gentleman with h/o L AKA about 20 years ago in [place], h/o RLE DVT, now with chronic ulcerations to right leg with leg swelling, improving on unna boot.

## 2020-09-24 NOTE — ASSESSMENT
[FreeTextEntry1] : 63 y/o gentleman with h/o L AKA about 20 years ago in [place], h/o RLE DVT, now with chronic ulcerations to right leg with leg swelling, improving on unna boot.\par I will continue with unna boot to the right leg to treat the ulcerations and he will follow up in one weeks time.

## 2020-10-01 ENCOUNTER — APPOINTMENT (OUTPATIENT)
Dept: VASCULAR SURGERY | Facility: CLINIC | Age: 65
End: 2020-10-01
Payer: MEDICARE

## 2020-10-01 PROCEDURE — 29580 STRAPPING UNNA BOOT: CPT | Mod: RT

## 2020-10-01 NOTE — ASSESSMENT
[FreeTextEntry1] : 63 y/o gentleman with h/o L AKA about 20 years ago in [place], h/o RLE DVT, now with chronic ulcerations to right leg with leg swelling, improving on unna boot.\par I will continue with unna boot to the right leg to treat the ulcerations and he will follow up in one weeks time.

## 2020-10-01 NOTE — HISTORY OF PRESENT ILLNESS
[FreeTextEntry1] : 65 y/o gentleman with h/o L AKA about 20 years ago in [place], h/o RLE DVT, now with chronic ulcerations to right leg with leg swelling, improving on unna boot.

## 2020-10-08 ENCOUNTER — APPOINTMENT (OUTPATIENT)
Dept: VASCULAR SURGERY | Facility: CLINIC | Age: 65
End: 2020-10-08
Payer: MEDICARE

## 2020-10-08 PROCEDURE — 29580 STRAPPING UNNA BOOT: CPT | Mod: RT

## 2020-10-08 NOTE — HISTORY OF PRESENT ILLNESS
[FreeTextEntry1] : 63 y/o gentleman with h/o L AKA about 20 years ago in [place], h/o RLE DVT, now with chronic ulcerations to right leg with leg swelling, improving on unna boot.

## 2020-10-08 NOTE — ASSESSMENT
[FreeTextEntry1] : 63 y/o gentleman with h/o L AKA about 20 years ago in [place], h/o RLE DVT, now with chronic ulcerations to right leg with leg swelling, improving on unna boot.\par I will continue with unna boot to the right leg to treat the ulcerations and he will follow up in one weeks time.

## 2020-10-15 ENCOUNTER — APPOINTMENT (OUTPATIENT)
Dept: VASCULAR SURGERY | Facility: CLINIC | Age: 65
End: 2020-10-15
Payer: MEDICARE

## 2020-10-15 PROCEDURE — 29580 STRAPPING UNNA BOOT: CPT | Mod: RT

## 2020-10-15 NOTE — ASSESSMENT
[FreeTextEntry1] : 64 y/o gentleman with h/o L AKA about 20 years ago in [place], h/o RLE DVT, now with chronic ulcerations to right leg with leg swelling, improving on unna boot.\par I will continue with unna boot to the right leg to treat the ulcerations and he will follow up in one weeks time.

## 2020-10-15 NOTE — HISTORY OF PRESENT ILLNESS
[FreeTextEntry1] : 66 y/o gentleman with h/o L AKA about 20 years ago in [place], h/o RLE DVT, now with chronic ulcerations to right leg with leg swelling, improving on unna boot.

## 2020-10-22 ENCOUNTER — APPOINTMENT (OUTPATIENT)
Dept: VASCULAR SURGERY | Facility: CLINIC | Age: 65
End: 2020-10-22
Payer: MEDICARE

## 2020-10-22 PROCEDURE — 29580 STRAPPING UNNA BOOT: CPT | Mod: RT

## 2020-10-22 NOTE — ASSESSMENT
[FreeTextEntry1] : 66 y/o gentleman with h/o L AKA about 20 years ago in [place], h/o RLE DVT, now with chronic ulcerations to right leg with leg swelling, improving on unna boot.\par I will continue with unna boot to the right leg to treat the ulcerations and he will follow up in one weeks time.

## 2020-10-22 NOTE — HISTORY OF PRESENT ILLNESS
[FreeTextEntry1] : 66 y/o gentleman with h/o L AKA about 20 years ago in [place], h/o RLE DVT, now with chronic ulcerations to right leg with leg swelling, improving on unna boot.

## 2020-10-29 ENCOUNTER — APPOINTMENT (OUTPATIENT)
Dept: VASCULAR SURGERY | Facility: CLINIC | Age: 65
End: 2020-10-29
Payer: MEDICARE

## 2020-10-29 VITALS — TEMPERATURE: 98.4 F

## 2020-10-29 PROCEDURE — 29580 STRAPPING UNNA BOOT: CPT | Mod: RT

## 2020-10-29 NOTE — HISTORY OF PRESENT ILLNESS
[FreeTextEntry1] : 66 y/o gentleman with h/o L AKA about 20 years ago in [place], h/o RLE DVT, now with chronic ulcerations to right leg with leg swelling, improving on unna boot.

## 2020-10-29 NOTE — ASSESSMENT
[FreeTextEntry1] : 64 y/o gentleman with h/o L AKA about 20 years ago in [place], h/o RLE DVT, now with chronic ulcerations to right leg with leg swelling, improving on unna boot.\par I will continue with unna boot to the right leg to treat the ulcerations and he will follow up in one weeks time.

## 2020-11-05 ENCOUNTER — APPOINTMENT (OUTPATIENT)
Dept: VASCULAR SURGERY | Facility: CLINIC | Age: 65
End: 2020-11-05
Payer: MEDICARE

## 2020-11-05 PROCEDURE — 99212 OFFICE O/P EST SF 10 MIN: CPT

## 2020-11-05 NOTE — HISTORY OF PRESENT ILLNESS
[FreeTextEntry1] : 64 y/o gentleman with h/o L AKA about 20 years ago in [place], h/o RLE DVT, now with chronic ulcerations to right leg with leg swelling, improving on unna boot.

## 2020-11-05 NOTE — ASSESSMENT
[FreeTextEntry1] : 66 y/o gentleman with h/o L AKA about 20 years ago in [place], h/o RLE DVT, now with chronic ulcerations to right leg with leg swelling, improving on unna boot.\par The right leg venous ulcers have healed, but now with macerated skin to dorsum of right foot and medial malleolar region for which I recommend Betadine paint and ace bandage daily and he will follow up in one weeks time.

## 2020-11-12 ENCOUNTER — APPOINTMENT (OUTPATIENT)
Dept: VASCULAR SURGERY | Facility: CLINIC | Age: 65
End: 2020-11-12

## 2020-11-19 ENCOUNTER — APPOINTMENT (OUTPATIENT)
Dept: VASCULAR SURGERY | Facility: CLINIC | Age: 65
End: 2020-11-19
Payer: MEDICARE

## 2020-11-19 PROCEDURE — 99212 OFFICE O/P EST SF 10 MIN: CPT

## 2020-11-19 NOTE — HISTORY OF PRESENT ILLNESS
[FreeTextEntry1] : 64 y/o gentleman with h/o L AKA about 20 years ago in [place], h/o RLE DVT, with chronic ulcerations to right leg that healed with unna boot, but had superficial wounds on the right foot and was advised to do daily dressings changes with Betadine.

## 2020-11-19 NOTE — ASSESSMENT
[FreeTextEntry1] : 64 y/o gentleman with h/o L AKA about 20 years ago in [place], h/o RLE DVT, now with chronic ulcerations to right leg with leg swelling, improving on unna boot.\par The right leg venous ulcers have healed, but now with macerated skin to dorsum of right foot and medial malleolar region that are improving with Betadine paint and ace bandage daily and was advised to continue it and he will follow up in 3 weeks time.

## 2020-12-10 ENCOUNTER — APPOINTMENT (OUTPATIENT)
Dept: VASCULAR SURGERY | Facility: CLINIC | Age: 65
End: 2020-12-10
Payer: MEDICARE

## 2020-12-10 VITALS — TEMPERATURE: 98 F

## 2020-12-10 PROCEDURE — 99212 OFFICE O/P EST SF 10 MIN: CPT

## 2020-12-10 NOTE — HISTORY OF PRESENT ILLNESS
[FreeTextEntry1] : 64 y/o gentleman with h/o L AKA about 20 years ago in [place], h/o RLE DVT, with chronic ulcerations to right leg that healed with unna boot, but had superficial wounds on the right foot and was advised to do daily dressings changes with Betadine.

## 2020-12-10 NOTE — ASSESSMENT
[FreeTextEntry1] : 64 y/o gentleman with h/o L AKA about 20 years ago in [place], h/o RLE DVT, now with chronic ulcerations to right leg with leg swelling, improving on unna boot.\par The right leg venous ulcers have healed, but now with macerated skin to dorsum of right foot and medial malleolar region that are improving with Betadine paint and ace bandage daily and was advised to continue it and he will follow up in 3 months time.

## 2021-01-11 ENCOUNTER — APPOINTMENT (OUTPATIENT)
Dept: VASCULAR SURGERY | Facility: CLINIC | Age: 66
End: 2021-01-11
Payer: MEDICARE

## 2021-01-11 PROCEDURE — 29580 STRAPPING UNNA BOOT: CPT | Mod: RT

## 2021-01-11 NOTE — ASSESSMENT
[FreeTextEntry1] : 66 y/o gentleman with h/o L AKA about 20 years ago in [place], h/o RLE DVT, now with chronic ulcerations to right leg with leg swelling, improving on unna boot.\par The right leg venous ulcers have healed, but now with macerated skin to dorsum of right foot and medial malleolar region that are improving with Betadine paint and ace bandage daily and was advised to continue it and he will follow up in 3 months time.

## 2021-01-11 NOTE — HISTORY OF PRESENT ILLNESS
[FreeTextEntry1] : 64 y/o gentleman with h/o L AKA about 20 years ago in [place], h/o RLE DVT, with chronic ulcerations to right leg that healed with unna boot, but had superficial wounds on the right foot and was advised to do daily dressings changes with Betadine.

## 2021-01-19 ENCOUNTER — APPOINTMENT (OUTPATIENT)
Dept: VASCULAR SURGERY | Facility: CLINIC | Age: 66
End: 2021-01-19
Payer: MEDICARE

## 2021-01-19 VITALS — TEMPERATURE: 98.5 F

## 2021-01-19 PROCEDURE — 29580 STRAPPING UNNA BOOT: CPT | Mod: RT

## 2021-01-19 NOTE — HISTORY OF PRESENT ILLNESS
[FreeTextEntry1] : 64 y/o gentleman with h/o L AKA about 20 years ago in [place], h/o RLE DVT, with chronic ulcerations to right leg that healed with unna boot, but now has re occurred.\par

## 2021-01-19 NOTE — ASSESSMENT
[FreeTextEntry1] : 64 y/o gentleman with h/o L AKA about 20 years ago in [place], h/o RLE DVT, now with recurrent  venous ulcerations to right leg with leg swelling, improving on unna boot.\par Will continue with weekly unna boot and he will follow up in one weeks time.

## 2021-01-29 ENCOUNTER — APPOINTMENT (OUTPATIENT)
Dept: VASCULAR SURGERY | Facility: CLINIC | Age: 66
End: 2021-01-29
Payer: MEDICARE

## 2021-01-29 VITALS
HEIGHT: 68 IN | TEMPERATURE: 96.7 F | SYSTOLIC BLOOD PRESSURE: 138 MMHG | WEIGHT: 200 LBS | BODY MASS INDEX: 30.31 KG/M2 | DIASTOLIC BLOOD PRESSURE: 82 MMHG | HEART RATE: 76 BPM

## 2021-01-29 PROCEDURE — 29580 STRAPPING UNNA BOOT: CPT | Mod: RT

## 2021-01-29 NOTE — ASSESSMENT
[FreeTextEntry1] : 64 y/o gentleman with h/o L AKA about 20 years ago in [place], h/o RLE DVT, now with recurrent  venous ulcerations to right leg with leg swelling, improving on unna boot.\par Will continue with weekly unna boot and he will follow up in one weeks time.

## 2021-01-29 NOTE — HISTORY OF PRESENT ILLNESS
[FreeTextEntry1] : 66 y/o gentleman with h/o L AKA about 20 years ago in [place], h/o RLE DVT, with chronic ulcerations to right leg that healed with unna boot, but now has re occurred.\par

## 2021-02-05 ENCOUNTER — APPOINTMENT (OUTPATIENT)
Dept: VASCULAR SURGERY | Facility: CLINIC | Age: 66
End: 2021-02-05
Payer: MEDICARE

## 2021-02-05 VITALS
WEIGHT: 198 LBS | HEART RATE: 79 BPM | SYSTOLIC BLOOD PRESSURE: 135 MMHG | DIASTOLIC BLOOD PRESSURE: 82 MMHG | HEIGHT: 68 IN | TEMPERATURE: 97.2 F | BODY MASS INDEX: 30.01 KG/M2

## 2021-02-05 PROCEDURE — 99212 OFFICE O/P EST SF 10 MIN: CPT

## 2021-02-05 RX ORDER — GAUZE BANDAGE 4" X 4"
SPONGE TOPICAL
Qty: 30 | Refills: 2 | Status: ACTIVE | COMMUNITY
Start: 2021-02-05 | End: 1900-01-01

## 2021-02-05 RX ORDER — BISMUTH TRIBROMOPH/PETROLATUM 5"X9"
BANDAGE TOPICAL DAILY
Qty: 1 | Refills: 3 | Status: ACTIVE | COMMUNITY
Start: 2021-02-05 | End: 1900-01-01

## 2021-02-05 RX ORDER — SILVER SULFADIAZINE 10 MG/G
1 CREAM TOPICAL DAILY
Qty: 1 | Refills: 1 | Status: ACTIVE | COMMUNITY
Start: 2021-02-05 | End: 1900-01-01

## 2021-02-05 NOTE — HISTORY OF PRESENT ILLNESS
[FreeTextEntry1] : 64 y/o gentleman with h/o L AKA about 20 years ago in [place], h/o RLE DVT, with chronic ulcerations to right leg that healed with unna boot, but now has re occurred. C/o pain over the dorsum of right foot\par

## 2021-02-05 NOTE — ASSESSMENT
[FreeTextEntry1] : 64 y/o gentleman with h/o L AKA about 20 years ago in [place], h/o RLE DVT, now with recurrent  venous ulcerations to right leg with leg swelling.\par \par I recommend daily wound care with Silvadene to the medial malleolar region and Adaptic dressing and Xeroform dressing over the macerated area on the dorsum of the right foot.\par \par He will follow up in 2 weeks time.

## 2021-02-19 ENCOUNTER — APPOINTMENT (OUTPATIENT)
Dept: VASCULAR SURGERY | Facility: CLINIC | Age: 66
End: 2021-02-19

## 2021-02-19 ENCOUNTER — APPOINTMENT (OUTPATIENT)
Dept: VASCULAR SURGERY | Facility: CLINIC | Age: 66
End: 2021-02-19
Payer: MEDICARE

## 2021-02-19 PROCEDURE — 99212 OFFICE O/P EST SF 10 MIN: CPT

## 2021-02-25 NOTE — HISTORY OF PRESENT ILLNESS
[FreeTextEntry1] : 66 y/o gentleman with h/o L AKA about 20 years ago in [place], h/o RLE DVT, with chronic ulcerations to right leg that healed with unna boot, but now has re occurred. C/o pain over the dorsum of right foot\par

## 2021-02-25 NOTE — ASSESSMENT
[FreeTextEntry1] : 66 y/o gentleman with h/o L AKA about 20 years ago in [place], h/o RLE DVT, now with recurrent  venous ulcerations to right leg with leg swelling.\par \par I recommend daily wound care with Silvadene to the medial malleolar region and Adaptic dressing and Xeroform dressing over the macerated area on the dorsum of the right foot.\par \par He will follow up in 4 weeks time.

## 2021-03-19 ENCOUNTER — APPOINTMENT (OUTPATIENT)
Dept: VASCULAR SURGERY | Facility: CLINIC | Age: 66
End: 2021-03-19
Payer: MEDICARE

## 2021-03-19 PROCEDURE — 29580 STRAPPING UNNA BOOT: CPT | Mod: RT

## 2021-03-19 NOTE — HISTORY OF PRESENT ILLNESS
[FreeTextEntry1] : 64 y/o gentleman with h/o L AKA about 20 years ago in [place], h/o RLE DVT, with chronic ulcerations to right leg that healed with unna boot, but now has re occurred. C/o pain over the dorsum of right foot\par

## 2021-03-19 NOTE — ASSESSMENT
[FreeTextEntry1] : 64 y/o gentleman with h/o L AKA about 20 years ago in [place], h/o RLE DVT, now with recurrent  venous ulcerations to right leg with leg swelling.\par \par I recommend unna boot to right leg and followup in one week.\par \par

## 2021-03-26 ENCOUNTER — APPOINTMENT (OUTPATIENT)
Dept: VASCULAR SURGERY | Facility: CLINIC | Age: 66
End: 2021-03-26

## 2021-04-05 ENCOUNTER — APPOINTMENT (OUTPATIENT)
Dept: VASCULAR SURGERY | Facility: CLINIC | Age: 66
End: 2021-04-05

## 2021-04-06 ENCOUNTER — APPOINTMENT (OUTPATIENT)
Dept: VASCULAR SURGERY | Facility: CLINIC | Age: 66
End: 2021-04-06

## 2021-04-06 ENCOUNTER — APPOINTMENT (OUTPATIENT)
Dept: VASCULAR SURGERY | Facility: CLINIC | Age: 66
End: 2021-04-06
Payer: MEDICARE

## 2021-04-06 PROCEDURE — 99212 OFFICE O/P EST SF 10 MIN: CPT

## 2021-04-06 NOTE — HISTORY OF PRESENT ILLNESS
[FreeTextEntry1] : 66 y/o gentleman with h/o L AKA about 20 years ago in [place], h/o RLE DVT, with chronic ulcerations to right leg that healed with unna boot, but now has re occurred. C/o pain over the dorsum of right foot\par

## 2021-04-06 NOTE — ASSESSMENT
[FreeTextEntry1] : 64 y/o gentleman with h/o L AKA about 20 years ago in [place], h/o RLE DVT, now with recurrent  venous ulcerations to right leg with leg swelling.\par \par I recommend continue with local wound care and follow up in 1 month.\par \par

## 2021-04-12 ENCOUNTER — APPOINTMENT (OUTPATIENT)
Dept: VASCULAR SURGERY | Facility: CLINIC | Age: 66
End: 2021-04-12
Payer: MEDICARE

## 2021-04-12 PROCEDURE — 99212 OFFICE O/P EST SF 10 MIN: CPT

## 2021-04-12 NOTE — HISTORY OF PRESENT ILLNESS
[FreeTextEntry1] : 64 y/o gentleman with h/o L AKA about 20 years ago in [place], h/o RLE DVT, with chronic ulcerations to right leg that healed with unna boot, but now has reoccurred.\par

## 2021-04-12 NOTE — ASSESSMENT
[FreeTextEntry1] : 64 y/o gentleman with h/o L AKA about 20 years ago in [place], h/o RLE DVT, now with recurrent  venous ulcerations to right leg with leg swelling.\par \par I recommend continue with local wound care and follow up in 1 month.\par \par

## 2021-05-11 ENCOUNTER — APPOINTMENT (OUTPATIENT)
Dept: VASCULAR SURGERY | Facility: CLINIC | Age: 66
End: 2021-05-11
Payer: MEDICARE

## 2021-05-11 PROCEDURE — 99212 OFFICE O/P EST SF 10 MIN: CPT

## 2021-05-11 NOTE — HISTORY OF PRESENT ILLNESS
[FreeTextEntry1] : 64 y/o gentleman with h/o L AKA about 20 years ago in [place], h/o RLE DVT, with chronic ulcerations to right leg that healed with unna boot, but now has reoccurred.\par

## 2021-05-11 NOTE — ASSESSMENT
[FreeTextEntry1] : 64 y/o gentleman with h/o L AKA about 20 years ago in [place], h/o RLE DVT, now with recurrent  venous ulcerations to right leg with leg swelling. his wounds are healing with local wound care \par \par I recommend continue with local wound care and follow up in 1 month.\par \par

## 2021-05-18 ENCOUNTER — APPOINTMENT (OUTPATIENT)
Dept: VASCULAR SURGERY | Facility: CLINIC | Age: 66
End: 2021-05-18
Payer: MEDICARE

## 2021-05-18 PROCEDURE — 99213 OFFICE O/P EST LOW 20 MIN: CPT

## 2021-05-18 NOTE — ASSESSMENT
[FreeTextEntry1] : 64 y/o gentleman with h/o L AKA about 20 years ago in [place], h/o RLE DVT, now with recurrent  venous ulcerations to right leg with leg swelling. \par \par I recommend continue with local wound care and follow up in 1 month.\par \par

## 2021-05-18 NOTE — HISTORY OF PRESENT ILLNESS
[FreeTextEntry1] : 66 y/o gentleman with h/o L AKA about 20 years ago in [place], h/o RLE DVT, with chronic ulcerations to right  that  has reoccurred. \par The patient has been receiving local wound care via visiting nurse.

## 2021-05-25 ENCOUNTER — APPOINTMENT (OUTPATIENT)
Dept: VASCULAR SURGERY | Facility: CLINIC | Age: 66
End: 2021-05-25
Payer: MEDICARE

## 2021-05-25 PROCEDURE — 99212 OFFICE O/P EST SF 10 MIN: CPT

## 2021-05-25 NOTE — ASSESSMENT
[FreeTextEntry1] : 66 y/o gentleman with h/o L AKA about 20 years ago in [place], h/o RLE DVT, now with recurrent  venous ulcerations to right leg with leg swelling. \par The patient has green copious drainage from the wound which smells like pseudomonas. I recommend changing his wound care to Dakins solution daily and I will see him back in my office in one week\par \par \par

## 2021-05-25 NOTE — HISTORY OF PRESENT ILLNESS
[FreeTextEntry1] : 66 y/o gentleman with h/o L AKA about 20 years ago in [place], h/o RLE DVT, with chronic ulcerations to right  that  has reoccurred. \par The patient has been receiving local wound care via visiting nurse.

## 2021-06-01 ENCOUNTER — APPOINTMENT (OUTPATIENT)
Dept: VASCULAR SURGERY | Facility: CLINIC | Age: 66
End: 2021-06-01
Payer: MEDICARE

## 2021-06-01 PROCEDURE — 99212 OFFICE O/P EST SF 10 MIN: CPT

## 2021-06-01 NOTE — ASSESSMENT
[FreeTextEntry1] : 64 y/o gentleman with h/o L AKA about 20 years ago in [place], h/o RLE DVT, now with recurrent  venous ulcerations to right leg with leg swelling. \par The patient has green copious drainage from the wound which smells like pseudomonas. I recommend changing his wound care to Dakins solution daily and I will see him back in my office in one week\par \par \par

## 2021-06-01 NOTE — HISTORY OF PRESENT ILLNESS
[FreeTextEntry1] : 64 y/o gentleman with h/o L AKA about 20 years ago in [place], h/o RLE DVT, with chronic ulcerations to right  that  has reoccurred. \par The patient has been receiving local wound care via visiting nurse.

## 2021-06-15 ENCOUNTER — APPOINTMENT (OUTPATIENT)
Dept: VASCULAR SURGERY | Facility: CLINIC | Age: 66
End: 2021-06-15
Payer: MEDICARE

## 2021-06-15 PROCEDURE — 99213 OFFICE O/P EST LOW 20 MIN: CPT

## 2021-06-15 NOTE — HISTORY OF PRESENT ILLNESS
[FreeTextEntry1] : 64 y/o gentleman with h/o L AKA about 20 years ago in [place], h/o RLE DVT, with chronic ulcerations to right  that  has reoccurred. \par The patient has been receiving local wound care via visiting nurse.

## 2021-06-15 NOTE — ASSESSMENT
[FreeTextEntry1] : 66 y/o gentleman with h/o L AKA about 20 years ago in [place], h/o RLE DVT, now with recurrent  venous ulcerations to right leg with leg swelling. \par The patient has green copious drainage from the wound which smells like pseudomonas. I recommend changing his wound care to Actisorb or silver alginate to treat his significant weeping edema.\par \par

## 2021-06-22 ENCOUNTER — INPATIENT (INPATIENT)
Facility: HOSPITAL | Age: 66
LOS: 2 days | Discharge: HOME | End: 2021-06-25
Attending: INTERNAL MEDICINE | Admitting: INTERNAL MEDICINE
Payer: MEDICARE

## 2021-06-22 VITALS
RESPIRATION RATE: 18 BRPM | HEIGHT: 64 IN | WEIGHT: 231.93 LBS | TEMPERATURE: 102 F | SYSTOLIC BLOOD PRESSURE: 129 MMHG | DIASTOLIC BLOOD PRESSURE: 60 MMHG | HEART RATE: 117 BPM | OXYGEN SATURATION: 97 %

## 2021-06-22 DIAGNOSIS — Z89.512 ACQUIRED ABSENCE OF LEFT LEG BELOW KNEE: Chronic | ICD-10-CM

## 2021-06-22 DIAGNOSIS — I87.2 VENOUS INSUFFICIENCY (CHRONIC) (PERIPHERAL): ICD-10-CM

## 2021-06-22 LAB
ANION GAP SERPL CALC-SCNC: 9 MMOL/L — SIGNIFICANT CHANGE UP (ref 7–14)
BASOPHILS # BLD AUTO: 0.04 K/UL — SIGNIFICANT CHANGE UP (ref 0–0.2)
BASOPHILS NFR BLD AUTO: 0.2 % — SIGNIFICANT CHANGE UP (ref 0–1)
BUN SERPL-MCNC: 15 MG/DL — SIGNIFICANT CHANGE UP (ref 10–20)
CALCIUM SERPL-MCNC: 8.4 MG/DL — LOW (ref 8.5–10.1)
CHLORIDE SERPL-SCNC: 96 MMOL/L — LOW (ref 98–110)
CO2 SERPL-SCNC: 24 MMOL/L — SIGNIFICANT CHANGE UP (ref 17–32)
CREAT SERPL-MCNC: 1.2 MG/DL — SIGNIFICANT CHANGE UP (ref 0.7–1.5)
EOSINOPHIL # BLD AUTO: 0 K/UL — SIGNIFICANT CHANGE UP (ref 0–0.7)
EOSINOPHIL NFR BLD AUTO: 0 % — SIGNIFICANT CHANGE UP (ref 0–8)
GLUCOSE SERPL-MCNC: 103 MG/DL — HIGH (ref 70–99)
HCT VFR BLD CALC: 38.5 % — LOW (ref 42–52)
HGB BLD-MCNC: 14.2 G/DL — SIGNIFICANT CHANGE UP (ref 14–18)
IMM GRANULOCYTES NFR BLD AUTO: 2 % — HIGH (ref 0.1–0.3)
LACTATE SERPL-SCNC: 2.4 MMOL/L — HIGH (ref 0.7–2)
LYMPHOCYTES # BLD AUTO: 0.41 K/UL — LOW (ref 1.2–3.4)
LYMPHOCYTES # BLD AUTO: 1.7 % — LOW (ref 20.5–51.1)
MCHC RBC-ENTMCNC: 36.4 PG — HIGH (ref 27–31)
MCHC RBC-ENTMCNC: 36.9 G/DL — SIGNIFICANT CHANGE UP (ref 32–37)
MCV RBC AUTO: 98.7 FL — HIGH (ref 80–94)
MONOCYTES # BLD AUTO: 0.65 K/UL — HIGH (ref 0.1–0.6)
MONOCYTES NFR BLD AUTO: 2.6 % — SIGNIFICANT CHANGE UP (ref 1.7–9.3)
NEUTROPHILS # BLD AUTO: 23.06 K/UL — HIGH (ref 1.4–6.5)
NEUTROPHILS NFR BLD AUTO: 93.5 % — HIGH (ref 42.2–75.2)
NRBC # BLD: 0 /100 WBCS — SIGNIFICANT CHANGE UP (ref 0–0)
PLATELET # BLD AUTO: 104 K/UL — LOW (ref 130–400)
POTASSIUM SERPL-MCNC: 4.4 MMOL/L — SIGNIFICANT CHANGE UP (ref 3.5–5)
POTASSIUM SERPL-SCNC: 4.4 MMOL/L — SIGNIFICANT CHANGE UP (ref 3.5–5)
RBC # BLD: 3.9 M/UL — LOW (ref 4.7–6.1)
RBC # FLD: 12.8 % — SIGNIFICANT CHANGE UP (ref 11.5–14.5)
SARS-COV-2 RNA SPEC QL NAA+PROBE: SIGNIFICANT CHANGE UP
SODIUM SERPL-SCNC: 129 MMOL/L — LOW (ref 135–146)
WBC # BLD: 24.66 K/UL — HIGH (ref 4.8–10.8)
WBC # FLD AUTO: 24.66 K/UL — HIGH (ref 4.8–10.8)

## 2021-06-22 PROCEDURE — 99285 EMERGENCY DEPT VISIT HI MDM: CPT | Mod: CS

## 2021-06-22 PROCEDURE — 99221 1ST HOSP IP/OBS SF/LOW 40: CPT

## 2021-06-22 PROCEDURE — 73590 X-RAY EXAM OF LOWER LEG: CPT | Mod: 26,RT

## 2021-06-22 RX ORDER — METRONIDAZOLE 500 MG
500 TABLET ORAL ONCE
Refills: 0 | Status: COMPLETED | OUTPATIENT
Start: 2021-06-22 | End: 2021-06-22

## 2021-06-22 RX ORDER — SODIUM CHLORIDE 9 MG/ML
500 INJECTION, SOLUTION INTRAVENOUS ONCE
Refills: 0 | Status: COMPLETED | OUTPATIENT
Start: 2021-06-22 | End: 2021-06-22

## 2021-06-22 RX ORDER — CEFEPIME 1 G/1
2000 INJECTION, POWDER, FOR SOLUTION INTRAMUSCULAR; INTRAVENOUS ONCE
Refills: 0 | Status: COMPLETED | OUTPATIENT
Start: 2021-06-22 | End: 2021-06-22

## 2021-06-22 RX ORDER — VANCOMYCIN HCL 1 G
1000 VIAL (EA) INTRAVENOUS ONCE
Refills: 0 | Status: COMPLETED | OUTPATIENT
Start: 2021-06-22 | End: 2021-06-22

## 2021-06-22 RX ORDER — ACETAMINOPHEN 500 MG
975 TABLET ORAL ONCE
Refills: 0 | Status: COMPLETED | OUTPATIENT
Start: 2021-06-22 | End: 2021-06-22

## 2021-06-22 RX ORDER — SODIUM CHLORIDE 9 MG/ML
1000 INJECTION, SOLUTION INTRAVENOUS ONCE
Refills: 0 | Status: COMPLETED | OUTPATIENT
Start: 2021-06-22 | End: 2021-06-22

## 2021-06-22 RX ADMIN — Medication 975 MILLIGRAM(S): at 19:56

## 2021-06-22 RX ADMIN — CEFEPIME 100 MILLIGRAM(S): 1 INJECTION, POWDER, FOR SOLUTION INTRAMUSCULAR; INTRAVENOUS at 19:56

## 2021-06-22 RX ADMIN — SODIUM CHLORIDE 1000 MILLILITER(S): 9 INJECTION, SOLUTION INTRAVENOUS at 21:56

## 2021-06-22 RX ADMIN — Medication 250 MILLIGRAM(S): at 21:18

## 2021-06-22 RX ADMIN — SODIUM CHLORIDE 500 MILLILITER(S): 9 INJECTION, SOLUTION INTRAVENOUS at 19:56

## 2021-06-22 RX ADMIN — Medication 100 MILLIGRAM(S): at 20:47

## 2021-06-22 NOTE — ED PROVIDER NOTE - OBJECTIVE STATEMENT
pt presents to ED c/o fever. Denies chill/HA/dizziness/chest pain/palpitation/sob/abd pain/n/v/d/ black stool/bloody stool/urinary sxs

## 2021-06-22 NOTE — ED PROVIDER NOTE - PHYSICAL EXAMINATION
CONSTITUTIONAL: Well-appearing; well-nourished; in no apparent distress.   CARDIOVASCULAR: Normal S1, S2; no murmurs, rubs, or gallops.   RESPIRATORY: Normal chest excursion with respiration; breath sounds clear and equal bilaterally; no wheezes, rhonchi, or rales.  GI/: non-distended; non-tender; no palpable organomegaly.   MS: left AKA, right leg as described below but +distal pulses.   SKIN: RLE chronic venous stasis ulcers with erythema, foul odor and purulent DC; otherwise normal for age and race; warm; dry; good turgor; no apparent lesions or exudate.   NEURO/PSYCH: A & O x 4; grossly unremarkable. mood and manner are appropriate.

## 2021-06-22 NOTE — ED PROVIDER NOTE - NS ED ROS FT
Constitutional: no chills, no recent weight loss, change in appetite or malaise  Eyes: no redness/discharge/pain/vision changes  ENT: no rhinorrhea/ear pain/sore throat  Cardiac: No chest pain, SOB or edema.  Respiratory: No cough or respiratory distress  GI: No nausea, vomiting, diarrhea or abdominal pain.  : No dysuria, frequency, urgency or hematuria  MS: no pain to back or extremities, no loss of ROM, no weakness  Neuro: No headache or weakness. No LOC  Except as documented in the HPI, all other systems are negative.

## 2021-06-22 NOTE — CONSULT NOTE ADULT - SUBJECTIVE AND OBJECTIVE BOX
SUBJECTIVE:    Patient is a 65y old Male pmhx HTN, GERD, RLE edema, s/p L AKA who presents with a chief complaint of chronic venous stasis ulcers to right lower extremity with infection. Patient reports only mild pain with wound care. Denies fever, chills, night sweats.       PAST MEDICAL & SURGICAL HISTORY  Hypertension    GERD (gastroesophageal reflux disease)    Edema    Circulation disorder of lower extremity  left lower extremity    S/P BKA (below knee amputation), left      SOCIAL HISTORY:  Negative for smoking/alcohol/drug use.     ALLERGIES:  No Known Allergies    MEDICATIONS:  STANDING MEDICATIONS  lactated ringers Bolus 1000 milliLiter(s) IV Bolus once  silver sulfADIAZINE 1% Cream 1 Application(s) Topical two times a day    PRN MEDICATIONS    VITALS:   T(F): 100.8  HR: 104  BP: 93/52  RR: 18  SpO2: 97%    LABS:                        14.2   24.66 )-----------( 104      ( 22 Jun 2021 19:40 )             38.5     06-22    129<L>  |  96<L>  |  15  ----------------------------<  103<H>  4.4   |  24  |  1.2    Ca    8.4<L>      22 Jun 2021 19:40            Lactate, Blood: 2.4 mmol/L *H* (06-22-21 @ 19:40)          RADIOLOGY:    PHYSICAL EXAM:  GEN: No acute distress  LUNGS: Clear to auscultation bilaterally   HEART: S1/S2 present. RRR.   ABD: Soft, non-tender, non-distended. Bowel sounds present  EXT: NC/NC/NE/2+PP/[place]  NEURO: AAOX3  SKIN: RLE deep ulceration 2/2 chronic venous stasis with surrounding erythema and moderate edema extending below the ankle to foot. L- AKA   SUBJECTIVE:    64yo M w pmhx of HTN, s/p L AKA presents from [facility] for fevers w/ a chief complaint of chronic venous stasis ulcers to right lower extremity with infection. . There was concern at the facility that patient has cellulitis of RLE. Patient with no current complaints at this time. ROS neg.    In the ED, T102.3, /60, , RR18 w SpO2 975 on RA. Labs significant for wbc 24.66, Na 129 and lactate 2.9. Burn consulted - exam showed RLE chronic venous statis ulcer w surrounding erythema and edema. Patient s/p vanc, cefepime and flagyl x1;  started on IVF and admitted for RLE cellulitis.       PAST MEDICAL & SURGICAL HISTORY  Hypertension    GERD (gastroesophageal reflux disease)    Edema    Circulation disorder of lower extremity  left lower extremity    S/P BKA (below knee amputation), left      SOCIAL HISTORY:  Negative for smoking/alcohol/drug use.     ALLERGIES:  No Known Allergies    MEDICATIONS:  STANDING MEDICATIONS  lactated ringers Bolus 1000 milliLiter(s) IV Bolus once  silver sulfADIAZINE 1% Cream 1 Application(s) Topical two times a day    PRN MEDICATIONS    VITALS:   T(F): 100.8  HR: 104  BP: 93/52  RR: 18  SpO2: 97%    LABS:                        14.2   24.66 )-----------( 104      ( 22 Jun 2021 19:40 )             38.5     06-22    129<L>  |  96<L>  |  15  ----------------------------<  103<H>  4.4   |  24  |  1.2    Ca    8.4<L>      22 Jun 2021 19:40            Lactate, Blood: 2.4 mmol/L *H* (06-22-21 @ 19:40)          RADIOLOGY:    PHYSICAL EXAM:  GEN: No acute distress  LUNGS: Clear to auscultation bilaterally   HEART: S1/S2 present. RRR.   ABD: Soft, non-tender, non-distended. Bowel sounds present  EXT: NC/NC/NE/2+PP/[place]  NEURO: AAOX3  SKIN: RLE deep ulceration 2/2 chronic venous stasis with surrounding erythema and moderate edema extending below the ankle to foot. L- AKA

## 2021-06-22 NOTE — ED PROVIDER NOTE - CLINICAL SUMMARY MEDICAL DECISION MAKING FREE TEXT BOX
In the ED, T102.3, /60, , RR18 w SpO2 975 on RA. Labs significant for wbc 24.66, Na 129 and lactate 2.9. Burn consulted - exam showed RLE chronic venous statis ulcer w surrounding erythema and edema. Patient s/p vanc, cefepime and flagyl x1;  started on IVF and admitted for RLE cellulitis

## 2021-06-22 NOTE — ED ADULT NURSE NOTE - OBJECTIVE STATEMENT
Patient brought in from [facility] for fever (TMax 102) patient has right leg wound in which he follows up with vascular for. On assessment dressing drt and intact- no bleeding noted. Patient has Left AKA. Denies cough/SOB/CP at this time.

## 2021-06-22 NOTE — ED PROVIDER NOTE - ATTENDING CONTRIBUTION TO CARE
64yo M w pmhx of HTN, s/p L AKA presents from [facility] for fevers. There was concern at the facility that patient has cellulitis of RLE. Patient with no current complaints at this time.  Denies fever or chills.  No numbness, tingling or weakness.  No CP/SOB.  PE:  agree with above.  A/P:  Cellulitis, likely Osteo.  Burn Consult, labs, meds and imaging.     In the ED, T102.3, /60, , RR18 w SpO2 975 on RA. Labs significant for wbc 24.66, Na 129 and lactate 2.9. Burn consulted - exam showed RLE chronic venous statis ulcer w surrounding erythema and edema. Patient s/p vanc, cefepime and flagyl x1;  started on IVF and admitted for RLE cellulitis

## 2021-06-22 NOTE — ED PROVIDER NOTE - CARE PLAN
Principal Discharge DX:	Sepsis  Secondary Diagnosis:	Venous stasis ulcer  Secondary Diagnosis:	Cellulitis

## 2021-06-22 NOTE — CONSULT NOTE ADULT - PROBLEM SELECTOR RECOMMENDATION 9
-c/w SSD, [employer], kerlix and ACE wrap twice daily after wounds are cleaned with soap and water.  - WCx rec  - ABx rec  - po/iv pain meds

## 2021-06-23 LAB
A1C WITH ESTIMATED AVERAGE GLUCOSE RESULT: 4.8 % — SIGNIFICANT CHANGE UP (ref 4–5.6)
ALBUMIN SERPL ELPH-MCNC: 2.5 G/DL — LOW (ref 3.5–5.2)
ALP SERPL-CCNC: 106 U/L — SIGNIFICANT CHANGE UP (ref 30–115)
ALT FLD-CCNC: 34 U/L — SIGNIFICANT CHANGE UP (ref 0–41)
ANION GAP SERPL CALC-SCNC: 6 MMOL/L — LOW (ref 7–14)
ANION GAP SERPL CALC-SCNC: 8 MMOL/L — SIGNIFICANT CHANGE UP (ref 7–14)
AST SERPL-CCNC: 73 U/L — HIGH (ref 0–41)
BASOPHILS # BLD AUTO: 0.03 K/UL — SIGNIFICANT CHANGE UP (ref 0–0.2)
BASOPHILS NFR BLD AUTO: 0.1 % — SIGNIFICANT CHANGE UP (ref 0–1)
BILIRUB SERPL-MCNC: 2.8 MG/DL — HIGH (ref 0.2–1.2)
BUN SERPL-MCNC: 17 MG/DL — SIGNIFICANT CHANGE UP (ref 10–20)
BUN SERPL-MCNC: 19 MG/DL — SIGNIFICANT CHANGE UP (ref 10–20)
CALCIUM SERPL-MCNC: 7.3 MG/DL — LOW (ref 8.5–10.1)
CALCIUM SERPL-MCNC: 7.5 MG/DL — LOW (ref 8.5–10.1)
CHLORIDE SERPL-SCNC: 102 MMOL/L — SIGNIFICANT CHANGE UP (ref 98–110)
CHLORIDE SERPL-SCNC: 98 MMOL/L — SIGNIFICANT CHANGE UP (ref 98–110)
CHOLEST SERPL-MCNC: 93 MG/DL — SIGNIFICANT CHANGE UP
CO2 SERPL-SCNC: 21 MMOL/L — SIGNIFICANT CHANGE UP (ref 17–32)
CO2 SERPL-SCNC: 23 MMOL/L — SIGNIFICANT CHANGE UP (ref 17–32)
COVID-19 SPIKE DOMAIN AB INTERP: POSITIVE
COVID-19 SPIKE DOMAIN ANTIBODY RESULT: >250 U/ML — HIGH
CREAT SERPL-MCNC: 0.9 MG/DL — SIGNIFICANT CHANGE UP (ref 0.7–1.5)
CREAT SERPL-MCNC: 1 MG/DL — SIGNIFICANT CHANGE UP (ref 0.7–1.5)
EOSINOPHIL # BLD AUTO: 0 K/UL — SIGNIFICANT CHANGE UP (ref 0–0.7)
EOSINOPHIL NFR BLD AUTO: 0 % — SIGNIFICANT CHANGE UP (ref 0–8)
ERYTHROCYTE [SEDIMENTATION RATE] IN BLOOD: 82 MM/HR — HIGH (ref 0–10)
ESTIMATED AVERAGE GLUCOSE: 91 MG/DL — SIGNIFICANT CHANGE UP (ref 68–114)
GLUCOSE SERPL-MCNC: 117 MG/DL — HIGH (ref 70–99)
GLUCOSE SERPL-MCNC: 162 MG/DL — HIGH (ref 70–99)
HCT VFR BLD CALC: 36 % — LOW (ref 42–52)
HDLC SERPL-MCNC: 57 MG/DL — SIGNIFICANT CHANGE UP
HGB BLD-MCNC: 12.5 G/DL — LOW (ref 14–18)
IMM GRANULOCYTES NFR BLD AUTO: 1.2 % — HIGH (ref 0.1–0.3)
LACTATE SERPL-SCNC: 1.8 MMOL/L — SIGNIFICANT CHANGE UP (ref 0.7–2)
LACTATE SERPL-SCNC: 2.1 MMOL/L — HIGH (ref 0.7–2)
LACTATE SERPL-SCNC: 2.1 MMOL/L — HIGH (ref 0.7–2)
LIPID PNL WITH DIRECT LDL SERPL: 25 MG/DL — SIGNIFICANT CHANGE UP
LYMPHOCYTES # BLD AUTO: 0.28 K/UL — LOW (ref 1.2–3.4)
LYMPHOCYTES # BLD AUTO: 1.4 % — LOW (ref 20.5–51.1)
MAGNESIUM SERPL-MCNC: 1.4 MG/DL — LOW (ref 1.8–2.4)
MAGNESIUM SERPL-MCNC: 2.4 MG/DL — SIGNIFICANT CHANGE UP (ref 1.8–2.4)
MCHC RBC-ENTMCNC: 34.2 PG — HIGH (ref 27–31)
MCHC RBC-ENTMCNC: 34.7 G/DL — SIGNIFICANT CHANGE UP (ref 32–37)
MCV RBC AUTO: 98.4 FL — HIGH (ref 80–94)
MONOCYTES # BLD AUTO: 0.47 K/UL — SIGNIFICANT CHANGE UP (ref 0.1–0.6)
MONOCYTES NFR BLD AUTO: 2.3 % — SIGNIFICANT CHANGE UP (ref 1.7–9.3)
NEUTROPHILS # BLD AUTO: 19.66 K/UL — HIGH (ref 1.4–6.5)
NEUTROPHILS NFR BLD AUTO: 95 % — HIGH (ref 42.2–75.2)
NON HDL CHOLESTEROL: 36 MG/DL — SIGNIFICANT CHANGE UP
NRBC # BLD: 0 /100 WBCS — SIGNIFICANT CHANGE UP (ref 0–0)
NT-PROBNP SERPL-SCNC: 260 PG/ML — SIGNIFICANT CHANGE UP (ref 0–300)
PLATELET # BLD AUTO: 73 K/UL — LOW (ref 130–400)
POTASSIUM SERPL-MCNC: 3.7 MMOL/L — SIGNIFICANT CHANGE UP (ref 3.5–5)
POTASSIUM SERPL-MCNC: 3.7 MMOL/L — SIGNIFICANT CHANGE UP (ref 3.5–5)
POTASSIUM SERPL-SCNC: 3.7 MMOL/L — SIGNIFICANT CHANGE UP (ref 3.5–5)
POTASSIUM SERPL-SCNC: 3.7 MMOL/L — SIGNIFICANT CHANGE UP (ref 3.5–5)
PROCALCITONIN SERPL-MCNC: 8.43 NG/ML — HIGH (ref 0.02–0.1)
PROT SERPL-MCNC: 6.3 G/DL — SIGNIFICANT CHANGE UP (ref 6–8)
RBC # BLD: 3.66 M/UL — LOW (ref 4.7–6.1)
RBC # FLD: 12.9 % — SIGNIFICANT CHANGE UP (ref 11.5–14.5)
SARS-COV-2 IGG+IGM SERPL QL IA: >250 U/ML — HIGH
SARS-COV-2 IGG+IGM SERPL QL IA: POSITIVE
SODIUM SERPL-SCNC: 127 MMOL/L — LOW (ref 135–146)
SODIUM SERPL-SCNC: 131 MMOL/L — LOW (ref 135–146)
TRIGL SERPL-MCNC: 67 MG/DL — SIGNIFICANT CHANGE UP
TSH SERPL-MCNC: 0.48 UIU/ML — SIGNIFICANT CHANGE UP (ref 0.27–4.2)
WBC # BLD: 20.69 K/UL — HIGH (ref 4.8–10.8)
WBC # FLD AUTO: 20.69 K/UL — HIGH (ref 4.8–10.8)

## 2021-06-23 PROCEDURE — 99231 SBSQ HOSP IP/OBS SF/LOW 25: CPT

## 2021-06-23 RX ORDER — ACETAMINOPHEN 500 MG
650 TABLET ORAL EVERY 6 HOURS
Refills: 0 | Status: DISCONTINUED | OUTPATIENT
Start: 2021-06-23 | End: 2021-06-25

## 2021-06-23 RX ORDER — COLLAGENASE CLOSTRIDIUM HIST. 250 UNIT/G
1 OINTMENT (GRAM) TOPICAL
Refills: 0 | Status: DISCONTINUED | OUTPATIENT
Start: 2021-06-23 | End: 2021-06-25

## 2021-06-23 RX ORDER — SODIUM CHLORIDE 9 MG/ML
1000 INJECTION INTRAMUSCULAR; INTRAVENOUS; SUBCUTANEOUS
Refills: 0 | Status: DISCONTINUED | OUTPATIENT
Start: 2021-06-23 | End: 2021-06-23

## 2021-06-23 RX ORDER — SODIUM HYPOCHLORITE 0.125 %
1 SOLUTION, NON-ORAL MISCELLANEOUS
Refills: 0 | Status: DISCONTINUED | OUTPATIENT
Start: 2021-06-23 | End: 2021-06-25

## 2021-06-23 RX ORDER — CEFEPIME 1 G/1
2000 INJECTION, POWDER, FOR SOLUTION INTRAMUSCULAR; INTRAVENOUS EVERY 12 HOURS
Refills: 0 | Status: DISCONTINUED | OUTPATIENT
Start: 2021-06-23 | End: 2021-06-23

## 2021-06-23 RX ORDER — PANTOPRAZOLE SODIUM 20 MG/1
40 TABLET, DELAYED RELEASE ORAL
Refills: 0 | Status: DISCONTINUED | OUTPATIENT
Start: 2021-06-23 | End: 2021-06-25

## 2021-06-23 RX ORDER — CHLORHEXIDINE GLUCONATE 213 G/1000ML
1 SOLUTION TOPICAL
Refills: 0 | Status: DISCONTINUED | OUTPATIENT
Start: 2021-06-23 | End: 2021-06-25

## 2021-06-23 RX ORDER — OXYCODONE AND ACETAMINOPHEN 5; 325 MG/1; MG/1
1 TABLET ORAL ONCE
Refills: 0 | Status: DISCONTINUED | OUTPATIENT
Start: 2021-06-23 | End: 2021-06-23

## 2021-06-23 RX ORDER — METRONIDAZOLE 500 MG
500 TABLET ORAL EVERY 8 HOURS
Refills: 0 | Status: DISCONTINUED | OUTPATIENT
Start: 2021-06-23 | End: 2021-06-23

## 2021-06-23 RX ORDER — CEFEPIME 1 G/1
2000 INJECTION, POWDER, FOR SOLUTION INTRAMUSCULAR; INTRAVENOUS EVERY 12 HOURS
Refills: 0 | Status: DISCONTINUED | OUTPATIENT
Start: 2021-06-23 | End: 2021-06-25

## 2021-06-23 RX ORDER — METOPROLOL TARTRATE 50 MG
25 TABLET ORAL DAILY
Refills: 0 | Status: DISCONTINUED | OUTPATIENT
Start: 2021-06-23 | End: 2021-06-25

## 2021-06-23 RX ORDER — VANCOMYCIN HCL 1 G
1500 VIAL (EA) INTRAVENOUS EVERY 12 HOURS
Refills: 0 | Status: COMPLETED | OUTPATIENT
Start: 2021-06-23 | End: 2021-06-24

## 2021-06-23 RX ORDER — MAGNESIUM SULFATE 500 MG/ML
2 VIAL (ML) INJECTION
Refills: 0 | Status: COMPLETED | OUTPATIENT
Start: 2021-06-23 | End: 2021-06-23

## 2021-06-23 RX ORDER — ENOXAPARIN SODIUM 100 MG/ML
40 INJECTION SUBCUTANEOUS DAILY
Refills: 0 | Status: DISCONTINUED | OUTPATIENT
Start: 2021-06-23 | End: 2021-06-25

## 2021-06-23 RX ORDER — VANCOMYCIN HCL 1 G
1500 VIAL (EA) INTRAVENOUS EVERY 12 HOURS
Refills: 0 | Status: DISCONTINUED | OUTPATIENT
Start: 2021-06-23 | End: 2021-06-23

## 2021-06-23 RX ADMIN — Medication 25 MILLIGRAM(S): at 06:50

## 2021-06-23 RX ADMIN — OXYCODONE AND ACETAMINOPHEN 1 TABLET(S): 5; 325 TABLET ORAL at 09:17

## 2021-06-23 RX ADMIN — SODIUM CHLORIDE 75 MILLILITER(S): 9 INJECTION INTRAMUSCULAR; INTRAVENOUS; SUBCUTANEOUS at 01:00

## 2021-06-23 RX ADMIN — ENOXAPARIN SODIUM 40 MILLIGRAM(S): 100 INJECTION SUBCUTANEOUS at 11:21

## 2021-06-23 RX ADMIN — Medication 25 GRAM(S): at 10:04

## 2021-06-23 RX ADMIN — PANTOPRAZOLE SODIUM 40 MILLIGRAM(S): 20 TABLET, DELAYED RELEASE ORAL at 05:22

## 2021-06-23 RX ADMIN — Medication 100 MILLIGRAM(S): at 05:21

## 2021-06-23 RX ADMIN — Medication 1 APPLICATION(S): at 07:23

## 2021-06-23 RX ADMIN — Medication 100 MILLIGRAM(S): at 13:14

## 2021-06-23 RX ADMIN — CEFEPIME 100 MILLIGRAM(S): 1 INJECTION, POWDER, FOR SOLUTION INTRAMUSCULAR; INTRAVENOUS at 17:13

## 2021-06-23 RX ADMIN — OXYCODONE AND ACETAMINOPHEN 1 TABLET(S): 5; 325 TABLET ORAL at 08:44

## 2021-06-23 RX ADMIN — Medication 1 APPLICATION(S): at 17:58

## 2021-06-23 RX ADMIN — SODIUM CHLORIDE 75 MILLILITER(S): 9 INJECTION INTRAMUSCULAR; INTRAVENOUS; SUBCUTANEOUS at 08:44

## 2021-06-23 RX ADMIN — Medication 25 GRAM(S): at 09:10

## 2021-06-23 RX ADMIN — CEFEPIME 100 MILLIGRAM(S): 1 INJECTION, POWDER, FOR SOLUTION INTRAMUSCULAR; INTRAVENOUS at 05:21

## 2021-06-23 RX ADMIN — Medication 300 MILLIGRAM(S): at 17:14

## 2021-06-23 RX ADMIN — Medication 1 APPLICATION(S): at 17:14

## 2021-06-23 RX ADMIN — Medication 300 MILLIGRAM(S): at 05:22

## 2021-06-23 NOTE — PHYSICAL THERAPY INITIAL EVALUATION ADULT - THERAPY FREQUENCY, PT EVAL
Pt presents independent with bed mobility however pt declining OOB activity as he does not have his LLE prosthetic with him. PT can remain on stand by if pt demonstrates any needs for PT services, or if LLE prosthetic arrives; where PT can resume OOB assessment./1-2x/week

## 2021-06-23 NOTE — PROGRESS NOTE ADULT - SUBJECTIVE AND OBJECTIVE BOX
64yo M w pmhx of HTN, s/p L AKA presents from [facility] for fevers w/ a chief complaint of chronic venous stasis ulcers to right lower extremity with infection. . There was concern at the facility that patient has cellulitis of RLE. Patient with no current complaints at this time. ROS neg.In the ED, T102.3, /60, , RR18 w SpO2 975 on RA. Labs significant for wbc 24.66, Na 129 and lactate 2.9. Burn consulted - exam showed RLE chronic venous statis ulcer w surrounding erythema and edema. Patient s/p vanc, cefepime and flagyl x1;  started on IVF and admitted for RLE cellulitis.   ----------------------    Burn consulted to evaluate RLE venous stasis ulcers. Febrile yesterday 102.3.Denies pain. Does not know the name of his doctor who takes care of his legs.     Pt: no complaints    Vital Signs Last 24 Hrs  T(C): 37.4 (23 Jun 2021 04:00), Max: 39.1 (22 Jun 2021 18:29)  T(F): 99.3 (23 Jun 2021 04:00), Max: 102.3 (22 Jun 2021 18:29)  HR: 112 (23 Jun 2021 04:00) (95 - 117)  BP: 117/62 (23 Jun 2021 04:00) (93/52 - 129/60)  RR: 18 (23 Jun 2021 04:00) (18 - 18)  SpO2: 98% (23 Jun 2021 02:55) (97% - 98%)            06-23                          12.5   20.69 )-----------( 73       ( 23 Jun 2021 07:01 )             36.0             EXAM:   GEN: No acute distress  NEURO: Awake, alert  SKIN: RLE ulcerations 2/2 chronic venous stasis with erythema, wound bases are pink moist, no purulence, no necrotic tissue and moderate edema extending below the ankle to foot. No purulence.

## 2021-06-23 NOTE — H&P ADULT - HISTORY OF PRESENT ILLNESS
64yo M w pmhx of HTN, GERD, s/p L AKA presents from [facility] for fevers. Patient with no current complaints at this time. ROS neg.    In the ED, T102.3, /60, , RR18 w SpO2 975 on RA. Labs significant for wbc 24.66, Na 129 and lactate 2.9. Exam showed RLE chronic venous statis ulcer w surrounding erythema and edema. Patient s/p vanc, cefepime and flagyl x1, started on IVF and admitted for RLE cellulitis.  64yo M w pmhx of HTN, GERD, s/p L AKA presents from [facility] for fevers. Patient with no current complaints at this time. ROS neg.    In the ED, T102.3, /60, , RR18 w SpO2 975 on RA. Labs significant for wbc 24.66, Na 129 and lactate 2.9. Burn consulted - exam showed RLE chronic venous statis ulcer w surrounding erythema and edema. Patient s/p vanc, cefepime and flagyl x1;  started on IVF and admitted for RLE cellulitis.  64yo M w pmhx of HTN, s/p L AKA presents from [facility] for fevers. There was concern at the facility that patient has cellulitis of RLE. Patient with no current complaints at this time. ROS neg.    In the ED, T102.3, /60, , RR18 w SpO2 975 on RA. Labs significant for wbc 24.66, Na 129 and lactate 2.9. Burn consulted - exam showed RLE chronic venous statis ulcer w surrounding erythema and edema. Patient s/p vanc, cefepime and flagyl x1;  started on IVF and admitted for RLE cellulitis.

## 2021-06-23 NOTE — PHYSICAL THERAPY INITIAL EVALUATION ADULT - LEVEL OF INDEPENDENCE: GAIT, REHAB EVAL
Pt adamant that he does not want to transfer/ambulate without his L BKA prosthetic, which was left back at [facility] residence as per pt.

## 2021-06-23 NOTE — PHYSICAL THERAPY INITIAL EVALUATION ADULT - LEVEL OF INDEPENDENCE: SIT/STAND, REHAB EVAL
Pt declined OOB, reports he transfers with prosthetic but does not have it here at the hospital. Pt also stated he did not want to stand on RLE due to discomfort from RLE ulcer, but insists he transfers and amb independently with RW and prosthetic back at [facility] residence.

## 2021-06-23 NOTE — H&P ADULT - NSHPLABSRESULTS_GEN_ALL_CORE
Labs:                        14.2   24.66 )-----------( 104      ( 22 Jun 2021 19:40 )             38.5     129<L>  |  96<L>  |  15  ----------------------------<  103<H>  4.4   |  24  |  1.2    Ca    8.4<L>      22 Jun 2021 19:40                           Lactate, Blood: 2.1 mmol/L (06-22-21 @ 23:35)  Lactate, Blood: 2.4 mmol/L (06-22-21 @ 19:40)

## 2021-06-23 NOTE — PHYSICAL THERAPY INITIAL EVALUATION ADULT - AMBULATION SKILLS, REHAB EVAL
Pt reports amb and transferring with modified independence using RW and LLE prosthetic./independent/needs device

## 2021-06-23 NOTE — PHYSICAL THERAPY INITIAL EVALUATION ADULT - GENERAL OBSERVATIONS, REHAB EVAL
[id number] Pt rec semifowler in bed with +bed alarm, +IV, R foot ace wrapped, in NAD. Utilized [place] interpretation ID # [id number] t/o IE, pt agreeable to b/s PT.

## 2021-06-23 NOTE — H&P ADULT - NSICDXPASTMEDICALHX_GEN_ALL_CORE_FT
PAST MEDICAL HISTORY:  Circulation disorder of lower extremity left lower extremity    Edema     GERD (gastroesophageal reflux disease)     Hypertension

## 2021-06-23 NOTE — H&P ADULT - NSHPPHYSICALEXAM_GEN_ALL_CORE
PHYSICAL EXAM    GENERAL: NAD, well-groomed, well-developed  HEAD:  NCAT  EYES: EOMI, PERRL, conjunctiva clear  ENMT: No tonsillar erythema, exudates, or enlargement; Moist mucous membranes, Good dentition, No lesions  NECK: Supple, No JVD, Normal thyroid  NERVOUS SYSTEM: AAOX4, Good concentration; Motor Strength 5/5 B/L upper and lower extremities; DTRs 2+ intact and symmetric  CHEST/LUNG: CTA b/l no w/r/r  HEART: +s1s2 RRR no m/g/r  ABDOMEN: soft, NT/ND (+) bs, no HSM  EXTREMITIES:  2+ Peripheral Pulses, No c/c/e  LYMPH: No lymphadenopathy noted  SKIN: No rashes or lesions Vital Signs Last 24 Hrs  T(C): 37.2 (22 Jun 2021 22:58), Max: 39.1 (22 Jun 2021 18:29)  T(F): 98.9 (22 Jun 2021 22:58), Max: 102.3 (22 Jun 2021 18:29)  HR: 95 (23 Jun 2021 00:41) (95 - 117)  BP: 108/52 (23 Jun 2021 00:41) (93/52 - 129/60)  RR: 18 (22 Jun 2021 18:29) (18 - 18)  SpO2: 97% (22 Jun 2021 18:29) (97% - 97%)    PHYSICAL EXAM  GENERAL: NAD  HEENT:  NCAT, EOMI, moist mucous membranes  NECK: Supple, nontender  NERVOUS SYSTEM: awake and alert, no focal deficits  CHEST/LUNG: CTA b/l  HEART: +s1s2 RRR  ABDOMEN: soft, NT/ND  EXTREMITIES:  L AKA; R LE wrapped w venous stasis skin changes

## 2021-06-23 NOTE — PHYSICAL THERAPY INITIAL EVALUATION ADULT - PERTINENT HX OF CURRENT PROBLEM, REHAB EVAL
64yo M w pmhx of HTN, s/p L AKA presents from [facility] for fevers. There was concern at the facility that patient has cellulitis of RLE.

## 2021-06-23 NOTE — H&P ADULT - ASSESSMENT
66yo M w pmhx of HTN, GERD, s/p L JYOTHI presents from [facility] for fevers (Tmax 102.3). Patient found to be tachycardic with elevated wbc ct and lactate level. Exam showed RLE chronic venous statis ulcer w surrounding erythema and edema. Patient given stat dose of antibiotics, started on fluid and admitted for RLE cellulitis    #Chronic RLE Venous Stasis Ulcer w cellulitis - sepsis present on admission  - exam with RLE chronic venous statis ulcer w surrounding erythema and edema  - Burn consults - recs appreciated  - cont abx  - f/u pending cultures  - cont LWC  - ID consult placed  - tylenol prn for fevers, pain    #HTN  #GERD: cont ppi    DVT ppx:  GI ppx: ppi  Diet: DASH  Activity: IAT  Dispo: acute  64yo M w pmhx of HTN, GERD, s/p L AKA presents from [facility] for fevers (Tmax 102.3). Patient found to be tachycardic with elevated wbc ct and lactate level. Burn consulted - exam showed RLE chronic venous statis ulcer w surrounding erythema and edema. Patient given stat dose of antibiotics, started on fluid and admitted for RLE cellulitis    #Chronic RLE Venous Stasis Ulcer w cellulitis - sepsis present on admission  - exam with RLE chronic venous statis ulcer w surrounding erythema and edema  - Burn recs appreciated  - cont abx and ivf  - f/u pending cultures  - cont LWC  - ID consult placed  - tylenol prn for fevers, pain    #HTN - hypotensive in ED  - holding home bp meds  - cont IVF    #GERD: cont ppi    DVT ppx: lovenox  GI ppx: ppi  Diet: DASH  Activity: IAT  Dispo: acute  66yo M w pmhx of HTN, s/p L AKA presents from [facility] for fevers (Tmax 102.3). Patient found to be tachycardic with elevated wbc ct and lactate level. Burn consulted - exam showed RLE chronic venous statis ulcer w surrounding erythema and edema. Patient given stat dose of antibiotics, started on fluid and admitted for RLE cellulitis    #Chronic RLE Venous Stasis Ulcer w cellulitis - sepsis present on admission  - exam with RLE chronic venous statis ulcer w surrounding erythema and edema  - Burn recs appreciated  - cont abx and ivf  - f/u pending cultures  - cont LWC  - ID consult placed  - tylenol prn for fevers, pain    #HTN - became hypotensive in ED  - holding home bp meds for now (lasix, aldactone, toprol)  - s/p 1.5L boluses in ED - started on IVF - monitor    DVT ppx: lovenox  GI ppx: ppi  Diet: DASH  Activity: IAT  Dispo: acute  64yo M w pmhx of HTN, s/p L AKA presents from [facility] for fevers (Tmax 102.3). Patient found to be tachycardic with elevated wbc ct and lactate level. Burn consulted - exam showed RLE chronic venous statis ulcer w surrounding erythema and edema. Patient given stat dose of antibiotics, started on fluid and admitted for RLE cellulitis    #Chronic RLE Venous Stasis Ulcer w cellulitis - sepsis present on admission  - pt febrile and tachcardic on admission w wbc 24.66 and lactate 2.4  - exam with RLE chronic venous statis ulcer w surrounding erythema and edema  - Burn recs appreciated  - cont abx and ivf  - f/u pending cultures  - cont LWC  - ID consult placed  - tylenol prn for fevers, pain    #HTN - became hypotensive in ED  - holding home bp meds for now (lasix, aldactone, toprol)  - s/p 1.5L boluses in ED - started on IVF - monitor    #Obesity (BMI 39.8)  - f/u A1c and lipid panel  - outpatient nutrition f/u    DVT ppx: lovenox  GI ppx: ppi  Diet: DASH  Activity: IAT  Dispo: acute  64yo M w pmhx of HTN, s/p L AKA presents from [facility] for fevers (Tmax 102.3). Patient found to be tachycardic with elevated wbc ct and lactate level. Burn consulted - exam showed RLE chronic venous statis ulcer w surrounding erythema and edema. Patient given stat dose of antibiotics, started on fluid and admitted for RLE cellulitis    #Chronic RLE Venous Stasis Ulcer w Cellulitis - Sepsis present on admission  - pt febrile and tachycardic on admission w wbc 24.66 and lactate 2.4  - exam with RLE chronic venous statis ulcer w surrounding erythema and edema  - Burn recs appreciated  - cont cefepime and vanc for now as pt became hypotensive while in ED  - f/u pending cultures  - ID consult placed  - cont LWC  - tylenol prn for fevers, pain    #HTN - became hypotensive in ED  - holding home bp meds for now (lasix, aldactone, toprol)  - s/p 1.5L boluses in ED - cont bolus as needed    #Obesity (BMI 39.8)  - f/u A1c and lipid panel  - outpatient nutrition f/u    DVT ppx: lovenox  GI ppx: ppi  Diet: DASH  Activity: IAT  Dispo: acute  64yo M w pmhx of HTN, s/p L AKA presents from [facility] for fevers (Tmax 102.3). Patient found to be tachycardic with elevated wbc ct and lactate level. Burn consulted - exam showed RLE chronic venous statis ulcer w surrounding erythema and edema. Patient given stat dose of antibiotics, started on fluid and admitted for RLE cellulitis    #Chronic RLE Venous Stasis Ulcer w Cellulitis - Sepsis present on admission  - pt febrile and tachycardic on admission w wbc 24.66 and lactate 2.4  - exam with RLE chronic venous statis ulcer w surrounding erythema and edema  - Burn recs appreciated  - cont cefepime and vanc for now as pt became hypotensive while in ED  - ID consult placed  - f/u pending cultures - deescalate abx once resulted  - cont LWC  - tylenol prn for fevers, pain    #HTN - became hypotensive in ED  - holding home bp meds for now (lasix, aldactone, toprol)  - s/p 1.5L boluses in ED - cont bolus as needed    #Obesity (BMI 39.8)  - f/u A1c and lipid panel  - outpatient nutrition f/u    DVT ppx: lovenox  GI ppx: ppi  Diet: DASH  Activity: IAT  Dispo: acute  64yo M w pmhx of HTN, s/p L AKA presents from [facility] for fevers (Tmax 102.3). Patient found to be tachycardic with elevated wbc ct and lactate level. Burn consulted - exam showed RLE chronic venous statis ulcer w surrounding erythema and edema. Patient given stat dose of antibiotics, started on fluid and admitted for RLE cellulitis    #Chronic RLE Venous Stasis Ulcer w Cellulitis - Sepsis present on admission  - pt febrile and tachycardic on admission w wbc 24.66 and lactate 2.4  - exam with RLE chronic venous statis ulcer w surrounding erythema and edema  - Burn recs appreciated  - cont vanc, cefepime, flagyl for now - f/u pending cultures - deescalate abx once resulted  - ID consult placed  - cont LWC  - tylenol prn for fevers, pain    #HTN - became hypotensive in ED  - holding home bp meds for now (lasix, aldactone, toprol)  - s/p 1.5L boluses in ED - cont boluses as needed    #Obesity (BMI 39.8)  - f/u A1c and lipid panel  - outpatient nutrition f/u    DVT ppx: lovenox  GI ppx: ppi  Diet: DASH  Activity: IAT  Dispo: acute

## 2021-06-23 NOTE — H&P ADULT - ATTENDING COMMENTS
66yo M w pmhx of HTN, s/p L AKA presents from [facility] for fevers due to cellulitis at site of RLE chronic venous stasis ulcer- admitted with sepsis    Pt seen and examined - in good spirits, feels better    Spoke with RN at bedside- no events overnight    afebrile    LE bandaged    Chart reviewed- agree with above    IV abx    f/u blood and wound cultures for targeted abx    ID eval    Burn f/u for wound care and consider intervention    OOB    PT, rehab    DVT proph    decubitus prevention as per nursing protocol

## 2021-06-23 NOTE — CONSULT NOTE ADULT - SUBJECTIVE AND OBJECTIVE BOX
JUDY PEÑALOZA  65y, Male  Allergy: No Known Allergies      All historical available data reviewed.    HPI:  66yo M w pmhx of HTN, s/p L AKA presents from [facility] for fevers. There was concern at the facility that patient has cellulitis of RLE. Patient with no current complaints at this time. ROS neg.    In the ED, T102.3, /60, , RR18 w SpO2 975 on RA. Labs significant for wbc 24.66, Na 129 and lactate 2.9. Burn consulted - exam showed RLE chronic venous statis ulcer w surrounding erythema and edema. Patient s/p vanc, cefepime and flagyl x1;  started on IVF and admitted for RLE cellulitis.  (23 Jun 2021 00:37)    FAMILY HISTORY:    PAST MEDICAL & SURGICAL HISTORY:  Hypertension    GERD (gastroesophageal reflux disease)    Edema    Circulation disorder of lower extremity  left lower extremity    S/P BKA (below knee amputation), left          VITALS:  T(F): 99.3, Max: 102.3 (06-22-21 @ 18:29)  HR: 112  BP: 117/62  RR: 18Vital Signs Last 24 Hrs  T(C): 37.4 (23 Jun 2021 04:00), Max: 39.1 (22 Jun 2021 18:29)  T(F): 99.3 (23 Jun 2021 04:00), Max: 102.3 (22 Jun 2021 18:29)  HR: 112 (23 Jun 2021 04:00) (95 - 117)  BP: 117/62 (23 Jun 2021 04:00) (93/52 - 129/60)  BP(mean): --  RR: 18 (23 Jun 2021 04:00) (18 - 18)  SpO2: 98% (23 Jun 2021 02:55) (97% - 98%)    TESTS & MEASUREMENTS:                        12.5   20.69 )-----------( 73       ( 23 Jun 2021 07:01 )             36.0     06-23    127<L>  |  98  |  19  ----------------------------<  162<H>  3.7   |  21  |  1.0    Ca    7.3<L>      23 Jun 2021 07:01  Mg     1.4     06-23    TPro  6.3  /  Alb  2.5<L>  /  TBili  2.8<H>  /  DBili  x   /  AST  73<H>  /  ALT  34  /  AlkPhos  106  06-23    LIVER FUNCTIONS - ( 23 Jun 2021 07:01 )  Alb: 2.5 g/dL / Pro: 6.3 g/dL / ALK PHOS: 106 U/L / ALT: 34 U/L / AST: 73 U/L / GGT: x                   RADIOLOGY & ADDITIONAL TESTS:  Personal review of radiological diagnostics performed  Echo and EKG results noted when applicable.     MEDICATIONS:  acetaminophen   Tablet .. 650 milliGRAM(s) Oral every 6 hours PRN  cefepime   IVPB 2000 milliGRAM(s) IV Intermittent every 12 hours  chlorhexidine 4% Liquid 1 Application(s) Topical <User Schedule>  Dakins Solution - Full Strength 1 Application(s) Topical two times a day  enoxaparin Injectable 40 milliGRAM(s) SubCutaneous daily  metoprolol succinate ER 25 milliGRAM(s) Oral daily  metroNIDAZOLE  IVPB 500 milliGRAM(s) IV Intermittent every 8 hours  pantoprazole    Tablet 40 milliGRAM(s) Oral before breakfast  silver sulfADIAZINE 1% Cream 1 Application(s) Topical two times a day  vancomycin  IVPB 1500 milliGRAM(s) IV Intermittent every 12 hours      ANTIBIOTICS:  cefepime   IVPB 2000 milliGRAM(s) IV Intermittent every 12 hours  metroNIDAZOLE  IVPB 500 milliGRAM(s) IV Intermittent every 8 hours  vancomycin  IVPB 1500 milliGRAM(s) IV Intermittent every 12 hours

## 2021-06-23 NOTE — CONSULT NOTE ADULT - ASSESSMENT
IMPRESSION: Rehab of left AKA     PRECAUTIONS: [   ] Cardiac  [   ] Respiratory  [   ] Seizures [   ] Contact Isolation  [   ] Droplet Isolation  [   ] Other    Weight Bearing Status:     RECOMMENDATION:    Out of Bed to Chair     DVT/Decubiti Prophylaxis    REHAB PLAN:     [ x   ] Bedside P/T 3-5 times a week   [    ]   Bedside O/T  2-3 times a week             [    ] Speech Therapy               [    ]  No Rehab Therapy Indicated   Conditioning/ROM                                    ADL  Bed Mobility                                               Conditioning/ROM  Transfers                                                     Bed Mobility  Sitting /Standing Balance                         Transfers                                        Gait Training                                               Sitting/Standing Balance  Stair Training [   ]Applicable                    Home equipment Eval                                                                        Splinting  [   ] Only      GOALS:   ADL   [    ]   Independent                    Transfers  [x    ] Independent                          Ambulation  [  x  ] Independent     [   x  ] With device                            [    ]  CG                                                         [    ]  CG                                                                  [    ] CG                            [    ] Min A                                                   [    ] Min A                                                              [    ] Min  A          DISCHARGE PLAN:   [    ]  Good candidate for Intensive Rehabilitation/Hospital based                                             Will tolerate 3hrs Intensive Rehab Daily                                       [  x   ]  Short Term Rehab in Skilled Nursing Facility  /  [facility]                                       [     ]  Home with Outpatient or  services                                         [     ]  Possible Candidate for Intensive Hospital based Rehab                                       
66 y/o M with significant pmhx HTN, RLE Edema, GERD, s/p L AKA p/w chronic venous stasis wounds to RLE.    
64yo M w pmhx of HTN, s/p L AKA presents from [facility] for fevers (Tmax 102.3). Patient found to be tachycardic with elevated wbc ct and lactate level. Burn consulted - exam showed RLE chronic venous statis ulcer w surrounding erythema and edema. Patient given stat dose of antibiotics, started on fluid and admitted for RLE cellulitis    IMPRESSION;  Sepsis ( t 102.3, /m, WBC 20.6 ) secondary to cellulitis RLE superimposed on chronic venous stasis ulcers  WBC 10.6    RECOMMENDATIONS;   BCx  F/u with BURN  Vancomycin 1500 mg iv q12h. Trough before the 5th dose  Cefepime 2 gm iv q12h

## 2021-06-23 NOTE — CONSULT NOTE ADULT - SUBJECTIVE AND OBJECTIVE BOX
HPI:  66yo M w pmhx of HTN, s/p L AKA presents from [facility] for fevers. There was concern at the facility that patient has cellulitis of RLE. Patient with no current complaints at this time. ROS neg.    In the ED, T102.3, /60, , RR18 w SpO2 975 on RA. Labs significant for wbc 24.66, Na 129 and lactate 2.9. Burn consulted - exam showed RLE chronic venous statis ulcer w surrounding erythema and edema. Patient s/p vanc, cefepime and flagyl x1;  started on IVF and admitted for RLE cellulitis.        PAST MEDICAL & SURGICAL HISTORY:  Hypertension    GERD (gastroesophageal reflux disease)    Edema    Circulation disorder of lower extremity  left lower extremity    S/P BKA (below knee amputation), left        Hospital Course:    TODAY'S SUBJECTIVE & REVIEW OF SYMPTOMS:     Constitutional WNL   Cardio WNL   Resp WNL   GI WNL  Heme WNL  Endo WNL  Skin WNL  MSK Weakness  Neuro WNL  Cognitive WNL  Psych WNL      MEDICATIONS  (STANDING):  cefepime   IVPB 2000 milliGRAM(s) IV Intermittent every 12 hours  chlorhexidine 4% Liquid 1 Application(s) Topical <User Schedule>  Dakins Solution - Full Strength 1 Application(s) Topical two times a day  enoxaparin Injectable 40 milliGRAM(s) SubCutaneous daily  metoprolol succinate ER 25 milliGRAM(s) Oral daily  metroNIDAZOLE  IVPB 500 milliGRAM(s) IV Intermittent every 8 hours  pantoprazole    Tablet 40 milliGRAM(s) Oral before breakfast  silver sulfADIAZINE 1% Cream 1 Application(s) Topical two times a day  vancomycin  IVPB 1500 milliGRAM(s) IV Intermittent every 12 hours    MEDICATIONS  (PRN):  acetaminophen   Tablet .. 650 milliGRAM(s) Oral every 6 hours PRN Temp greater or equal to 38C (100.4F), Mild Pain (1 - 3),      FAMILY HISTORY:      Allergies    No Known Allergies    Intolerances        SOCIAL HISTORY:    [  ] Etoh  [  ] Smoking  [  ] Substance abuse     Home Environment:  [   ] Home Alone  [   ] Lives with Family  [   ] Home Health Aid    Dwelling:  [   ] Apartment  [   ] Private House  [   ] Adult Home  [x   ] Skilled Nursing Facility      [   ] Short Term  [   ] Long Term  [   ] Stairs       Elevator [   ]    FUNCTIONAL STATUS PTA: (Check all that apply)  Ambulation: [ x   ]Independent    [   ] Dependent     [   ] Non-Ambulatory  Assistive Device: [   ] SA Cane  [   ]  Q Cane  [  x ] Walker with prosthesis   [   ]  Wheelchair  ADL : [   ] Independent  [ x  ]  Dependent       Vital Signs Last 24 Hrs  T(C): 37.4 (23 Jun 2021 04:00), Max: 39.1 (22 Jun 2021 18:29)  T(F): 99.3 (23 Jun 2021 04:00), Max: 102.3 (22 Jun 2021 18:29)  HR: 112 (23 Jun 2021 04:00) (95 - 117)  BP: 117/62 (23 Jun 2021 04:00) (93/52 - 129/60)  BP(mean): --  RR: 18 (23 Jun 2021 04:00) (18 - 18)  SpO2: 98% (23 Jun 2021 02:55) (97% - 98%)      PHYSICAL EXAM: Awake & Alert  GENERAL: NAD  HEAD:  Normocephalic  CHEST/LUNG: Clear   HEART: S1S2+  ABDOMEN: Soft, Nontender  EXTREMITIES:  left AKA, right leg edema    NERVOUS SYSTEM:  Cranial Nerves 2-12 intact [   ] Abnormal  [   ]  ROM: WFL all extremities [   ]  Abnormal [ x  ]limited rle  Motor Strength: WFL all extremities  [   ]  Abnormal [  x ]limited rle  Sensation: intact to light touch [ x  ] Abnormal [   ]    FUNCTIONAL STATUS:  Bed Mobility: Independent [   ]  Supervision [   ]  Needs Assistance [ x  ]  N/A [   ]  Transfers: Independent [   ]  Supervision [   ]  Needs Assistance [  x ]  N/A [   ]   Ambulation: Independent [   ]  Supervision [   ]  Needs Assistance [   ]  N/A [   ]  ADL: Independent [   ] Requires Assistance [   ] N/A [   ]      LABS:                        12.5   20.69 )-----------( 73       ( 23 Jun 2021 07:01 )             36.0     06-23    127<L>  |  98  |  19  ----------------------------<  162<H>  3.7   |  21  |  1.0    Ca    7.3<L>      23 Jun 2021 07:01  Mg     1.4     06-23    TPro  6.3  /  Alb  2.5<L>  /  TBili  2.8<H>  /  DBili  x   /  AST  73<H>  /  ALT  34  /  AlkPhos  106  06-23          RADIOLOGY & ADDITIONAL STUDIES:

## 2021-06-23 NOTE — PHYSICAL THERAPY INITIAL EVALUATION ADULT - ADDITIONAL COMMENTS
Pt reports that his LLE prosthetic is still at [facility]'s residence, therefore unable to demonstrate transfers/amb at this time.

## 2021-06-23 NOTE — PROGRESS NOTE ADULT - ASSESSMENT
66 y/o M with pmhx HTN, RLE Edema, GERD, s/p L AKA p/w chronic venous stasis wounds to RLE.        Recommendation:   - Wash with soap and water. Apply santyl, cover with xeroform, wrap with kerlix and ACE from toes to below knees. BID  - wound culture taken - f/u results  - c/w IV antibiotics  - no surgical debridement needed

## 2021-06-24 ENCOUNTER — TRANSCRIPTION ENCOUNTER (OUTPATIENT)
Age: 66
End: 2021-06-24

## 2021-06-24 LAB
ANION GAP SERPL CALC-SCNC: 6 MMOL/L — LOW (ref 7–14)
APPEARANCE UR: ABNORMAL
BACTERIA # UR AUTO: NEGATIVE — SIGNIFICANT CHANGE UP
BASOPHILS # BLD AUTO: 0 K/UL — SIGNIFICANT CHANGE UP (ref 0–0.2)
BASOPHILS NFR BLD AUTO: 0 % — SIGNIFICANT CHANGE UP (ref 0–1)
BILIRUB UR-MCNC: NEGATIVE — SIGNIFICANT CHANGE UP
BUN SERPL-MCNC: 16 MG/DL — SIGNIFICANT CHANGE UP (ref 10–20)
CALCIUM SERPL-MCNC: 7.4 MG/DL — LOW (ref 8.5–10.1)
CHLORIDE SERPL-SCNC: 100 MMOL/L — SIGNIFICANT CHANGE UP (ref 98–110)
CO2 SERPL-SCNC: 24 MMOL/L — SIGNIFICANT CHANGE UP (ref 17–32)
COLOR SPEC: YELLOW — SIGNIFICANT CHANGE UP
CREAT ?TM UR-MCNC: 27 MG/DL — SIGNIFICANT CHANGE UP
CREAT SERPL-MCNC: 1 MG/DL — SIGNIFICANT CHANGE UP (ref 0.7–1.5)
CRP SERPL-MCNC: 125 MG/L — HIGH
DIFF PNL FLD: ABNORMAL
EOSINOPHIL # BLD AUTO: 0.11 K/UL — SIGNIFICANT CHANGE UP (ref 0–0.7)
EOSINOPHIL NFR BLD AUTO: 0.9 % — SIGNIFICANT CHANGE UP (ref 0–8)
EPI CELLS # UR: 0 /HPF — SIGNIFICANT CHANGE UP (ref 0–5)
GIANT PLATELETS BLD QL SMEAR: PRESENT — SIGNIFICANT CHANGE UP
GLUCOSE SERPL-MCNC: 100 MG/DL — HIGH (ref 70–99)
GLUCOSE UR QL: NEGATIVE — SIGNIFICANT CHANGE UP
HCT VFR BLD CALC: 35.4 % — LOW (ref 42–52)
HGB BLD-MCNC: 13.1 G/DL — LOW (ref 14–18)
HYALINE CASTS # UR AUTO: 0 /LPF — SIGNIFICANT CHANGE UP (ref 0–7)
KETONES UR-MCNC: NEGATIVE — SIGNIFICANT CHANGE UP
LEUKOCYTE ESTERASE UR-ACNC: NEGATIVE — SIGNIFICANT CHANGE UP
LYMPHOCYTES # BLD AUTO: 0.51 K/UL — LOW (ref 1.2–3.4)
LYMPHOCYTES # BLD AUTO: 4.3 % — LOW (ref 20.5–51.1)
MAGNESIUM SERPL-MCNC: 2.1 MG/DL — SIGNIFICANT CHANGE UP (ref 1.8–2.4)
MANUAL SMEAR VERIFICATION: SIGNIFICANT CHANGE UP
MCHC RBC-ENTMCNC: 36.9 PG — HIGH (ref 27–31)
MCHC RBC-ENTMCNC: 37 G/DL — SIGNIFICANT CHANGE UP (ref 32–37)
MCV RBC AUTO: 99.7 FL — HIGH (ref 80–94)
MONOCYTES # BLD AUTO: 0.73 K/UL — HIGH (ref 0.1–0.6)
MONOCYTES NFR BLD AUTO: 6.1 % — SIGNIFICANT CHANGE UP (ref 1.7–9.3)
NEUTROPHILS # BLD AUTO: 10.58 K/UL — HIGH (ref 1.4–6.5)
NEUTROPHILS NFR BLD AUTO: 88.7 % — HIGH (ref 42.2–75.2)
NITRITE UR-MCNC: NEGATIVE — SIGNIFICANT CHANGE UP
OSMOLALITY UR: 131 MOS/KG — SIGNIFICANT CHANGE UP (ref 50–1200)
PH UR: 7 — SIGNIFICANT CHANGE UP (ref 5–8)
PLAT MORPH BLD: NORMAL — SIGNIFICANT CHANGE UP
PLATELET # BLD AUTO: 86 K/UL — LOW (ref 130–400)
POLYCHROMASIA BLD QL SMEAR: SLIGHT — SIGNIFICANT CHANGE UP
POTASSIUM SERPL-MCNC: 3.7 MMOL/L — SIGNIFICANT CHANGE UP (ref 3.5–5)
POTASSIUM SERPL-SCNC: 3.7 MMOL/L — SIGNIFICANT CHANGE UP (ref 3.5–5)
POTASSIUM UR-SCNC: 7 MMOL/L — SIGNIFICANT CHANGE UP
PROT UR-MCNC: NEGATIVE — SIGNIFICANT CHANGE UP
RBC # BLD: 3.55 M/UL — LOW (ref 4.7–6.1)
RBC # FLD: 12.9 % — SIGNIFICANT CHANGE UP (ref 11.5–14.5)
RBC BLD AUTO: ABNORMAL
RBC CASTS # UR COMP ASSIST: 5 /HPF — HIGH (ref 0–4)
SODIUM SERPL-SCNC: 130 MMOL/L — LOW (ref 135–146)
SODIUM UR-SCNC: 20 MMOL/L — SIGNIFICANT CHANGE UP
SP GR SPEC: 1.01 — LOW (ref 1.01–1.03)
UROBILINOGEN FLD QL: ABNORMAL
WBC # BLD: 11.93 K/UL — HIGH (ref 4.8–10.8)
WBC # FLD AUTO: 11.93 K/UL — HIGH (ref 4.8–10.8)
WBC UR QL: 1 /HPF — SIGNIFICANT CHANGE UP (ref 0–5)

## 2021-06-24 PROCEDURE — 93925 LOWER EXTREMITY STUDY: CPT | Mod: 26

## 2021-06-24 PROCEDURE — 73562 X-RAY EXAM OF KNEE 3: CPT | Mod: 26,RT

## 2021-06-24 PROCEDURE — 71045 X-RAY EXAM CHEST 1 VIEW: CPT | Mod: 26

## 2021-06-24 RX ORDER — VANCOMYCIN HCL 1 G
1000 VIAL (EA) INTRAVENOUS ONCE
Refills: 0 | Status: COMPLETED | OUTPATIENT
Start: 2021-06-25 | End: 2021-06-25

## 2021-06-24 RX ORDER — COLLAGENASE CLOSTRIDIUM HIST. 250 UNIT/G
1 OINTMENT (GRAM) TOPICAL
Qty: 0 | Refills: 0 | DISCHARGE
Start: 2021-06-24

## 2021-06-24 RX ORDER — SODIUM HYPOCHLORITE 0.125 %
1 SOLUTION, NON-ORAL MISCELLANEOUS
Qty: 0 | Refills: 0 | DISCHARGE
Start: 2021-06-24

## 2021-06-24 RX ADMIN — Medication 1 APPLICATION(S): at 05:40

## 2021-06-24 RX ADMIN — Medication 300 MILLIGRAM(S): at 17:18

## 2021-06-24 RX ADMIN — CEFEPIME 100 MILLIGRAM(S): 1 INJECTION, POWDER, FOR SOLUTION INTRAMUSCULAR; INTRAVENOUS at 05:40

## 2021-06-24 RX ADMIN — CHLORHEXIDINE GLUCONATE 1 APPLICATION(S): 213 SOLUTION TOPICAL at 05:40

## 2021-06-24 RX ADMIN — ENOXAPARIN SODIUM 40 MILLIGRAM(S): 100 INJECTION SUBCUTANEOUS at 11:25

## 2021-06-24 RX ADMIN — Medication 300 MILLIGRAM(S): at 05:37

## 2021-06-24 RX ADMIN — PANTOPRAZOLE SODIUM 40 MILLIGRAM(S): 20 TABLET, DELAYED RELEASE ORAL at 05:40

## 2021-06-24 RX ADMIN — CEFEPIME 100 MILLIGRAM(S): 1 INJECTION, POWDER, FOR SOLUTION INTRAMUSCULAR; INTRAVENOUS at 17:17

## 2021-06-24 RX ADMIN — Medication 25 MILLIGRAM(S): at 05:40

## 2021-06-24 NOTE — PROGRESS NOTE ADULT - SUBJECTIVE AND OBJECTIVE BOX
Patient was seen and examined. Spoke with HO. Chart reviewed.  No events overnight.  Vital Signs Last 24 Hrs  T(F): 97.1 (24 Jun 2021 05:05), Max: 99.5 (23 Jun 2021 20:25)  HR: 98 (24 Jun 2021 05:05) (98 - 100)  BP: 105/52 (24 Jun 2021 05:05) (97/46 - 131/66)  SpO2: --  MEDICATIONS  (STANDING):  cefepime   IVPB 2000 milliGRAM(s) IV Intermittent every 12 hours  chlorhexidine 4% Liquid 1 Application(s) Topical <User Schedule>  collagenase Ointment 1 Application(s) Topical two times a day  Dakins Solution - Full Strength 1 Application(s) Topical two times a day  enoxaparin Injectable 40 milliGRAM(s) SubCutaneous daily  metoprolol succinate ER 25 milliGRAM(s) Oral daily  pantoprazole    Tablet 40 milliGRAM(s) Oral before breakfast  vancomycin  IVPB 1500 milliGRAM(s) IV Intermittent every 12 hours    MEDICATIONS  (PRN):  acetaminophen   Tablet .. 650 milliGRAM(s) Oral every 6 hours PRN Temp greater or equal to 38C (100.4F), Mild Pain (1 - 3),    Labs:                        12.5   20.69 )-----------( 73       ( 23 Jun 2021 07:01 )             36.0                         14.2   24.66 )-----------( 104      ( 22 Jun 2021 19:40 )             38.5     23 Jun 2021 17:34    131    |  102    |  17     ----------------------------<  117    3.7     |  23     |  0.9    23 Jun 2021 07:01    127    |  98     |  19     ----------------------------<  162    3.7     |  21     |  1.0      Ca    7.5        23 Jun 2021 17:34  Ca    7.3        23 Jun 2021 07:01  Mg     2.4       23 Jun 2021 17:34  Mg     1.4       23 Jun 2021 07:01    TPro  6.3    /  Alb  2.5    /  TBili  2.8    /  DBili  x      /  AST  73     /  ALT  34     /  AlkPhos  106    23 Jun 2021 07:01          Culture - Blood (collected 22 Jun 2021 19:30)  Source: .Blood Blood-Peripheral  Preliminary Report (24 Jun 2021 01:02):    No growth to date.    Culture - Blood (collected 22 Jun 2021 19:15)  Source: .Blood Blood-Peripheral  Preliminary Report (24 Jun 2021 01:02):    No growth to date.      General: comfortable, NAD  Neurology: A&Ox3, nonfocal  RLE bandaged  L AKA      A/P:  66yo M w pmhx of HTN, s/p L AKA presents from [facility] for fevers due to cellulitis at site of RLE chronic venous stasis ulcer- admitted with sepsis      IV abx as per ID    ID f/u for final recs- PO vs IV via PICC    Burn f/u for wound care- no surgical intervention    OOB    PT, rehab    DC planning after abx plan finalized by ID    DVT prophylaxis  Decubitus prevention- all measures as per RN protocol  Please call or text me with any questions or updates

## 2021-06-24 NOTE — DISCHARGE NOTE PROVIDER - CARE PROVIDER_API CALL
[facility]   INTERNAL MEDICINE  [address] [place] [address]  Phone: [phone]  Fax: [phone]  Follow Up Time:     Luis F Hutchison)  Plastic Surgery  [address] [place] [address]  Phone: [phone]  Fax: [phone]  Follow Up Time:

## 2021-06-24 NOTE — DISCHARGE NOTE PROVIDER - NSDCCPCAREPLAN_GEN_ALL_CORE_FT
PRINCIPAL DISCHARGE DIAGNOSIS  Diagnosis: Cellulitis of right lower leg  Assessment and Plan of Treatment: Your high grade fevers and lower extremity erythema was likely due to  Cellulitis on top of your chronic venous ulcers which is an infection of the skin. You were treated with intravenous antibioitcs with resolution of your symptomps. Take the prescribed antibiotic medicine you are given as directed until it is gone. Take it even if you feel better. It treats the infection and stops it from  Not taking all the medicine can make future infections hard to treat. Keep the infected area clean and continue with routine wound care with nursing as instructed.   Call your healthcare provider immediately if you have any of the following: Difficulty or pain when moving the joints above or below the infected area, Discharge or pus draining from the area, rever of 100.4°F (38°C) or higher, or as directed by your healthcare provider, pain that gets worse in or around the infected site, redness that gets worse in or around the infected area, particularly if the area of redness expands to a wider area, shaking chills, swelling of the infected area, vomiting.        SECONDARY DISCHARGE DIAGNOSES  Diagnosis: Chronic cutaneous venous stasis ulcer  Assessment and Plan of Treatment: Please continue current wound care and medications as prescribed. Follow up with your vascular surgery appointment scheduled on 06/29/21.

## 2021-06-24 NOTE — PROGRESS NOTE ADULT - ASSESSMENT
a 66yo M w pmhx of HTN, s/p L AKA presents from [facility] for fevers (Tmax 102.3). Patient found to be tachycardic with elevated wbc ct and lactate level. Burn consulted - exam showed RLE chronic venous statis ulcer w surrounding erythema and edema. Patient given stat dose of antibiotics, started on fluid and admitted for RLE cellulitis    #Chronic RLE Venous Stasis Ulcer w Cellulitis - Sepsis on admission  - pt afebrile for more than 24 hrs.  - RLE chronic venous statis ulcer w surrounding erythema and edema spreading proximally to the knee. Demarcated rash on knee on the medial side.   - Burn following  - BCx neg, fup with wound Cx  - ID following. Vanc 1500 and cefepime. Fup with Vanc trough before 5th dose per ID  - tylenol prn for fevers, pain  - PT evaluated. Physiatry recommends short term Rehab in skilled nursing facility    #HTN - became hypotensive in ED  - stopped htn meds, started on metoprolol for tachycardia  - Pressure on the lower side. continue to monitor  - s/p 1.5L boluses in ED - cont boluses as needed      #Obesity (BMI 39.8)  -- A1C 4.8  - lipid panel wnl  - outpatient nutrition f/u      DVT ppx: lovenox  GI ppx: ppi  Diet: DASH  Activity: IAT  Dispo: acute    66yo M w pmhx of HTN, s/p L AKA presents from [facility] for fevers (Tmax 102.3). Patient found to be tachycardic with elevated wbc ct and lactate level. Burn consulted - exam showed RLE chronic venous statis ulcer w surrounding erythema and edema. Patient given stat dose of antibiotics, started on fluid and admitted for RLE cellulitis    #Chronic RLE Venous Stasis Ulcer w Cellulitis - Sepsis on admission  - pt afebrile for more than 24 hrs.  - RLE chronic venous statis ulcer w surrounding erythema and edema spreading proximally to the knee. Demarcated rash on knee on the medial side.   - Burn following  - BCx neg, fup with wound Cx  - ID following. Vanc 1500 and cefepime. Fup with Vanc trough before 5th dose per ID  - tylenol prn for fevers, pain  - PT evaluated. Physiatry recommends short term Rehab in skilled nursing facility  - fup on U/A and urine lytes  - fup on MRSA PCR    #HTN - became hypotensive in ED  - stopped htn meds, started on metoprolol for tachycardia  - Pressure on the lower side. continue to monitor  - s/p 1.5L boluses in ED - cont boluses as needed    #Obesity (BMI 39.8)  - A1C 4.8  - lipid panel wnl  - outpatient nutrition f/u      DVT ppx: lovenox  GI ppx: ppi  Diet: DASH  Activity: IAT  Dispo: acute    66yo M w pmhx of HTN, s/p L AKA presents from [facility] for fevers (Tmax 102.3). Patient found to be tachycardic with elevated wbc ct and lactate level. Burn consulted - exam showed RLE chronic venous statis ulcer w surrounding erythema and edema. Patient given stat dose of antibiotics, started on fluid and admitted for RLE cellulitis    #Chronic RLE Venous Stasis Ulcer w Cellulitis - Sepsis on admission  - pt afebrile for more than 24 hrs.  - RLE chronic venous statis ulcer w surrounding erythema and edema spreading proximally to the knee. Demarcated rash on knee on the medial side.   - Burn following  - BCx neg, fup with wound Cx  - ID following. Vanc 1500 and cefepime. Fup with Vanc trough before 5th dose per ID  - tylenol prn for fevers, pain  - PT evaluated. Physiatry recommends short term Rehab in skilled nursing facility  - fup on U/A and urine lytes  - fup on MRSA PCR  - [facility] duplex ordered, fup with results.     #HTN - became hypotensive in ED  - stopped htn meds, started on metoprolol for tachycardia  - Pressure on the lower side. continue to monitor  - s/p 1.5L boluses in ED - cont boluses as needed    #Obesity (BMI 39.8)  - A1C 4.8  - lipid panel wnl  - outpatient nutrition f/u      DVT ppx: lovenox  GI ppx: ppi  Diet: DASH  Activity: IAT  Dispo: acute    66yo M w pmhx of HTN, s/p L AKA presents from [facility] for fevers (Tmax 102.3). Patient found to be tachycardic with elevated wbc ct and lactate level. Burn consulted - exam showed RLE chronic venous statis ulcer w surrounding erythema and edema. Patient given stat dose of antibiotics, started on fluid and admitted for RLE cellulitis    #Chronic RLE Venous Stasis Ulcer w Cellulitis - Sepsis on admission  - pt afebrile for more than 24 hrs.  - RLE chronic venous statis ulcer w surrounding erythema and edema spreading proximally to the knee. Demarcated rash on knee on the medial side.   - Burn following  - BCx neg, fup with wound Cx  - ID following. Vanc 1500 and cefepime. Fup with Vanc trough before 5th dose per ID  - tylenol prn for fevers, pain  - PT evaluated. Physiatry recommends short term Rehab in skilled nursing facility  - fup on U/A and urine lytes  - fup on MRSA PCR  - VA duplex ordered, fup with results.   - Xray chest 06/24: No radiographic evidence of acute cardiopulmonary disease.  - Xray knee ordered, fup results.    #HTN - became hypotensive in ED  - stopped htn meds, started on metoprolol for tachycardia  - Pressure on the lower side. continue to monitor  - s/p 1.5L boluses in ED - cont boluses as needed    #Obesity (BMI 39.8)  - A1C 4.8  - lipid panel wnl  - outpatient nutrition f/u      DVT ppx: lovenox  GI ppx: ppi  Diet: DASH  Activity: IAT  Dispo: acute

## 2021-06-24 NOTE — PROGRESS NOTE ADULT - SUBJECTIVE AND OBJECTIVE BOX
JUDY PEÑALOZA 65y Male  MRN#: [id number]   Hospital Day: 2d    HPI:  66yo M w pmhx of HTN, s/p L AKA presents from [facility] for fevers. There was concern at the facility that patient has cellulitis of RLE. Patient with no current complaints at this time. ROS neg.    In the ED, T102.3, /60, , RR18 w SpO2 975 on RA. Labs significant for wbc 24.66, Na 129 and lactate 2.9. Burn consulted - exam showed RLE chronic venous statis ulcer w surrounding erythema and edema. Patient s/p vanc, cefepime and flagyl x1;  started on IVF and admitted for RLE cellulitis.  (23 Jun 2021 00:37)      SUBJECTIVE  Patient is a 65y old Male who presents with a chief complaint of Fever (24 Jun 2021 07:40)  Currently admitted to medicine with the primary diagnosis of Sepsis.      INTERVAL HPI AND OVERNIGHT EVENTS:  Patient was examined and seen at bedside. This morning he is resting comfortably in bed and reports no issues or overnight events.  He endorses right knee swelling redness spreading proximally to the leg.   He denies nausea/ v/d, abdominal pain, chest pain shortness pain.     OBJECTIVE  PAST MEDICAL & SURGICAL HISTORY  Hypertension    GERD (gastroesophageal reflux disease)    Edema    Circulation disorder of lower extremity  left lower extremity    S/P AKA (Above knee amputation), left      ALLERGIES:  No Known Allergies    MEDICATIONS:  STANDING MEDICATIONS  cefepime   IVPB 2000 milliGRAM(s) IV Intermittent every 12 hours  chlorhexidine 4% Liquid 1 Application(s) Topical <User Schedule>  collagenase Ointment 1 Application(s) Topical two times a day  Dakins Solution - Full Strength 1 Application(s) Topical two times a day  enoxaparin Injectable 40 milliGRAM(s) SubCutaneous daily  metoprolol succinate ER 25 milliGRAM(s) Oral daily  pantoprazole    Tablet 40 milliGRAM(s) Oral before breakfast  vancomycin  IVPB 1500 milliGRAM(s) IV Intermittent every 12 hours    PRN MEDICATIONS  acetaminophen   Tablet .. 650 milliGRAM(s) Oral every 6 hours PRN      VITAL SIGNS: Last 24 Hours  T(C): 36.2 (24 Jun 2021 05:05), Max: 37.5 (23 Jun 2021 20:25)  T(F): 97.1 (24 Jun 2021 05:05), Max: 99.5 (23 Jun 2021 20:25)  HR: 98 (24 Jun 2021 05:05) (98 - 100)  BP: 105/52 (24 Jun 2021 05:05) (97/46 - 131/66)  BP(mean): --  RR: 17 (24 Jun 2021 05:05) (17 - 20)  SpO2: --    LABS:                        13.1   11.93 )-----------( 86       ( 24 Jun 2021 08:10 )             35.4     06-24    130<L>  |  100  |  16  ----------------------------<  100<H>  3.7   |  24  |  1.0    Ca    7.4<L>      24 Jun 2021 08:10  Mg     2.1     06-24    TPro  6.3  /  Alb  2.5<L>  /  TBili  2.8<H>  /  DBili  x   /  AST  73<H>  /  ALT  34  /  AlkPhos  106  06-23      Lactate, Blood: 1.8 mmol/L (06-23-21 @ 17:34)      Culture - Blood (collected 22 Jun 2021 19:30)  Source: .Blood Blood-Peripheral  Preliminary Report (24 Jun 2021 01:02):    No growth to date.    Culture - Blood (collected 22 Jun 2021 19:15)  Source: .Blood Blood-Peripheral  Preliminary Report (24 Jun 2021 01:02):    No growth to date.    RADIOLOGY:  XRAY of LE right 06/22: Subcutaneous soft tissue swelling without acute osseous abnormality.    PHYSICAL EXAM:  CONSTITUTIONAL: No acute distress, well-developed, well-groomed, AAOx3  HEAD: Atraumatic, normocephalic  EYES: EOM intact, PERRLA, conjunctiva and sclera clear  ENT: Supple, no masses, no thyromegaly, no bruits, no JVD; moist mucous membranes  PULMONARY: Clear to auscultation bilaterally; no wheezes, rales, or rhonchi  CARDIOVASCULAR: Regular rate and rhythm; no murmurs, rubs, or gallops  GASTROINTESTINAL: Soft, non-tender, non-distended; bowel sounds present  MUSCULOSKELETAL: 2+ peripheral pulses; no clubbing, no cyanosis, no edema  NEUROLOGY: non-focal  SKIN: No rashes or lesions; warm and dry    ASSESSMENT & PLAN  #    PAST MEDICAL & SURGICAL HISTORY:  Hypertension    GERD (gastroesophageal reflux disease)    Edema    Circulation disorder of lower extremity  left lower extremity    S/P BKA (below knee amputation), left        #Misc  - DVT Prophylaxis:  - Diet:  - GI Prophylaxis:  - Activity:  - IV Fluids:  - Code Status:    Dispo: JUDY PEÑALOZA 65y Male  MRN#: [id number]   Hospital Day: 2d    HPI:  64yo M w pmhx of HTN, s/p L AKA presents from [facility] for fevers. There was concern at the facility that patient has cellulitis of RLE. Patient with no current complaints at this time. ROS neg.    In the ED, T102.3, /60, , RR18 w SpO2 975 on RA. Labs significant for wbc 24.66, Na 129 and lactate 2.9. Burn consulted - exam showed RLE chronic venous statis ulcer w surrounding erythema and edema. Patient s/p vanc, cefepime and flagyl x1;  started on IVF and admitted for RLE cellulitis.  (23 Jun 2021 00:37)      SUBJECTIVE  Patient is a 65y old Male who presents with a chief complaint of Fever (24 Jun 2021 07:40)  Currently admitted to medicine with the primary diagnosis of Sepsis.      INTERVAL HPI AND OVERNIGHT EVENTS:  Patient was examined and seen at bedside. This morning he is resting comfortably in bed and reports no issues or overnight events.  He endorses right knee swelling redness spreading proximally to the leg.   He denies nausea/ v/d, abdominal pain, chest pain shortness pain.     OBJECTIVE  PAST MEDICAL & SURGICAL HISTORY  Hypertension    GERD (gastroesophageal reflux disease)    Edema    Circulation disorder of lower extremity  left lower extremity    S/P AKA (Above knee amputation), left      ALLERGIES:  No Known Allergies    MEDICATIONS:  STANDING MEDICATIONS  cefepime   IVPB 2000 milliGRAM(s) IV Intermittent every 12 hours  chlorhexidine 4% Liquid 1 Application(s) Topical <User Schedule>  collagenase Ointment 1 Application(s) Topical two times a day  Dakins Solution - Full Strength 1 Application(s) Topical two times a day  enoxaparin Injectable 40 milliGRAM(s) SubCutaneous daily  metoprolol succinate ER 25 milliGRAM(s) Oral daily  pantoprazole    Tablet 40 milliGRAM(s) Oral before breakfast  vancomycin  IVPB 1500 milliGRAM(s) IV Intermittent every 12 hours    PRN MEDICATIONS  acetaminophen   Tablet .. 650 milliGRAM(s) Oral every 6 hours PRN      VITAL SIGNS: Last 24 Hours  T(C): 36.2 (24 Jun 2021 05:05), Max: 37.5 (23 Jun 2021 20:25)  T(F): 97.1 (24 Jun 2021 05:05), Max: 99.5 (23 Jun 2021 20:25)  HR: 98 (24 Jun 2021 05:05) (98 - 100)  BP: 105/52 (24 Jun 2021 05:05) (97/46 - 131/66)  BP(mean): --  RR: 17 (24 Jun 2021 05:05) (17 - 20)  SpO2: --    LABS:                        13.1   11.93 )-----------( 86       ( 24 Jun 2021 08:10 )             35.4     06-24    130<L>  |  100  |  16  ----------------------------<  100<H>  3.7   |  24  |  1.0    Ca    7.4<L>      24 Jun 2021 08:10  Mg     2.1     06-24    TPro  6.3  /  Alb  2.5<L>  /  TBili  2.8<H>  /  DBili  x   /  AST  73<H>  /  ALT  34  /  AlkPhos  106  06-23      Lactate, Blood: 1.8 mmol/L (06-23-21 @ 17:34)      Culture - Blood (collected 22 Jun 2021 19:30)  Source: .Blood Blood-Peripheral  Preliminary Report (24 Jun 2021 01:02):    No growth to date.    Culture - Blood (collected 22 Jun 2021 19:15)  Source: .Blood Blood-Peripheral  Preliminary Report (24 Jun 2021 01:02):    No growth to date.    RADIOLOGY:  XRAY of LE right 06/22: Subcutaneous soft tissue swelling without acute osseous abnormality.    PHYSICAL EXAM:  CONSTITUTIONAL: No acute distress, well-developed, well-groomed, AAOx3  HEAD: Atraumatic, normocephalic  EYES: EOM intact, PERRLA, conjunctiva and sclera clear  PULMONARY: Clear to auscultation bilaterally; no wheezes, rales, or rhonchi  CARDIOVASCULAR: Regular rate and rhythm; no murmurs, rubs, or gallops  GASTROINTESTINAL: Soft, non-tender, non-distended; bowel sounds present  MUSCULOSKELETAL: Right LE wrapped in Ace bandage, Skin around the knee warm to touch with visible demarcated erythematous rash right below the knee medially.  NEUROLOGY: non-focal

## 2021-06-24 NOTE — DISCHARGE NOTE PROVIDER - NSDCFUADDINST_GEN_ALL_CORE_FT
Please continue wound care: Wash with soap and water. Apply santyl, cover with xeroform, wrap with kerlix and ACE from toes to below knees. BID    Please follow up with vascular surgery on 06/29/21

## 2021-06-24 NOTE — PROGRESS NOTE ADULT - SUBJECTIVE AND OBJECTIVE BOX
JUDY PEÑALOZA  65y, Male    All available historical data reviewed    OVERNIGHT EVENTS:  no fevers  feels well and has no complaints   no pain RLE    ROS:  General: Denies rigors, nightsweats  HEENT: Denies headache, rhinorrhea, sore throat, eye pain  CV: Denies CP, palpitations  PULM: Denies wheezing, hemoptysis  GI: Denies hematemesis, hematochezia, melena  : Denies discharge, hematuria  MSK: Denies arthralgias, myalgias  SKIN: Denies rash, lesions  NEURO: Denies paresthesias, weakness  PSYCH: Denies depression, anxiety    VITALS:  T(F): 97.1, Max: 99.5 (21 @ 20:25)  HR: 98  BP: 105/52  RR: 17Vital Signs Last 24 Hrs  T(C): 36.2 (2021 05:05), Max: 37.5 (2021 20:25)  T(F): 97.1 (2021 05:05), Max: 99.5 (2021 20:25)  HR: 98 (2021 05:05) (98 - 100)  BP: 105/52 (2021 05:05) (97/46 - 131/66)  BP(mean): --  RR: 17 (2021 05:05) (17 - 20)  SpO2: --    TESTS & MEASUREMENTS:                        13.1   11.93 )-----------( 86       ( 2021 08:10 )             35.4     -    130<L>  |  100  |  16  ----------------------------<  100<H>  3.7   |  24  |  1.0    Ca    7.4<L>      2021 08:10  Mg     2.1     -    TPro  6.3  /  Alb  2.5<L>  /  TBili  2.8<H>  /  DBili  x   /  AST  73<H>  /  ALT  34  /  AlkPhos  106  06-    LIVER FUNCTIONS - ( 2021 07:01 )  Alb: 2.5 g/dL / Pro: 6.3 g/dL / ALK PHOS: 106 U/L / ALT: 34 U/L / AST: 73 U/L / GGT: x             Culture - Blood (collected 21 @ 19:30)  Source: .Blood Blood-Peripheral  Preliminary Report (21 @ 01:02):    No growth to date.    Culture - Blood (collected 21 @ 19:15)  Source: .Blood Blood-Peripheral  Preliminary Report (21 @ 01:02):    No growth to date.      Urinalysis Basic - ( 2021 09:29 )    Color: Yellow / Appearance: Slightly Turbid / S.006 / pH: x  Gluc: x / Ketone: Negative  / Bili: Negative / Urobili: 3 mg/dL   Blood: x / Protein: Negative / Nitrite: Negative   Leuk Esterase: Negative / RBC: 5 /HPF / WBC 1 /HPF   Sq Epi: x / Non Sq Epi: 0 /HPF / Bacteria: Negative          RADIOLOGY & ADDITIONAL TESTS:  Personal review of radiological diagnostics performed  Echo and EKG results noted when applicable.     MEDICATIONS:  acetaminophen   Tablet .. 650 milliGRAM(s) Oral every 6 hours PRN  cefepime   IVPB 2000 milliGRAM(s) IV Intermittent every 12 hours  chlorhexidine 4% Liquid 1 Application(s) Topical <User Schedule>  collagenase Ointment 1 Application(s) Topical two times a day  Dakins Solution - Full Strength 1 Application(s) Topical two times a day  enoxaparin Injectable 40 milliGRAM(s) SubCutaneous daily  metoprolol succinate ER 25 milliGRAM(s) Oral daily  pantoprazole    Tablet 40 milliGRAM(s) Oral before breakfast  vancomycin  IVPB 1500 milliGRAM(s) IV Intermittent every 12 hours      ANTIBIOTICS:  cefepime   IVPB 2000 milliGRAM(s) IV Intermittent every 12 hours  vancomycin  IVPB 1500 milliGRAM(s) IV Intermittent every 12 hours     No

## 2021-06-24 NOTE — DISCHARGE NOTE PROVIDER - NSDCMRMEDTOKEN_GEN_ALL_CORE_FT
acetaminophen 325 mg oral tablet: 2 tab(s) orally every 6 hours, As needed, Temp greater or equal to 38C (100.4F), Mild Pain (1 - 3)  Aldactone 25 mg oral tablet: one tablet orally daily  Augmentin 875 mg-125 mg oral tablet: 1 tab(s) orally 2 times a day   collagenase 250 units/g topical ointment: 1 application topically 2 times a day  doxycycline hyclate 100 mg oral tablet: 1 tab(s) orally 2 times a day   Lasix 20 mg oral tablet: three tablets daily  sodium hypochlorite 0.5% topical solution: 1 application topically 2 times a day  Toprol-XL 25 mg oral tablet, extended release: 1 tab(s) orally once a day

## 2021-06-24 NOTE — PROGRESS NOTE ADULT - ASSESSMENT
· Assessment	  64yo M w pmhx of HTN, s/p L AKA presents from [facility] for fevers (Tmax 102.3). Patient found to be tachycardic with elevated wbc ct and lactate level. Burn consulted - exam showed RLE chronic venous statis ulcer w surrounding erythema and edema. Patient given stat dose of antibiotics, started on fluid and admitted for RLE cellulitis    IMPRESSION;  Resolved sepsis secondary to cellulitis RLE superimposed on chronic venous stasis ulcers  WBC 20.6>11.9  6/22 BCx NG  6/23 BURN ; conservative management  Clinically stable    RECOMMENDATIONS;   Change iv to po  Augmentin 875 mg q12h and po Doxycycline 100 mg q12h on 6/25 for 8 more days  recall prn please

## 2021-06-25 ENCOUNTER — TRANSCRIPTION ENCOUNTER (OUTPATIENT)
Age: 66
End: 2021-06-25

## 2021-06-25 VITALS
TEMPERATURE: 98 F | RESPIRATION RATE: 18 BRPM | DIASTOLIC BLOOD PRESSURE: 62 MMHG | HEART RATE: 99 BPM | SYSTOLIC BLOOD PRESSURE: 134 MMHG

## 2021-06-25 LAB
ANION GAP SERPL CALC-SCNC: 9 MMOL/L — SIGNIFICANT CHANGE UP (ref 7–14)
BASOPHILS # BLD AUTO: 0.04 K/UL — SIGNIFICANT CHANGE UP (ref 0–0.2)
BASOPHILS NFR BLD AUTO: 0.4 % — SIGNIFICANT CHANGE UP (ref 0–1)
BUN SERPL-MCNC: 16 MG/DL — SIGNIFICANT CHANGE UP (ref 10–20)
CALCIUM SERPL-MCNC: 7.7 MG/DL — LOW (ref 8.5–10.1)
CHLORIDE SERPL-SCNC: 103 MMOL/L — SIGNIFICANT CHANGE UP (ref 98–110)
CO2 SERPL-SCNC: 21 MMOL/L — SIGNIFICANT CHANGE UP (ref 17–32)
CREAT SERPL-MCNC: 0.9 MG/DL — SIGNIFICANT CHANGE UP (ref 0.7–1.5)
EOSINOPHIL # BLD AUTO: 0.06 K/UL — SIGNIFICANT CHANGE UP (ref 0–0.7)
EOSINOPHIL NFR BLD AUTO: 0.6 % — SIGNIFICANT CHANGE UP (ref 0–8)
GLUCOSE SERPL-MCNC: 137 MG/DL — HIGH (ref 70–99)
HCT VFR BLD CALC: 36.5 % — LOW (ref 42–52)
HGB BLD-MCNC: 13.4 G/DL — LOW (ref 14–18)
IMM GRANULOCYTES NFR BLD AUTO: 1.1 % — HIGH (ref 0.1–0.3)
LYMPHOCYTES # BLD AUTO: 0.81 K/UL — LOW (ref 1.2–3.4)
LYMPHOCYTES # BLD AUTO: 8.5 % — LOW (ref 20.5–51.1)
MAGNESIUM SERPL-MCNC: 1.9 MG/DL — SIGNIFICANT CHANGE UP (ref 1.8–2.4)
MCHC RBC-ENTMCNC: 36.7 G/DL — SIGNIFICANT CHANGE UP (ref 32–37)
MCHC RBC-ENTMCNC: 36.8 PG — HIGH (ref 27–31)
MCV RBC AUTO: 100.3 FL — HIGH (ref 80–94)
MONOCYTES # BLD AUTO: 0.56 K/UL — SIGNIFICANT CHANGE UP (ref 0.1–0.6)
MONOCYTES NFR BLD AUTO: 5.8 % — SIGNIFICANT CHANGE UP (ref 1.7–9.3)
NEUTROPHILS # BLD AUTO: 8 K/UL — HIGH (ref 1.4–6.5)
NEUTROPHILS NFR BLD AUTO: 83.6 % — HIGH (ref 42.2–75.2)
NRBC # BLD: 0 /100 WBCS — SIGNIFICANT CHANGE UP (ref 0–0)
PLATELET # BLD AUTO: 92 K/UL — LOW (ref 130–400)
POTASSIUM SERPL-MCNC: 3.7 MMOL/L — SIGNIFICANT CHANGE UP (ref 3.5–5)
POTASSIUM SERPL-SCNC: 3.7 MMOL/L — SIGNIFICANT CHANGE UP (ref 3.5–5)
RBC # BLD: 3.64 M/UL — LOW (ref 4.7–6.1)
RBC # FLD: 12.8 % — SIGNIFICANT CHANGE UP (ref 11.5–14.5)
SODIUM SERPL-SCNC: 133 MMOL/L — LOW (ref 135–146)
VANCOMYCIN TROUGH SERPL-MCNC: 14.2 UG/ML — HIGH (ref 5–10)
WBC # BLD: 9.58 K/UL — SIGNIFICANT CHANGE UP (ref 4.8–10.8)
WBC # FLD AUTO: 9.58 K/UL — SIGNIFICANT CHANGE UP (ref 4.8–10.8)

## 2021-06-25 RX ORDER — SODIUM HYPOCHLORITE 0.125 %
1 SOLUTION, NON-ORAL MISCELLANEOUS
Qty: 60 | Refills: 0
Start: 2021-06-25 | End: 2021-07-24

## 2021-06-25 RX ORDER — OXYCODONE AND ACETAMINOPHEN 5; 325 MG/1; MG/1
1 TABLET ORAL ONCE
Refills: 0 | Status: DISCONTINUED | OUTPATIENT
Start: 2021-06-25 | End: 2021-06-25

## 2021-06-25 RX ORDER — COLLAGENASE CLOSTRIDIUM HIST. 250 UNIT/G
1 OINTMENT (GRAM) TOPICAL
Qty: 60 | Refills: 0
Start: 2021-06-25 | End: 2021-07-24

## 2021-06-25 RX ADMIN — Medication 25 MILLIGRAM(S): at 05:19

## 2021-06-25 RX ADMIN — Medication 1 APPLICATION(S): at 06:51

## 2021-06-25 RX ADMIN — CEFEPIME 100 MILLIGRAM(S): 1 INJECTION, POWDER, FOR SOLUTION INTRAMUSCULAR; INTRAVENOUS at 05:18

## 2021-06-25 RX ADMIN — ENOXAPARIN SODIUM 40 MILLIGRAM(S): 100 INJECTION SUBCUTANEOUS at 11:31

## 2021-06-25 RX ADMIN — CHLORHEXIDINE GLUCONATE 1 APPLICATION(S): 213 SOLUTION TOPICAL at 05:18

## 2021-06-25 RX ADMIN — Medication 1 APPLICATION(S): at 06:52

## 2021-06-25 RX ADMIN — Medication 250 MILLIGRAM(S): at 11:51

## 2021-06-25 RX ADMIN — PANTOPRAZOLE SODIUM 40 MILLIGRAM(S): 20 TABLET, DELAYED RELEASE ORAL at 08:01

## 2021-06-25 NOTE — CHART NOTE - NSCHARTNOTEFT_GEN_A_CORE
<<<RESIDENT DISCHARGE NOTE>>>     JUDY PEÑALOZA  MRN-[id number]    VITAL SIGNS:  T(F): 98.5 (06-25-21 @ 13:10), Max: 98.6 (06-25-21 @ 04:24)  HR: 99 (06-25-21 @ 13:10)  BP: 134/62 (06-25-21 @ 13:10)  SpO2: --      PHYSICAL EXAMINATION:  CONSTITUTIONAL: No acute distress, well-developed, well-groomed, AAOx3  HEAD: Atraumatic, normocephalic  EYES: EOM intact, PERRLA, conjunctiva and sclera clear  PULMONARY: Clear to auscultation bilaterally; no wheezes, rales, or rhonchi  CARDIOVASCULAR: Regular rate and rhythm; no murmurs, rubs, or gallops  GASTROINTESTINAL: Soft, non-tender, non-distended; bowel sounds present  MUSCULOSKELETAL: Right LE wrapped in Ace bandage, Skin around the knee warm to touch with visible demarcated erythematous rash right below the knee medially.  NEUROLOGY: non-focal    TEST RESULTS:                        13.4   9.58  )-----------( 92       ( 25 Jun 2021 09:08 )             36.5       06-25    133<L>  |  103  |  16  ----------------------------<  137<H>  3.7   |  21  |  0.9    Ca    7.7<L>      25 Jun 2021 09:08  Mg     1.9     06-25        FINAL DISCHARGE INTERVIEW:  Resident(s) Present: (Name:______Je Schuler_______), RN Present: (Name:  ___________)    DISCHARGE MEDICATION RECONCILIATION  reviewed with Attending (Name:___________)    DISPOSITION:   [  ] Home,    [  ] Home with Visiting Nursing Services,   [    ]  SNF/ NH,    [   ] Acute Rehab (4A),   [   ] Other (Specify:_________)

## 2021-06-25 NOTE — DISCHARGE NOTE NURSING/CASE MANAGEMENT/SOCIAL WORK - PATIENT PORTAL LINK FT
You can access the FollowMyHealth Patient Portal offered by [facility] by registering at the following website: [email or web address]. By joining [email or web address]’s FollowMyHealth portal, you will also be able to view your health information using other applications (apps) compatible with our system.

## 2021-06-25 NOTE — PROGRESS NOTE ADULT - SUBJECTIVE AND OBJECTIVE BOX
Patient was seen and examined. Spoke with RN. Chart reviewed.  No events overnight.  Vital Signs Last 24 Hrs  T(F): 98.6 (2021 04:24), Max: 98.6 (2021 04:24)  HR: 94 (2021 04:24) (94 - 97)  BP: 123/55 (2021 04:24) (115/53 - 127/60)  SpO2: --  MEDICATIONS  (STANDING):  cefepime   IVPB 2000 milliGRAM(s) IV Intermittent every 12 hours  chlorhexidine 4% Liquid 1 Application(s) Topical <User Schedule>  collagenase Ointment 1 Application(s) Topical two times a day  Dakins Solution - Full Strength 1 Application(s) Topical two times a day  enoxaparin Injectable 40 milliGRAM(s) SubCutaneous daily  metoprolol succinate ER 25 milliGRAM(s) Oral daily  pantoprazole    Tablet 40 milliGRAM(s) Oral before breakfast  vancomycin  IVPB 1000 milliGRAM(s) IV Intermittent once    MEDICATIONS  (PRN):  acetaminophen   Tablet .. 650 milliGRAM(s) Oral every 6 hours PRN Temp greater or equal to 38C (100.4F), Mild Pain (1 - 3),    Labs:                        13.1   11.93 )-----------( 86       ( 2021 08:10 )             35.4     2021 08:10    130    |  100    |  16     ----------------------------<  100    3.7     |  24     |  1.0    2021 17:34    131    |  102    |  17     ----------------------------<  117    3.7     |  23     |  0.9      Ca    7.4        2021 08:10  Ca    7.5        2021 17:34  Mg     2.1       2021 08:10  Mg     2.4       2021 17:34        Urinalysis Basic - ( 2021 09:29 )    Color: Yellow / Appearance: Slightly Turbid / S.006 / pH: x  Gluc: x / Ketone: Negative  / Bili: Negative / Urobili: 3 mg/dL   Blood: x / Protein: Negative / Nitrite: Negative   Leuk Esterase: Negative / RBC: 5 /HPF / WBC 1 /HPF   Sq Epi: x / Non Sq Epi: 0 /HPF / Bacteria: Negative        Culture - Blood (collected 2021 19:30)  Source: .Blood Blood-Peripheral  Preliminary Report (2021 01:02):    No growth to date.    Culture - Blood (collected 2021 19:15)  Source: .Blood Blood-Peripheral  Preliminary Report (2021 01:02):    No growth to date.      General: comfortable, NAD  Neurology: A&Ox3, nonfocal  L AKA  R LE bandaged    A/P:  66yo M w pmhx of HTN, s/p L AKA presents from [facility] for fevers due to cellulitis at site of RLE chronic venous stasis ulcer- admitted with sepsis    abx as per ID- change to PO    wound care as per Burn    OOB    PT, rehab    DC today with VNS     f/u with Burn clinic as outpt    DVT prophylaxis  Decubitus prevention- all measures as per RN protocol  Please call or text me with any questions or updates

## 2021-06-26 LAB
-  AMIKACIN: SIGNIFICANT CHANGE UP
-  AMPICILLIN/SULBACTAM: SIGNIFICANT CHANGE UP
-  AZTREONAM: SIGNIFICANT CHANGE UP
-  CEFAZOLIN: SIGNIFICANT CHANGE UP
-  CEFEPIME: SIGNIFICANT CHANGE UP
-  CEFTAZIDIME: SIGNIFICANT CHANGE UP
-  CIPROFLOXACIN: SIGNIFICANT CHANGE UP
-  CLINDAMYCIN: SIGNIFICANT CHANGE UP
-  ERYTHROMYCIN: SIGNIFICANT CHANGE UP
-  GENTAMICIN: SIGNIFICANT CHANGE UP
-  GENTAMICIN: SIGNIFICANT CHANGE UP
-  IMIPENEM: SIGNIFICANT CHANGE UP
-  LEVOFLOXACIN: SIGNIFICANT CHANGE UP
-  MEROPENEM: SIGNIFICANT CHANGE UP
-  OXACILLIN: SIGNIFICANT CHANGE UP
-  PENICILLIN: SIGNIFICANT CHANGE UP
-  PIPERACILLIN/TAZOBACTAM: SIGNIFICANT CHANGE UP
-  RIFAMPIN: SIGNIFICANT CHANGE UP
-  TETRACYCLINE: SIGNIFICANT CHANGE UP
-  TOBRAMYCIN: SIGNIFICANT CHANGE UP
-  TRIMETHOPRIM/SULFAMETHOXAZOLE: SIGNIFICANT CHANGE UP
-  VANCOMYCIN: SIGNIFICANT CHANGE UP
CULTURE RESULTS: SIGNIFICANT CHANGE UP
METHOD TYPE: SIGNIFICANT CHANGE UP
METHOD TYPE: SIGNIFICANT CHANGE UP
ORGANISM # SPEC MICROSCOPIC CNT: SIGNIFICANT CHANGE UP
SPECIMEN SOURCE: SIGNIFICANT CHANGE UP

## 2021-06-29 ENCOUNTER — APPOINTMENT (OUTPATIENT)
Dept: VASCULAR SURGERY | Facility: CLINIC | Age: 66
End: 2021-06-29
Payer: MEDICARE

## 2021-06-29 PROCEDURE — 99213 OFFICE O/P EST LOW 20 MIN: CPT

## 2021-06-29 NOTE — ASSESSMENT
[FreeTextEntry1] : 66 y/o gentleman with h/o L AKA about 20 years ago in [place], h/o RLE DVT, now with large chronic ulcerations to right, was admitted last week June 2021 for sepsis due to right lower extremity cellulitis and lymphangitis, was discharged on Doxycycline and Augmentin.\par \par I recommend that he complete oral antibiotics and continue with Silver Alginate daily and ace bandage. I will see him back in one weeks time.

## 2021-06-29 NOTE — HISTORY OF PRESENT ILLNESS
[FreeTextEntry1] : 64 y/o gentleman with h/o L AKA about 20 years ago in [place], h/o RLE DVT, now with large chronic ulcerations to right, was admitted last week June 2021 for sepsis due to right lower extremity cellulitis and lymphangitis, was discharged on Doxycycline and Augmentin.

## 2021-07-02 DIAGNOSIS — L03.115 CELLULITIS OF RIGHT LOWER LIMB: ICD-10-CM

## 2021-07-02 DIAGNOSIS — A41.9 SEPSIS, UNSPECIFIED ORGANISM: ICD-10-CM

## 2021-07-02 DIAGNOSIS — I10 ESSENTIAL (PRIMARY) HYPERTENSION: ICD-10-CM

## 2021-07-02 DIAGNOSIS — E66.9 OBESITY, UNSPECIFIED: ICD-10-CM

## 2021-07-02 DIAGNOSIS — Z20.822 CONTACT WITH AND (SUSPECTED) EXPOSURE TO COVID-19: ICD-10-CM

## 2021-07-02 DIAGNOSIS — R50.9 FEVER, UNSPECIFIED: ICD-10-CM

## 2021-07-02 DIAGNOSIS — I87.8 OTHER SPECIFIED DISORDERS OF VEINS: ICD-10-CM

## 2021-07-06 ENCOUNTER — APPOINTMENT (OUTPATIENT)
Dept: VASCULAR SURGERY | Facility: CLINIC | Age: 66
End: 2021-07-06
Payer: MEDICARE

## 2021-07-06 PROCEDURE — 99213 OFFICE O/P EST LOW 20 MIN: CPT

## 2021-07-06 NOTE — ASSESSMENT
[FreeTextEntry1] : 66 y/o gentleman with h/o L AKA about 20 years ago in [place], h/o RLE DVT, now with large chronic ulcerations to right, was admitted  June 2021 for sepsis due to right lower extremity cellulitis and lymphangitis, was discharged on Doxycycline and Augmentin.\par \par I recommend  he be treated with Silver Alginate daily and ace bandage daily because of the significant drainage present. I will see him back in one weeks time.

## 2021-07-06 NOTE — HISTORY OF PRESENT ILLNESS
[FreeTextEntry1] : 66 y/o gentleman with h/o L AKA about 20 years ago in [place], h/o RLE DVT, now with large chronic ulcerations to right, was admitted  June 2021 for sepsis due to right lower extremity cellulitis and lymphangitis, was discharged on Doxycycline and Augmentin.  cellulitis seemed to have resolved however his wounds have not changed. He still has copious drainage present.

## 2021-07-13 ENCOUNTER — APPOINTMENT (OUTPATIENT)
Dept: VASCULAR SURGERY | Facility: CLINIC | Age: 66
End: 2021-07-13
Payer: MEDICARE

## 2021-07-13 PROCEDURE — 99214 OFFICE O/P EST MOD 30 MIN: CPT

## 2021-07-13 RX ORDER — DOXYCYCLINE HYCLATE 100 MG/1
100 TABLET ORAL TWICE DAILY
Qty: 20 | Refills: 0 | Status: ACTIVE | COMMUNITY
Start: 2021-07-13 | End: 1900-01-01

## 2021-07-13 NOTE — ASSESSMENT
[FreeTextEntry1] : 66 y/o gentleman with h/o L AKA about 20 years ago in [place], h/o RLE DVT, now with large chronic ulcerations to right, was admitted  June 2021 for sepsis due to right lower extremity cellulitis and lymphangitis, was discharged on Doxycycline and Augmentin that he completed one week ago, c/o pain and redness to right medial thigh. Denies any fever or chills.\par \par I recommend  he be treated with Silver Alginate dressings daily and ace bandage daily because of the significant drainage present. I am prescribing him 10 days of Doxycycline for right leg cellulitis. I will see him back in one weeks time.

## 2021-07-13 NOTE — HISTORY OF PRESENT ILLNESS
[FreeTextEntry1] : 64 y/o gentleman with h/o L AKA about 20 years ago in [place], h/o RLE DVT, now with large chronic ulcerations to right, was admitted  June 2021 for sepsis due to right lower extremity cellulitis and lymphangitis, was discharged on Doxycycline and Augmentin that he completed one week ago. C/o significant pain and drainage.

## 2021-07-22 ENCOUNTER — APPOINTMENT (OUTPATIENT)
Dept: VASCULAR SURGERY | Facility: CLINIC | Age: 66
End: 2021-07-22
Payer: MEDICARE

## 2021-07-22 PROCEDURE — 99212 OFFICE O/P EST SF 10 MIN: CPT

## 2021-07-22 NOTE — ASSESSMENT
[FreeTextEntry1] : 64 y/o gentleman with h/o L AKA about 20 years ago in [place], h/o RLE DVT, now with large chronic ulcerations to right, was admitted  June 2021 for sepsis due to right lower extremity cellulitis and lymphangitis, was discharged on Doxycycline and Augmentin that he completed one week ago, c/o pain and redness to right medial thigh. Denies any fever or chills.\par \par I recommend  to continue with Silver Alginate dressings daily and ace bandage daily.\par \par I will see him back in two weeks time.

## 2021-07-22 NOTE — HISTORY OF PRESENT ILLNESS
[FreeTextEntry1] : 66 y/o gentleman with h/o L AKA about 20 years ago in [place], h/o RLE DVT, now with large chronic ulcerations to right, was admitted  June 2021 for sepsis due to right lower extremity cellulitis and lymphangitis, was discharged on Doxycycline and Augmentin.\par \par Completed second course of oral antibiotics, pain improved, wounds improving with Silver Alginate dressings daily and compression.

## 2021-07-26 RX ORDER — FOAM BANDAGE 8"X5.5"
BANDAGE TOPICAL
Qty: 30 | Refills: 0 | Status: ACTIVE | COMMUNITY
Start: 2021-07-26 | End: 1900-01-01

## 2021-08-05 ENCOUNTER — APPOINTMENT (OUTPATIENT)
Dept: VASCULAR SURGERY | Facility: CLINIC | Age: 66
End: 2021-08-05
Payer: MEDICARE

## 2021-08-05 ENCOUNTER — INPATIENT (INPATIENT)
Facility: HOSPITAL | Age: 66
LOS: 4 days | Discharge: NURSING, REHAB OR LONG-TERM CARE | End: 2021-08-10
Attending: INTERNAL MEDICINE | Admitting: INTERNAL MEDICINE
Payer: MEDICARE

## 2021-08-05 VITALS
HEART RATE: 91 BPM | SYSTOLIC BLOOD PRESSURE: 152 MMHG | WEIGHT: 214.95 LBS | TEMPERATURE: 98 F | RESPIRATION RATE: 20 BRPM | OXYGEN SATURATION: 100 % | DIASTOLIC BLOOD PRESSURE: 72 MMHG | HEIGHT: 64 IN

## 2021-08-05 DIAGNOSIS — L97.919 VARICOSE VEINS OF RIGHT LOWER EXTREMITY WITH ULCER OF UNSPECIFIED SITE: ICD-10-CM

## 2021-08-05 DIAGNOSIS — I83.019 VARICOSE VEINS OF RIGHT LOWER EXTREMITY WITH ULCER OF UNSPECIFIED SITE: ICD-10-CM

## 2021-08-05 DIAGNOSIS — Z89.512 ACQUIRED ABSENCE OF LEFT LEG BELOW KNEE: Chronic | ICD-10-CM

## 2021-08-05 DIAGNOSIS — L03.115 CELLULITIS OF RIGHT LOWER LIMB: ICD-10-CM

## 2021-08-05 LAB
ALBUMIN SERPL ELPH-MCNC: 1.9 G/DL — LOW (ref 3.5–5.2)
ALP SERPL-CCNC: 175 U/L — HIGH (ref 30–115)
ALT FLD-CCNC: 30 U/L — SIGNIFICANT CHANGE UP (ref 0–41)
ANION GAP SERPL CALC-SCNC: 11 MMOL/L — SIGNIFICANT CHANGE UP (ref 7–14)
APTT BLD: 31.5 SEC — SIGNIFICANT CHANGE UP (ref 27–39.2)
AST SERPL-CCNC: 63 U/L — HIGH (ref 0–41)
BASOPHILS # BLD AUTO: 0.03 K/UL — SIGNIFICANT CHANGE UP (ref 0–0.2)
BASOPHILS NFR BLD AUTO: 0.3 % — SIGNIFICANT CHANGE UP (ref 0–1)
BILIRUB SERPL-MCNC: 1.7 MG/DL — HIGH (ref 0.2–1.2)
BLD GP AB SCN SERPL QL: SIGNIFICANT CHANGE UP
BUN SERPL-MCNC: 38 MG/DL — HIGH (ref 10–20)
CALCIUM SERPL-MCNC: 7.9 MG/DL — LOW (ref 8.5–10.1)
CHLORIDE SERPL-SCNC: 108 MMOL/L — SIGNIFICANT CHANGE UP (ref 98–110)
CO2 SERPL-SCNC: 15 MMOL/L — LOW (ref 17–32)
CREAT SERPL-MCNC: 3 MG/DL — HIGH (ref 0.7–1.5)
EOSINOPHIL # BLD AUTO: 0.04 K/UL — SIGNIFICANT CHANGE UP (ref 0–0.7)
EOSINOPHIL NFR BLD AUTO: 0.4 % — SIGNIFICANT CHANGE UP (ref 0–8)
GLUCOSE SERPL-MCNC: 100 MG/DL — HIGH (ref 70–99)
HCT VFR BLD CALC: 27.8 % — LOW (ref 42–52)
HGB BLD-MCNC: 10.1 G/DL — LOW (ref 14–18)
IMM GRANULOCYTES NFR BLD AUTO: 0.7 % — HIGH (ref 0.1–0.3)
INR BLD: 1.4 RATIO — HIGH (ref 0.65–1.3)
LYMPHOCYTES # BLD AUTO: 0.79 K/UL — LOW (ref 1.2–3.4)
LYMPHOCYTES # BLD AUTO: 7.9 % — LOW (ref 20.5–51.1)
MCHC RBC-ENTMCNC: 36.3 G/DL — SIGNIFICANT CHANGE UP (ref 32–37)
MCHC RBC-ENTMCNC: 37.4 PG — HIGH (ref 27–31)
MCV RBC AUTO: 103 FL — HIGH (ref 80–94)
MONOCYTES # BLD AUTO: 0.81 K/UL — HIGH (ref 0.1–0.6)
MONOCYTES NFR BLD AUTO: 8.1 % — SIGNIFICANT CHANGE UP (ref 1.7–9.3)
NEUTROPHILS # BLD AUTO: 8.3 K/UL — HIGH (ref 1.4–6.5)
NEUTROPHILS NFR BLD AUTO: 82.6 % — HIGH (ref 42.2–75.2)
NRBC # BLD: 0 /100 WBCS — SIGNIFICANT CHANGE UP (ref 0–0)
PLATELET # BLD AUTO: 141 K/UL — SIGNIFICANT CHANGE UP (ref 130–400)
POTASSIUM SERPL-MCNC: 3.9 MMOL/L — SIGNIFICANT CHANGE UP (ref 3.5–5)
POTASSIUM SERPL-SCNC: 3.9 MMOL/L — SIGNIFICANT CHANGE UP (ref 3.5–5)
PROT SERPL-MCNC: 7.5 G/DL — SIGNIFICANT CHANGE UP (ref 6–8)
PROTHROM AB SERPL-ACNC: 16 SEC — HIGH (ref 9.95–12.87)
RBC # BLD: 2.7 M/UL — LOW (ref 4.7–6.1)
RBC # FLD: 13.4 % — SIGNIFICANT CHANGE UP (ref 11.5–14.5)
SARS-COV-2 RNA SPEC QL NAA+PROBE: SIGNIFICANT CHANGE UP
SODIUM SERPL-SCNC: 134 MMOL/L — LOW (ref 135–146)
WBC # BLD: 10.04 K/UL — SIGNIFICANT CHANGE UP (ref 4.8–10.8)
WBC # FLD AUTO: 10.04 K/UL — SIGNIFICANT CHANGE UP (ref 4.8–10.8)

## 2021-08-05 PROCEDURE — 99223 1ST HOSP IP/OBS HIGH 75: CPT

## 2021-08-05 PROCEDURE — 71045 X-RAY EXAM CHEST 1 VIEW: CPT | Mod: 26

## 2021-08-05 PROCEDURE — 99285 EMERGENCY DEPT VISIT HI MDM: CPT

## 2021-08-05 PROCEDURE — 99213 OFFICE O/P EST LOW 20 MIN: CPT

## 2021-08-05 PROCEDURE — 93010 ELECTROCARDIOGRAM REPORT: CPT

## 2021-08-05 RX ORDER — HEPARIN SODIUM 5000 [USP'U]/ML
5000 INJECTION INTRAVENOUS; SUBCUTANEOUS EVERY 8 HOURS
Refills: 0 | Status: DISCONTINUED | OUTPATIENT
Start: 2021-08-05 | End: 2021-08-10

## 2021-08-05 RX ORDER — CHLORHEXIDINE GLUCONATE 213 G/1000ML
1 SOLUTION TOPICAL
Refills: 0 | Status: DISCONTINUED | OUTPATIENT
Start: 2021-08-05 | End: 2021-08-10

## 2021-08-05 RX ORDER — METOPROLOL TARTRATE 50 MG
25 TABLET ORAL DAILY
Refills: 0 | Status: DISCONTINUED | OUTPATIENT
Start: 2021-08-05 | End: 2021-08-10

## 2021-08-05 RX ORDER — SODIUM CHLORIDE 9 MG/ML
1000 INJECTION INTRAMUSCULAR; INTRAVENOUS; SUBCUTANEOUS
Refills: 0 | Status: DISCONTINUED | OUTPATIENT
Start: 2021-08-05 | End: 2021-08-06

## 2021-08-05 RX ORDER — COLLAGENASE CLOSTRIDIUM HIST. 250 UNIT/G
1 OINTMENT (GRAM) TOPICAL
Refills: 0 | Status: DISCONTINUED | OUTPATIENT
Start: 2021-08-05 | End: 2021-08-09

## 2021-08-05 RX ORDER — AMPICILLIN SODIUM AND SULBACTAM SODIUM 250; 125 MG/ML; MG/ML
3 INJECTION, POWDER, FOR SUSPENSION INTRAMUSCULAR; INTRAVENOUS ONCE
Refills: 0 | Status: COMPLETED | OUTPATIENT
Start: 2021-08-05 | End: 2021-08-05

## 2021-08-05 RX ORDER — ACETAMINOPHEN 500 MG
650 TABLET ORAL EVERY 6 HOURS
Refills: 0 | Status: DISCONTINUED | OUTPATIENT
Start: 2021-08-05 | End: 2021-08-10

## 2021-08-05 RX ORDER — SODIUM HYPOCHLORITE 0.125 %
1 SOLUTION, NON-ORAL MISCELLANEOUS
Refills: 0 | Status: DISCONTINUED | OUTPATIENT
Start: 2021-08-05 | End: 2021-08-10

## 2021-08-05 RX ADMIN — HEPARIN SODIUM 5000 UNIT(S): 5000 INJECTION INTRAVENOUS; SUBCUTANEOUS at 21:05

## 2021-08-05 RX ADMIN — SODIUM CHLORIDE 70 MILLILITER(S): 9 INJECTION INTRAMUSCULAR; INTRAVENOUS; SUBCUTANEOUS at 16:12

## 2021-08-05 RX ADMIN — AMPICILLIN SODIUM AND SULBACTAM SODIUM 200 GRAM(S): 250; 125 INJECTION, POWDER, FOR SUSPENSION INTRAMUSCULAR; INTRAVENOUS at 17:23

## 2021-08-05 NOTE — H&P ADULT - NSHPPHYSICALEXAM_GEN_ALL_CORE
GENERAL: NAD, speaks in full sentences, no signs of respiratory distress  HEAD:  Atraumatic, Normocephalic  EYES: EOMI, PERRLA, anicteric sclera  NECK: Supple, No JVD  CHEST/LUNG: Clear to auscultation bilaterally; No wheeze; No crackles; No accessory muscles used  HEART: Regular rate and rhythm; No murmurs;   ABDOMEN: Soft, Nontender, Nondistended; Bowel sounds present; No guarding  EXTREMITIES:  2+ Peripheral Pulses, No cyanosis or edema  PSYCH: AAOx3  NEUROLOGY: non-focal  SKIN: No rashes or lesions GENERAL: NAD, speaks in full sentences, no signs of respiratory distress  HEAD:  Atraumatic, Normocephalic  EYES: EOMI, PERRLA,  NECK: Supple  CHEST/LUNG: Clear to auscultation bilaterally; No wheeze; No crackles; No accessory muscles used  HEART: Regular rate and rhythm; No murmurs;   ABDOMEN: Soft, Nontender, Nondistended; Bowel sounds present; No guarding  EXTREMITIES:  RLE wound with dressing in place  PSYCH: AAOx3  NEUROLOGY: non-focal

## 2021-08-05 NOTE — ED PROVIDER NOTE - ATTENDING CONTRIBUTION TO CARE
I personally evaluated patient. I agree with the findings and plan with all documentation on chart except as documented  in my note.    66 y/o M PMHx HTN, left-sided AKA, Chronic venous stasis and extremity wounds presenting from [facility] with a chief complaint of RLE leg wound. Patient evaluated by Vascular Attending Dr. Sunshine today and sent into the ED for admission and burn evaluation for wound and possible debridement. No fever but + drainage from wounds.    On exam, VS reviewed. Patient has open wounds to lower leg with + drainage and mild surrounding erythema. IV placed, labs sent, Burn consulted. Patient given antibiotics.  Burn saw the patient in the ED and tentatively plans to do a washout in the OR and requesting further IV antibiotics. ED work up reviewed and results and plan of care discussed with patient. Patient found to have an MARLENE on blood work. Patient requires admission for further work up, monitoring, and management. Need for admission discussed with patient.

## 2021-08-05 NOTE — CONSULT NOTE ADULT - ASSESSMENT
The patient is a 65 year old male with PMHx of HTN and left-sided AKA up to the hip, presenting from [facility] with a chief complaint of RLE leg wound.       RLE venose stasis wound:   - plan for wash out in OR tomorrow, on Friday 8/6  - pre-op labs, COVID test, T&S, NPO after midnight  - IV abx    - pain management  - vascular consult  - RLE Elevation and compression

## 2021-08-05 NOTE — ED PROVIDER NOTE - CLINICAL SUMMARY MEDICAL DECISION MAKING FREE TEXT BOX
64 y/o M PMHx HTN, left-sided AKA, Chronic venous stasis and extremity wounds presenting from [facility] with a chief complaint of RLE leg wound. Patient evaluated by Vascular Attending Dr. Sunshine today and sent into the ED for admission and burn evaluation for wound and possible debridement. No fever but + drainage from wounds.    On exam, VS reviewed. Patient has open wounds to lower leg with + drainage and mild surrounding erythema. IV placed, labs sent, Burn consulted. Patient given antibiotics.  Burn saw the patient in the ED and tentatively plans to do a washout in the OR and requesting further IV antibiotics. ED work up reviewed and results and plan of care discussed with patient. Patient found to have an MARLENE on blood work. Patient requires admission for further work up, monitoring, and management. Need for admission discussed with patient.

## 2021-08-05 NOTE — ED PROVIDER NOTE - PROGRESS NOTE DETAILS
Judy: Spoke with burn consult, who will see and evaluate the patient. Judy: Spoke with patient following Hgb decrease of 3 from discharge on 7/24, using [employer] ([id number]). Patient has not been having any lightheadedness, dizziness, abdominal pain, nausea, diarrhea, blood in his stool or abnormal bowel movements, or bleeding from anywhere. Judy: Spoke with SHELL Rogers of burn, who says that the patient was evaluated and would not benefit from debridement or surgical intervention. Agreeable with plan for medical admission and being followed by vascular. Judy: Spoke with patient following Hgb decrease of 3 from discharge on 7/24, using [employer] ([id number]). Patient has not been having any lightheadedness, dizziness, abdominal pain, nausea, diarrhea, blood in his stool or abnormal bowel movements, or bleeding from anywhere.    Later 14:43 w/  ([id number]) spoke with patient regarding a JAYASHREE to evaluate for blood, to which patient refused.

## 2021-08-05 NOTE — ED ADULT NURSE NOTE - NSIMPLEMENTINTERV_GEN_ALL_ED
Implemented All Fall with Harm Risk Interventions:  [place] to call system. Call bell, personal items and telephone within reach. Instruct patient to call for assistance. Room bathroom lighting operational. Non-slip footwear when patient is off stretcher. Physically safe environment: no spills, clutter or unnecessary equipment. Stretcher in lowest position, wheels locked, appropriate side rails in place. Provide visual cue, wrist band, yellow gown, etc. Monitor gait and stability. Monitor for mental status changes and reorient to person, place, and time. Review medications for side effects contributing to fall risk. Reinforce activity limits and safety measures with patient and family. Provide visual clues: red socks.

## 2021-08-05 NOTE — ED PROVIDER NOTE - NS ED ROS FT
Constitutional:  No fever, chills, lethargy, or abnormal weight loss  Eyes:  No eye pain or visual changes  ENMT: No nasal discharge, no sore throat. No neck pain or stiffness  Cardiac:  No chest pain or palpitations  Respiratory:  No cough or respiratory distress  GI:  No nausea, vomiting, diarrhea or abdominal pain  :  No dysuria, frequency, or hematuria  MS:  No back or joint pain +leg wound  Neuro:  No headache. No numbness, weakness, or tingling  Skin:  No skin rash or pruritus.   Except as documented in the HPI,  all other systems are negative

## 2021-08-05 NOTE — ED PROVIDER NOTE - OBJECTIVE STATEMENT
The patient is a 65 year old male with PMHx of HTN and left-sided AKA up to the hip, presenting from [facility] with a chief complaint of RLE leg wound. The patient was admitted to medicine and discharged 7/25 after being found to have overlying cellulitis of venous stasis ulcer resulting in sepsis. After discharge he completed his oral antibiotics, but was sent in by Dr Sunshine (vascular) with instructions to consult burn (Dr Hutchison) and admission. Currently he has no other complaints; the wound is only painful with wound dressing changes; no recent fevers, chills, CP/SOB, AP/N/V/D, urinary symptoms, or any other complaints at this time.

## 2021-08-05 NOTE — H&P ADULT - NSHPLABSRESULTS_GEN_ALL_CORE
10.1   10.04 )-----------( 141      ( 05 Aug 2021 10:45 )             27.8       08-05    134<L>  |  108  |  38<H>  ----------------------------<  100<H>  3.9   |  15<L>  |  3.0<H>    Ca    7.9<L>      05 Aug 2021 10:45    TPro  7.5  /  Alb  1.9<L>  /  TBili  1.7<H>  /  DBili  x   /  AST  63<H>  /  ALT  30  /  AlkPhos  175<H>  08-05                  PT/INR - ( 05 Aug 2021 10:45 )   PT: 16.00 sec;   INR: 1.40 ratio         PTT - ( 05 Aug 2021 10:45 )  PTT:31.5 sec    Lactate Trend            CAPILLARY BLOOD GLUCOSE

## 2021-08-05 NOTE — H&P ADULT - ASSESSMENT
The patient is a 65 year old male with PMHx of HTN and left-sided AKA up to the hip, presenting from [facility] with a chief complaint of RLE leg wound.     #RLE nonhealing wounds 2/2 chronic venous stasis  - no sepsis on admission, sent by Dr. Sunshine for further eval  - H/o of pseudomonas and staph aureus in RLE wound --> recent d/c for RLE cellulitis, was on vanc and cefepime and d/c on augmentin and doxycycline  - Burn evaluated patient in ED and don't recommend debridement wound care only  - F/u Vascular consult with Dr. Sunshine    #MARLENE  #hyponatremia (chronic)  - Cr 3.0 on admission, baseline 0.9 -1.0  - Start IVF  - ordered UA and urine lytes    #Macrocytic anemia  - Hb drop from 13 to 10 on admission  - no signs of active bleeding  - Refused JAYASHREE, keep active t/s  - Ordered B12 and folate    #HTN      DVT ppx: lovenox  GI ppx: ppi  Diet: DASH  Dispo: acute    The patient is a 65 year old male with PMHx of HTN and left-sided AKA up to the hip, presenting from [facility] with a chief complaint of RLE leg wound.     #RLE nonhealing wounds 2/2 chronic venous stasis  - no sepsis on admission, sent by Dr. Sunshine for further eval  - H/o of pseudomonas and staph aureus in RLE wound --> recent d/c for RLE cellulitis, was on vanc and cefepime and d/c on augmentin and doxycycline  - Burn evaluated patient in ED and don't recommend debridement wound care only  - F/u Vascular consult with Dr. Sunshine    #MARLENE  #hyponatremia (chronic)  - Cr 3.0 on admission, baseline 0.9 -1.0  - Start NS @70cc/hr  - ordered UA and urine lytes    #Macrocytic anemia  - Hb drop from 13 to 10 on admission  - no signs of active bleeding  - Refused JAYASHREE, keep active t/s  - Ordered B12 and folate    #HTN  - on toprol, aldactone and lasix at home  - held aldactone and lasix due to MARLENE, c/w toprol    DVT ppx: heparin  GI ppx: not indicated  Diet: DASH  Dispo: acute    The patient is a 65 year old male with PMHx of HTN and left-sided AKA up to the hip, presenting from [facility] with a chief complaint of RLE leg wound.     #RLE nonhealing wounds 2/2 chronic venous stasis  - no sepsis on admission, sent by Dr. Sunshine for further eval  - H/o of pseudomonas and staph aureus in RLE wound --> recent d/c for RLE cellulitis, was on vanc and cefepime and d/c on augmentin and doxycycline  - Burn evaluated patient in ED and don't recommend debridement wound care only  - Wound does not appear infected, hold off of on abx  - F/u Vascular consult with Dr. Sunshine  - Ordered ESR and CRP    #MARLENE  #hyponatremia (chronic)  - Cr 3.0 on admission, baseline 0.9 -1.0  - Start NS @70cc/hr  - ordered UA and urine lytes    #Macrocytic anemia  - Hb drop from 13 to 10 on admission  - no signs of active bleeding  - Refused JAYASHREE, keep active t/s  - Ordered B12 and folate    #HTN  - on toprol, aldactone and lasix at home  - held aldactone and lasix due to MARLENE, c/w toprol    DVT ppx: heparin  GI ppx: not indicated  Diet: DASH  Dispo: acute

## 2021-08-05 NOTE — ED PROVIDER NOTE - PHYSICAL EXAMINATION
VITAL SIGNS: I have reviewed nursing notes and confirm.  CONSTITUTIONAL: Well-appearing, non-toxic, obese male in NAD  SKIN: Warm dry, normal skin turgor. There is a large wound to the RLE; the largest area if about 10x5 cm to the lower medial leg with scant serosanguinous drainage. There is a 6x4 cm would to lower lateral leg. 2x1 cm wound to dorsal foot. No overlying warmth, erythema, or signs of infection. Wound is non tender to touch.   HEAD: NCAT  EYES: Pale conjunctival injection, scleral anicteric  ENT: Moist mucous membranes, normal pharynx with no erythema or exudates  NECK: Supple; non tender. Full ROM. No cervical LAD  CARD: RRR, no murmurs, rubs or gallops  RESP: Clear to ausculation bilaterally.  No rales, rhonchi, or wheezing.  ABD: Soft, non-distended, non-tender, no rebound or guarding. No CVA tenderness  EXT: Full ROM, no bony tenderness, no pedal edema, no calf tenderness. There is an AKA extending to the hip on the left side.   NEURO: Normal motor, normal sensory. CN II-XII grossly intact. Cerebellar testing normal. Normal gait.  PSYCH: Cooperative, appropriate. VITAL SIGNS: I have reviewed nursing notes and confirm.  CONSTITUTIONAL: Well-appearing, non-toxic, obese male in NAD  SKIN: Warm dry, normal skin turgor. There is a large wound to the RLE; the largest area if about 10x5 cm to the lower medial leg with scant serosanguinous drainage. There is a 6x4 cm would to lower lateral leg. 2x1 cm wound to dorsal foot. No overlying warmth, erythema, or signs of infection. Wound is non tender to touch.   HEAD: NCAT  EYES: Pale conjunctival injection, scleral anicteric  ENT: Moist mucous membranes, normal pharynx with no erythema or exudates  NECK: Supple; non tender. Full ROM. No cervical LAD  CARD: RRR, no murmurs, rubs or gallops  RESP: Clear to ausculation bilaterally.  No rales, rhonchi, or wheezing.  ABD: Soft, non-distended, non-tender, no rebound or guarding. No CVA tenderness  EXT: Full ROM, no bony tenderness, no pedal edema, no calf tenderness. There is an AKA extending to the hip on the left side.   NEURO: Normal motor, normal sensory. CN II-XII grossly intact. Cerebellar testing normal. Patient wheelchair-bound, gait not assessed.  PSYCH: Cooperative, appropriate.

## 2021-08-05 NOTE — CONSULT NOTE ADULT - SUBJECTIVE AND OBJECTIVE BOX
65y  Male  HPI:  The patient is a 65 year old male with PMHx of HTN and left-sided AKA up to the hip, presenting from [facility] with a chief complaint of RLE leg wound. The patient was admitted to medicine and discharged 7/25 after being found to have overlying cellulitis of venous stasis ulcer resulting in sepsis. After discharge he completed his oral antibiotics, but was sent in by Dr Sunshine (vascular) with instructions to consult burn (Dr Hutchison) and admission.   BURN consult requested for evaluation of RLE wound.     Vital Signs Last 24 Hrs  T(C): 36.4 (05 Aug 2021 09:59), Max: 36.4 (05 Aug 2021 09:59)  T(F): 97.5 (05 Aug 2021 09:59), Max: 97.5 (05 Aug 2021 09:59)  HR: 91 (05 Aug 2021 09:59) (91 - 91)  BP: 152/72 (05 Aug 2021 09:59) (152/72 - 152/72)  RR: 20 (05 Aug 2021 09:59) (20 - 20)  SpO2: 100% (05 Aug 2021 09:59) (100% - 100%)   (05 Aug 2021 15:17)    Allergies  No Known Allergies    PAST MEDICAL & SURGICAL HISTORY:  Hypertension    GERD (gastroesophageal reflux disease)    Edema    Circulation disorder of lower extremity  left lower extremity    S/P BKA (below knee amputation), left      Labs:                      10.1   10.04 )-----------( 141      ( 05 Aug 2021 10:45 )             27.8     PHYSICAL EXAM:  GENERAL: seen on bedside, A&O x 3, not in acute distress, cooperative    Wounds: large full thickness wound to RLE, with the largest area if about 10x5 cm to the lower medial leg with   serosanguinous drainage. There is a 6x4 cm would to lower lateral leg. 2x1 cm wound to dorsal foot. With areas of yellowish   thick exudate; Skin thickening and hyperpigmentation. No warmth, no pus drainage.     65y  Male  HPI:  The patient is a 65 year old male with PMHx of HTN and left-sided AKA up to the hip, presenting from [facility] with a chief complaint of RLE leg wound. The patient was admitted to medicine and discharged 7/25 after being found to have overlying cellulitis of venous stasis ulcer resulting in sepsis. After discharge he completed his oral antibiotics, but was sent in by Dr Sunshine (vascular) with instructions to consult burn (Dr Hutchison) and admission.   BURN consult requested for evaluation of RLE wound.     Vital Signs Last 24 Hrs  T(C): 36.4 (05 Aug 2021 09:59), Max: 36.4 (05 Aug 2021 09:59)  T(F): 97.5 (05 Aug 2021 09:59), Max: 97.5 (05 Aug 2021 09:59)  HR: 91 (05 Aug 2021 09:59) (91 - 91)  BP: 152/72 (05 Aug 2021 09:59) (152/72 - 152/72)  RR: 20 (05 Aug 2021 09:59) (20 - 20)  SpO2: 100% (05 Aug 2021 09:59) (100% - 100%)   (05 Aug 2021 15:17)    Allergies  No Known Allergies    PAST MEDICAL & SURGICAL HISTORY:  Hypertension    GERD (gastroesophageal reflux disease)    Edema    Circulation disorder of lower extremity  left lower extremity    S/P BKA (below knee amputation), left      Labs:                      10.1   10.04 )-----------( 141      ( 05 Aug 2021 10:45 )             27.8     PHYSICAL EXAM:  GENERAL: seen on bedside, A&O x 3, not in acute distress, cooperative    Wounds: large full thickness wound to RLE, with the largest area if about 14 x5 cm to the lower medial leg with   serosanguinous drainage. There is a 6x4 cm would to lower lateral leg. 4x2 cm wound to dorsal foot. With areas of yellowish   thick exudate; Skin thickening and hyperpigmentation. No warmth, no pus drainage.

## 2021-08-05 NOTE — HISTORY OF PRESENT ILLNESS
[FreeTextEntry1] : 66 y/o gentleman with h/o L AKA about 20 years ago in [place], h/o RLE DVT, now with large chronic ulcerations to right, was admitted  June 2021 for sepsis due to right lower extremity cellulitis and lymphangitis, was discharged on Doxycycline and Augmentin.\par \par Completed second course of oral antibiotics, pain is worse today, c/o chills, no fever, RLE wounds are larger.

## 2021-08-05 NOTE — H&P ADULT - ATTENDING COMMENTS
Patient seen and examined on 8/05/2021 at 22:02     HPI:   65 year old gentleman with a PMHx of HTN and left-sided AKA, [facility] leg [facility].   He was recently admitted for RLE cellulitis and venous stasis ulcers, treated with cefepime and vanco, completed Amoxicillin and Doxy as an outpatient. He was seen today by vascular surgery and referred to the ED to be further evaluated by burn for possible debridement.   Burn saw the patient in the ED and tentatively plans to do a washout in the OR and requesting further IV antibiotics.   He denied any fevers, though notes he has had some bloody drainage from the wound during dressing changes.   Hb noted to be lower than baseline but he denied any significant bleeding other than the occasional bleeding from his wound. no melena ,hematochezia, hematuria, or hematemesis.   Additionally he was noted to have and MARLENE but he reports no difficulty with urination, back pain, decrease in oral intake, nor urinary output.     REVIEW OF SYSTEMS:  CONSTITUTIONAL:  No weakness, fevers, chills, night sweats, weight loss  EYES/ENT: No visual changes. No vertigo or dysphagia  NECK: No neck pain or stiffness  RESPIRATORY: No cough, wheezing, hemoptysis. No shortness of breath  CARDIOVASCULAR: No chest pain or palpitations. No lower extremity edema  GASTROINTESTINAL: No abdominal pain. No nausea, vomiting, diarrhea, or hematemesis  GENITOURINARY: No dysuria or hematuria   NEUROLOGICAL: No focal numbness or weakness  SKIN: +RLE ulceration   HEMATOLOGIC: No easy bruising or prolonged bleeding.      PHYSICAL EXAM:  GENERAL: NAD, obese, Non-toxic, stated age   HEAD:  Atraumatic, Normocephalic  EYES: EOMI, Sclera White   NECK: Supple, No JVD  CHEST/LUNG: Clear to auscultation bilaterally; No wheezing, rhonchi, or crackles  HEART: Regular rate and rhythm; s1, s2, 2/6 systolic murmurs, rubs, or gallops  ABDOMEN: Soft, Nontender, Nondistended; Bowel sounds present, No rebound or guarding noted   EXTREMITIES:  LLE AKA up to mid thigh. No RLE lower extremity edema or calf tenderness to palpation.  No clubbing or cyanosis. Toe warm (see skin exam for more details)  PSYCH: AAOx3, pleasant, cooperative, not anxious  NEUROLOGY: 5/5 RLE and b/l UE strength and sensation intact  SKIN: multiple venous stasis ulceration of the RLE with granulation tissue, Serosanguinous drainage noted on dressings. Foul odor appreciated on dressing change.       ASSESSMENT AND PLAN:  RLE venous stasis ulceration +/- cellulitis: SIRS not present on admission  -Burn following: tentatively scheduled for washout in OR on 8/6  -Hx of pseudomonas and Staph A. wound cultures  -Cont with Unasyn and cefepime for now. D/C if procal is negative   -ID Eval  -Follow up blood cultures, procal  -Vascular Eval   -Local wound care as per vascular     MARLENE: (?ATN secondary to Abx vs Pre-renal from diuretic use): cont with IV fluids for now.   -Trend Cr, monitor strict I/O  -Check Urine Na, Cl, urea, urine Pr:Cr, UA with casts, and Renal US. Nephrology evaluation.   -Avoid nephrotoxic medications. Holding aldactone and lasix until renal function improved     Macrocytic anemia: no clear evidence of bleeding other than small amount from wound  -Monitor for signs of hemorrhage, refused JAYASHREE   -check iron studies, aB12, and Folate.     Transaminitis: hemolyzed, repeat     HTN: cont with metoprolol and can start nifedipine 60 mg qD if hypertensive.     Chronic hyponatremia: monitor Na levels     DVT ppx: hold heparin until Hb is stable   GI ppx: Not indicated  GOC: Full code.    My note supersedes the residents in the event of a discrepancy. Patient seen and examined on 8/05/2021 at 22:02     HPI:   65 year old gentleman with a PMHx of HTN and left-sided AKA, presenting from [facility] with a chief complaint of RLE leg wound.   He was recently admitted for RLE cellulitis and venous stasis ulcers, treated with cefepime and vanco, completed Amoxicillin and Doxy as an outpatient. He was seen today by vascular surgery and referred to the ED to be further evaluated by burn for possible debridement.   Burn saw the patient in the ED and tentatively plans to do a washout in the OR and requesting further IV antibiotics.   He denied any fevers, though notes he has had some bloody drainage from the wound during dressing changes.   Hb noted to be lower than baseline but he denied any significant bleeding other than the occasional bleeding from his wound. no melena ,hematochezia, hematuria, or hematemesis.   Additionally he was noted to have and MARLENE but he reports no difficulty with urination, back pain, decrease in oral intake, nor urinary output.     REVIEW OF SYSTEMS:  CONSTITUTIONAL:  No weakness, fevers, chills, night sweats, weight loss  EYES/ENT: No visual changes. No vertigo or dysphagia  NECK: No neck pain or stiffness  RESPIRATORY: No cough, wheezing, hemoptysis. No shortness of breath  CARDIOVASCULAR: No chest pain or palpitations. No lower extremity edema  GASTROINTESTINAL: No abdominal pain. No nausea, vomiting, diarrhea, or hematemesis  GENITOURINARY: No dysuria or hematuria   NEUROLOGICAL: No focal numbness or weakness  SKIN: +RLE ulceration   HEMATOLOGIC: No easy bruising or prolonged bleeding.      PHYSICAL EXAM:  GENERAL: NAD, obese, Non-toxic, stated age   HEAD:  Atraumatic, Normocephalic  EYES: EOMI, Sclera White   NECK: Supple, No JVD  CHEST/LUNG: Clear to auscultation bilaterally; No wheezing, rhonchi, or crackles  HEART: Regular rate and rhythm; s1, s2, 2/6 systolic murmurs, rubs, or gallops  ABDOMEN: Soft, Nontender, Nondistended; Bowel sounds present, No rebound or guarding noted   EXTREMITIES:  LLE AKA up to mid thigh. No RLE lower extremity edema or calf tenderness to palpation.  No clubbing or cyanosis. Toe warm (see skin exam for more details)  PSYCH: AAOx3, pleasant, cooperative, not anxious  NEUROLOGY: 5/5 RLE and b/l UE strength and sensation intact  SKIN: multiple venous stasis ulceration of the RLE with granulation tissue, Serosanguinous drainage noted on dressings. Foul odor appreciated on dressing change.       ASSESSMENT AND PLAN:  RLE venous stasis ulceration +/- cellulitis: SIRS not present on admission  -Burn following: tentatively scheduled for washout in OR on 8/6  -Hx of pseudomonas and Staph A. wound cultures  -Cont with Unasyn and cefepime for now. D/C if procal is negative   -ID Eval  -Follow up blood cultures, procal  -Vascular Eval   -Local wound care as per vascular     MARLENE: (?ATN secondary to Abx vs Pre-renal from diuretic use): cont with IV fluids for now.   -Trend Cr, monitor strict I/O  -Check Urine Na, Cl, urea, urine Pr:Cr, UA with casts, and Renal US. Nephrology evaluation.   -Avoid nephrotoxic medications. Holding aldactone and lasix until renal function improved     Macrocytic anemia: no clear evidence of bleeding other than small amount from wound  -Monitor for signs of hemorrhage, refused JAYASHREE   -check iron studies, aB12, and Folate.     Transaminitis: hemolyzed, repeat     HTN: cont with metoprolol and can start nifedipine 60 mg qD if hypertensive.     Chronic hyponatremia: monitor Na levels  -on IVF     DVT ppx: hold heparin until Hb is stable   GI ppx: Not indicated  GOC: Full code.    My note supersedes the residents in the event of a discrepancy. Patient seen and examined on 8/05/2021 at 22:02     HPI:   65 year old gentleman with a PMHx of HTN and left-sided AKA, presenting from [facility] with a chief complaint of RLE leg wound.   He was recently admitted for RLE cellulitis and venous stasis ulcers, treated with cefepime and vanco, completed Amoxicillin and Doxy as an outpatient. He was seen today by vascular surgery and referred to the ED to be further evaluated by burn for possible debridement.   Burn saw the patient in the ED and tentatively plans to do a washout in the OR and requesting further IV antibiotics.   He denied any fevers, though notes he has had some bloody drainage from the wound during dressing changes.   Hb noted to be lower than baseline but he denied any significant bleeding other than the occasional bleeding from his wound. no melena ,hematochezia, hematuria, or hematemesis.   Additionally he was noted to have and MARLENE but he reports no difficulty with urination, back pain, decrease in oral intake, nor urinary output.     REVIEW OF SYSTEMS:  CONSTITUTIONAL:  No weakness, fevers, chills, night sweats, weight loss  EYES/ENT: No visual changes. No vertigo or dysphagia  NECK: No neck pain or stiffness  RESPIRATORY: No cough, wheezing, hemoptysis. No shortness of breath  CARDIOVASCULAR: No chest pain or palpitations. No lower extremity edema  GASTROINTESTINAL: No abdominal pain. No nausea, vomiting, diarrhea, or hematemesis  GENITOURINARY: No dysuria or hematuria   NEUROLOGICAL: No focal numbness or weakness  SKIN: +RLE ulceration   HEMATOLOGIC: No easy bruising or prolonged bleeding.      PHYSICAL EXAM:  GENERAL: NAD, obese, Non-toxic, stated age   HEAD:  Atraumatic, Normocephalic  EYES: EOMI, Sclera White   NECK: Supple, No JVD  CHEST/LUNG: Clear to auscultation bilaterally; No wheezing, rhonchi, or crackles  HEART: Regular rate and rhythm; s1, s2, 2/6 systolic murmurs, rubs, or gallops  ABDOMEN: Soft, Nontender, Nondistended; Bowel sounds present, No rebound or guarding noted   EXTREMITIES:  LLE AKA up to mid thigh. No RLE lower extremity edema or calf tenderness to palpation.  No clubbing or cyanosis. Toe warm (see skin exam for more details)  PSYCH: AAOx3, pleasant, cooperative, not anxious  NEUROLOGY: 5/5 RLE and b/l UE strength and sensation intact  SKIN: multiple venous stasis ulceration of the RLE with granulation tissue, Serosanguinous drainage noted on dressings. Foul odor appreciated on dressing change.       ASSESSMENT AND PLAN:  RLE venous stasis ulceration +/- cellulitis: SIRS not present on admission  -Burn following: tentatively scheduled for washout in OR on 8/6  -Hx of pseudomonas and Staph A. wound cultures  -Observe off abx for now, wound doesn't appear grossly infected. If procal elevated start Abx, or if ID feels it is necessary   -ID Eval  -Follow up blood cultures, procal  -Vascular Eval   -Local wound care as per vascular     MARLENE: (?ATN secondary to Abx vs Pre-renal from diuretic use): cont with IV fluids for now.   -Trend Cr, monitor strict I/O  -Check Urine Na, Cl, urea, urine Pr:Cr, UA with casts, and Renal US. Nephrology evaluation.   -Avoid nephrotoxic medications. Holding aldactone and lasix until renal function improved     Macrocytic anemia: no clear evidence of bleeding other than small amount from wound  -Monitor for signs of hemorrhage, refused JAYASHREE   -check iron studies, aB12, and Folate.     Transaminitis: hemolyzed, repeat     HTN: cont with metoprolol and can start nifedipine 60 mg qD if hypertensive.     Chronic hyponatremia: monitor Na levels  -on IVF     DVT ppx: hold heparin until Hb is stable   GI ppx: Not indicated  GOC: Full code.    My note supersedes the residents in the event of a discrepancy.

## 2021-08-05 NOTE — ASSESSMENT
[FreeTextEntry1] : 64 y/o gentleman with h/o L AKA about 20 years ago in [place], h/o RLE DVT, now with large chronic ulcerations to right, was admitted  June 2021 for sepsis due to right lower extremity cellulitis and lymphangitis, was discharged on Doxycycline and Augmentin that he completed one week ago, c/o worsening pain and wounds to right LE. Denies any fever, c/o chills.\par \par I am sending him to ED for admission, Burn consult with Dr. Hutchison for wound debridement.

## 2021-08-05 NOTE — H&P ADULT - HISTORY OF PRESENT ILLNESS
The patient is a 65 year old male with PMHx of HTN and left-sided AKA up to the hip, presenting from [facility] with a chief complaint of RLE leg wound. The patient was admitted to medicine and discharged 7/25 after being found to have overlying cellulitis of venous stasis ulcer resulting in sepsis. After discharge he completed his oral antibiotics, but was sent in by Dr Sunshine (vascular) with instructions to consult burn (Dr Hutchison) and admission. Currently he has no other complaints; the wound is only painful with wound dressing changes; no recent fevers, chills, CP/SOB, AP/N/V/D, urinary symptoms, or any other complaints at this time    Vital Signs Last 24 Hrs  T(C): 36.4 (05 Aug 2021 09:59), Max: 36.4 (05 Aug 2021 09:59)  T(F): 97.5 (05 Aug 2021 09:59), Max: 97.5 (05 Aug 2021 09:59)  HR: 91 (05 Aug 2021 09:59) (91 - 91)  BP: 152/72 (05 Aug 2021 09:59) (152/72 - 152/72)  BP(mean): --  ABP: --  ABP(mean): --  RR: 20 (05 Aug 2021 09:59) (20 - 20)  SpO2: 100% (05 Aug 2021 09:59) (100% - 100%)   The patient is a 65 year old male with PMHx of HTN and left-sided AKA up to the hip, presenting from [facility] with a chief complaint of RLE leg wound. The patient was admitted to medicine and discharged 7/25 after being found to have overlying cellulitis of venous stasis ulcer resulting in sepsis. After discharge he completed his oral antibiotics, but was sent in by Dr Sunshine (vascular) with instructions to consult burn (Dr Hutchison) and admission. Currently he has no other complaints; the wound is only painful with wound dressing changes; no recent fevers, chills, CP/SOB, AP/N/V/D, urinary symptoms, or any other complaints at this time    In the ED, he was seen by the burn team and they recommended local wound care and no surgical intervention. Labs are remarkable for Hb of 10.1 (from 13), , Na 134, BUN 38, and Cr 3.0    Vital Signs Last 24 Hrs  T(C): 36.4 (05 Aug 2021 09:59), Max: 36.4 (05 Aug 2021 09:59)  T(F): 97.5 (05 Aug 2021 09:59), Max: 97.5 (05 Aug 2021 09:59)  HR: 91 (05 Aug 2021 09:59) (91 - 91)  BP: 152/72 (05 Aug 2021 09:59) (152/72 - 152/72)  BP(mean): --  ABP: --  ABP(mean): --  RR: 20 (05 Aug 2021 09:59) (20 - 20)  SpO2: 100% (05 Aug 2021 09:59) (100% - 100%)

## 2021-08-06 LAB
ALBUMIN SERPL ELPH-MCNC: 1.6 G/DL — LOW (ref 3.5–5.2)
ALBUMIN SERPL ELPH-MCNC: 1.8 G/DL — LOW (ref 3.5–5.2)
ALP SERPL-CCNC: 153 U/L — HIGH (ref 30–115)
ALP SERPL-CCNC: 166 U/L — HIGH (ref 30–115)
ALT FLD-CCNC: 27 U/L — SIGNIFICANT CHANGE UP (ref 0–41)
ALT FLD-CCNC: 28 U/L — SIGNIFICANT CHANGE UP (ref 0–41)
ANION GAP SERPL CALC-SCNC: 11 MMOL/L — SIGNIFICANT CHANGE UP (ref 7–14)
ANION GAP SERPL CALC-SCNC: 9 MMOL/L — SIGNIFICANT CHANGE UP (ref 7–14)
APPEARANCE UR: CLEAR — SIGNIFICANT CHANGE UP
AST SERPL-CCNC: 53 U/L — HIGH (ref 0–41)
AST SERPL-CCNC: 73 U/L — HIGH (ref 0–41)
BACTERIA # UR AUTO: NEGATIVE — SIGNIFICANT CHANGE UP
BASOPHILS # BLD AUTO: 0.03 K/UL — SIGNIFICANT CHANGE UP (ref 0–0.2)
BASOPHILS NFR BLD AUTO: 0.6 % — SIGNIFICANT CHANGE UP (ref 0–1)
BILIRUB SERPL-MCNC: 1.2 MG/DL — SIGNIFICANT CHANGE UP (ref 0.2–1.2)
BILIRUB SERPL-MCNC: 1.3 MG/DL — HIGH (ref 0.2–1.2)
BILIRUB UR-MCNC: NEGATIVE — SIGNIFICANT CHANGE UP
BUN SERPL-MCNC: 38 MG/DL — HIGH (ref 10–20)
BUN SERPL-MCNC: 40 MG/DL — HIGH (ref 10–20)
CALCIUM SERPL-MCNC: 7.3 MG/DL — LOW (ref 8.5–10.1)
CALCIUM SERPL-MCNC: 7.4 MG/DL — LOW (ref 8.5–10.1)
CHLORIDE SERPL-SCNC: 115 MMOL/L — HIGH (ref 98–110)
CHLORIDE SERPL-SCNC: 118 MMOL/L — HIGH (ref 98–110)
CHLORIDE UR-SCNC: 45 — SIGNIFICANT CHANGE UP
CO2 SERPL-SCNC: 12 MMOL/L — LOW (ref 17–32)
CO2 SERPL-SCNC: 16 MMOL/L — LOW (ref 17–32)
COLOR SPEC: YELLOW — SIGNIFICANT CHANGE UP
COVID-19 SPIKE DOMAIN AB INTERP: POSITIVE
COVID-19 SPIKE DOMAIN ANTIBODY RESULT: >250 U/ML — HIGH
CREAT ?TM UR-MCNC: 67 MG/DL — SIGNIFICANT CHANGE UP
CREAT SERPL-MCNC: 2.7 MG/DL — HIGH (ref 0.7–1.5)
CREAT SERPL-MCNC: 2.8 MG/DL — HIGH (ref 0.7–1.5)
CRP SERPL-MCNC: 66 MG/L — HIGH
DIFF PNL FLD: ABNORMAL
EOSINOPHIL # BLD AUTO: 0.09 K/UL — SIGNIFICANT CHANGE UP (ref 0–0.7)
EOSINOPHIL NFR BLD AUTO: 1.8 % — SIGNIFICANT CHANGE UP (ref 0–8)
EPI CELLS # UR: 1 /HPF — SIGNIFICANT CHANGE UP (ref 0–5)
ERYTHROCYTE [SEDIMENTATION RATE] IN BLOOD: 75 MM/HR — HIGH (ref 0–10)
FOLATE SERPL-MCNC: 8.9 NG/ML — SIGNIFICANT CHANGE UP
GLUCOSE BLDC GLUCOMTR-MCNC: 108 MG/DL — HIGH (ref 70–99)
GLUCOSE SERPL-MCNC: 78 MG/DL — SIGNIFICANT CHANGE UP (ref 70–99)
GLUCOSE SERPL-MCNC: 94 MG/DL — SIGNIFICANT CHANGE UP (ref 70–99)
GLUCOSE UR QL: NEGATIVE — SIGNIFICANT CHANGE UP
HCT VFR BLD CALC: 21.8 % — LOW (ref 42–52)
HCT VFR BLD CALC: 26.5 % — LOW (ref 42–52)
HGB BLD-MCNC: 7.9 G/DL — LOW (ref 14–18)
HGB BLD-MCNC: 8.6 G/DL — LOW (ref 14–18)
HYALINE CASTS # UR AUTO: 1 /LPF — SIGNIFICANT CHANGE UP (ref 0–7)
IMM GRANULOCYTES NFR BLD AUTO: 0.4 % — HIGH (ref 0.1–0.3)
INR BLD: 1.41 RATIO — HIGH (ref 0.65–1.3)
IRON SATN MFR SERPL: 54 % — HIGH (ref 15–50)
IRON SATN MFR SERPL: 64 UG/DL — SIGNIFICANT CHANGE UP (ref 35–150)
KETONES UR-MCNC: NEGATIVE — SIGNIFICANT CHANGE UP
LEUKOCYTE ESTERASE UR-ACNC: NEGATIVE — SIGNIFICANT CHANGE UP
LYMPHOCYTES # BLD AUTO: 0.71 K/UL — LOW (ref 1.2–3.4)
LYMPHOCYTES # BLD AUTO: 14.3 % — LOW (ref 20.5–51.1)
MAGNESIUM SERPL-MCNC: 1.6 MG/DL — LOW (ref 1.8–2.4)
MCHC RBC-ENTMCNC: 32.5 G/DL — SIGNIFICANT CHANGE UP (ref 32–37)
MCHC RBC-ENTMCNC: 34.1 PG — HIGH (ref 27–31)
MCHC RBC-ENTMCNC: 36.2 G/DL — SIGNIFICANT CHANGE UP (ref 32–37)
MCHC RBC-ENTMCNC: 38.2 PG — HIGH (ref 27–31)
MCV RBC AUTO: 105.2 FL — HIGH (ref 80–94)
MCV RBC AUTO: 105.3 FL — HIGH (ref 80–94)
MONOCYTES # BLD AUTO: 0.49 K/UL — SIGNIFICANT CHANGE UP (ref 0.1–0.6)
MONOCYTES NFR BLD AUTO: 9.9 % — HIGH (ref 1.7–9.3)
NEUTROPHILS # BLD AUTO: 3.61 K/UL — SIGNIFICANT CHANGE UP (ref 1.4–6.5)
NEUTROPHILS NFR BLD AUTO: 73 % — SIGNIFICANT CHANGE UP (ref 42.2–75.2)
NITRITE UR-MCNC: NEGATIVE — SIGNIFICANT CHANGE UP
NRBC # BLD: 0 /100 WBCS — SIGNIFICANT CHANGE UP (ref 0–0)
NRBC # BLD: 0 /100 WBCS — SIGNIFICANT CHANGE UP (ref 0–0)
OSMOLALITY UR: 366 MOS/KG — SIGNIFICANT CHANGE UP (ref 50–1200)
PH UR: 6.5 — SIGNIFICANT CHANGE UP (ref 5–8)
PLATELET # BLD AUTO: 79 K/UL — LOW (ref 130–400)
PLATELET # BLD AUTO: 90 K/UL — LOW (ref 130–400)
POTASSIUM SERPL-MCNC: 3.4 MMOL/L — LOW (ref 3.5–5)
POTASSIUM SERPL-MCNC: 4.4 MMOL/L — SIGNIFICANT CHANGE UP (ref 3.5–5)
POTASSIUM SERPL-SCNC: 3.4 MMOL/L — LOW (ref 3.5–5)
POTASSIUM SERPL-SCNC: 4.4 MMOL/L — SIGNIFICANT CHANGE UP (ref 3.5–5)
POTASSIUM UR-SCNC: 34 MMOL/L — SIGNIFICANT CHANGE UP
PROCALCITONIN SERPL-MCNC: 0.58 NG/ML — HIGH (ref 0.02–0.1)
PROT ?TM UR-MCNC: 49 MG/DLG/24H — SIGNIFICANT CHANGE UP
PROT SERPL-MCNC: 6.4 G/DL — SIGNIFICANT CHANGE UP (ref 6–8)
PROT SERPL-MCNC: 6.9 G/DL — SIGNIFICANT CHANGE UP (ref 6–8)
PROT UR-MCNC: ABNORMAL
PROT/CREAT UR-RTO: 0.7 RATIO — HIGH (ref 0–0.2)
PROTHROM AB SERPL-ACNC: 16.2 SEC — HIGH (ref 9.95–12.87)
RBC # BLD: 2.07 M/UL — LOW (ref 4.7–6.1)
RBC # BLD: 2.52 M/UL — LOW (ref 4.7–6.1)
RBC # FLD: 13.8 % — SIGNIFICANT CHANGE UP (ref 11.5–14.5)
RBC # FLD: 14 % — SIGNIFICANT CHANGE UP (ref 11.5–14.5)
RBC CASTS # UR COMP ASSIST: 431 /HPF — HIGH (ref 0–4)
SARS-COV-2 IGG+IGM SERPL QL IA: >250 U/ML — HIGH
SARS-COV-2 IGG+IGM SERPL QL IA: POSITIVE
SODIUM SERPL-SCNC: 140 MMOL/L — SIGNIFICANT CHANGE UP (ref 135–146)
SODIUM SERPL-SCNC: 141 MMOL/L — SIGNIFICANT CHANGE UP (ref 135–146)
SODIUM UR-SCNC: 60 MMOL/L — SIGNIFICANT CHANGE UP
SODIUM UR-SCNC: 60 MMOL/L — SIGNIFICANT CHANGE UP
SP GR SPEC: 1.01 — SIGNIFICANT CHANGE UP (ref 1.01–1.03)
TIBC SERPL-MCNC: 118 UG/DL — LOW (ref 220–430)
TRANSFERRIN SERPL-MCNC: 86 MG/DL — LOW (ref 200–360)
UIBC SERPL-MCNC: 54 UG/DL — LOW (ref 110–370)
UROBILINOGEN FLD QL: ABNORMAL
UUN UR-MCNC: 471 MG/DL — SIGNIFICANT CHANGE UP
UUN UR-MCNC: 475 MG/DL — SIGNIFICANT CHANGE UP
VIT B12 SERPL-MCNC: 1507 PG/ML — HIGH (ref 232–1245)
WBC # BLD: 4.09 K/UL — LOW (ref 4.8–10.8)
WBC # BLD: 4.95 K/UL — SIGNIFICANT CHANGE UP (ref 4.8–10.8)
WBC # FLD AUTO: 4.09 K/UL — LOW (ref 4.8–10.8)
WBC # FLD AUTO: 4.95 K/UL — SIGNIFICANT CHANGE UP (ref 4.8–10.8)
WBC UR QL: 8 /HPF — HIGH (ref 0–5)

## 2021-08-06 PROCEDURE — 99233 SBSQ HOSP IP/OBS HIGH 50: CPT

## 2021-08-06 PROCEDURE — 76775 US EXAM ABDO BACK WALL LIM: CPT | Mod: 26

## 2021-08-06 PROCEDURE — 99221 1ST HOSP IP/OBS SF/LOW 40: CPT

## 2021-08-06 RX ORDER — POTASSIUM CHLORIDE 20 MEQ
20 PACKET (EA) ORAL
Refills: 0 | Status: COMPLETED | OUTPATIENT
Start: 2021-08-06 | End: 2021-08-07

## 2021-08-06 RX ORDER — METOPROLOL TARTRATE 50 MG
1 TABLET ORAL
Qty: 0 | Refills: 0 | DISCHARGE

## 2021-08-06 RX ORDER — SODIUM CHLORIDE 9 MG/ML
1000 INJECTION, SOLUTION INTRAVENOUS
Refills: 0 | Status: DISCONTINUED | OUTPATIENT
Start: 2021-08-06 | End: 2021-08-07

## 2021-08-06 RX ORDER — METRONIDAZOLE 500 MG
500 TABLET ORAL EVERY 8 HOURS
Refills: 0 | Status: DISCONTINUED | OUTPATIENT
Start: 2021-08-06 | End: 2021-08-10

## 2021-08-06 RX ORDER — COLLAGENASE CLOSTRIDIUM HIST. 250 UNIT/G
1 OINTMENT (GRAM) TOPICAL
Refills: 0 | Status: DISCONTINUED | OUTPATIENT
Start: 2021-08-06 | End: 2021-08-10

## 2021-08-06 RX ORDER — TRAMADOL HYDROCHLORIDE 50 MG/1
1 TABLET ORAL
Qty: 0 | Refills: 0 | DISCHARGE

## 2021-08-06 RX ORDER — CEFEPIME 1 G/1
2000 INJECTION, POWDER, FOR SOLUTION INTRAMUSCULAR; INTRAVENOUS DAILY
Refills: 0 | Status: DISCONTINUED | OUTPATIENT
Start: 2021-08-06 | End: 2021-08-10

## 2021-08-06 RX ADMIN — Medication 100 MILLIGRAM(S): at 21:34

## 2021-08-06 RX ADMIN — Medication 20 MILLIEQUIVALENT(S): at 22:51

## 2021-08-06 RX ADMIN — HEPARIN SODIUM 5000 UNIT(S): 5000 INJECTION INTRAVENOUS; SUBCUTANEOUS at 05:35

## 2021-08-06 RX ADMIN — Medication 20 MILLIEQUIVALENT(S): at 21:34

## 2021-08-06 RX ADMIN — SODIUM CHLORIDE 75 MILLILITER(S): 9 INJECTION, SOLUTION INTRAVENOUS at 21:35

## 2021-08-06 RX ADMIN — Medication 25 MILLIGRAM(S): at 05:35

## 2021-08-06 RX ADMIN — Medication 1 APPLICATION(S): at 05:35

## 2021-08-06 RX ADMIN — CEFEPIME 100 MILLIGRAM(S): 1 INJECTION, POWDER, FOR SOLUTION INTRAMUSCULAR; INTRAVENOUS at 12:07

## 2021-08-06 RX ADMIN — Medication 1 APPLICATION(S): at 17:16

## 2021-08-06 RX ADMIN — Medication 100 MILLIGRAM(S): at 12:06

## 2021-08-06 RX ADMIN — CHLORHEXIDINE GLUCONATE 1 APPLICATION(S): 213 SOLUTION TOPICAL at 05:35

## 2021-08-06 RX ADMIN — HEPARIN SODIUM 5000 UNIT(S): 5000 INJECTION INTRAVENOUS; SUBCUTANEOUS at 21:34

## 2021-08-06 NOTE — CONSULT NOTE ADULT - ASSESSMENT
#RLE nonhealing wounds 2/2 chronic venous stasis  - Plan for wash out of wound in OR today (08/06); Ensure patient is kept NPO   - Continue IV Cefepime and IV Flagyl (renally dosed)  - Trend ESR/CRP  - F/u Vascular consult  -Pending Blood Cx    #MARLENE  #Hyperchloremia  - Cr 3.0 on admission, baseline 0.9 -1.0; Most recent Cr: 2.7 (downtrending).  -Noted Chloride today of 115; Discontinue IV NS and begin LR @75cc/hr.   - Pending UA and urine lytes  -Renal U/S negative for obstructive uropathy  -Strict I/O's  -Serial BMPs  -Renally dose ABX for GFR <30.   -Avoid use of Nephrotoxic medications    #Macrocytic anemia  - Hb drop from 13 to 10.1 on admission; Most recent Hb on 08/06: 8.6  - No signs of active bleeding; Refused JAYASHREE  -Continue to monitor H&H  - Pending B12 and folate levels    #HTN  - Continue to hold Aldactone and Lasix due to MARLENE  -Continue Toprol XL  -Monitor BP

## 2021-08-06 NOTE — PHYSICAL THERAPY INITIAL EVALUATION ADULT - PERTINENT HX OF CURRENT PROBLEM, REHAB EVAL
The patient is a 65 year old male with PMHx of HTN and left-sided AKA up to the hip, presenting from [facility] with a chief complaint of RLE leg wound. The patient was admitted to medicine and discharged 7/25 after being found to have overlying cellulitis of venous stasis ulcer resulting in sepsis. After discharge he completed his oral antibiotics, but was sent in by Dr Sunshine (vascular) with instructions to consult burn (Dr Hutchison) and admission.

## 2021-08-06 NOTE — PROGRESS NOTE ADULT - ASSESSMENT
65 year old gentleman with a PMHx of HTN and left-sided AKA, presenting from [facility] with a chief complaint of RLE leg wound.   He was recently admitted for RLE cellulitis and venous stasis ulcers, treated with cefepime and vanco, completed Amoxicillin and Doxy as an outpatient. He was seen today by vascular surgery and referred to the ED to be further evaluated by burn for possible debridement.     # Non-healing LE venous stasis ulcers with superinfection. No sepsis.  - ABx as per ID - Cefepime and Flagyl   - 6/23 WCX pseudomonas, MSSA  - burn eval appreciated, scheduled for OR tomorrow for washout, please send Cx  - NPO after MN  - wound crae by burn    # Chronic macrocytic anemia - stable Hb, f/u Folate, vit b12    # MARLENE on CKD3A  - no hydro on US  - gentle IV hydration  - monitor Cr daily  - f/u urine studies, cr/prot, urine lytes  - nephro on board  - meds renally dosed    # PAD - s/p Left BKA  - US duplex in 06/2021 of RLE normal flow    # Metabolic acidosis due to MARLENE - monitor cr and bicarb    # Chronic hyponatremia - Na better after hydration, possible dehydration.    # Hypokalemia - supplement    # Obesity BMI (kg/m2): 34.5    DVt ppx     #Progress Note Handoff  Pending OR tomorrow

## 2021-08-06 NOTE — PROGRESS NOTE ADULT - ASSESSMENT
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 presenting from [facility] with a chief complaint of RLE leg wound sent in by Dr. Sunshine, his vascular surgeon.     #RLE nonhealing wounds 2/2 chronic venous stasis  - no sepsis on admission, sent by Dr. Sunshine for further eval  - ESR/procalc elevated, f/u CRP  - H/o of pseudomonas and staph aureus in RLE wound --> recent d/c for RLE cellulitis, was on vanc and cefepime and d/c on augmentin and doxycycline  - Burn evaluated patient in ED- was on schedule for washout today, OR tomorrow 8/7. f/u OR cultures  - as per ID, start on Cefepime 2g q24h IV + flagyl 500mg q8h IV. Likely D/C on PO levaquin (renally dosed)/flagyl to complete 10 day course if no new culture data  - wound care as per burn  - f/u vascular consult  - f/u BCx    #MARLENE (3.0 on admission, baseline 0.9-1.0) on CKD3A, metabolic acidosis (decreased bicarb) likely   (ATN? vs glomerulonephritis)  #hyponatremia (chronic)  - dc'ed NS, started LR @75cc/hr as per nephro recs  - renal ultrasound normal  - trend Cr: 3.0 --> 2.7  - U/A: microproteinuria, large blood  - protein/cr ratio 0.7  - FENa <2, intrinsic causes  - nephro eval: change NS to LR, renal dose all medications, avoid nephrotoxic meds    #Transaminitis- improving    #chronic Macrocytic anemia, stable  - Hb drop from 13 to 10 on admission  - no signs of active bleeding  - Refused JAYASHREE, keep active t/s  - B12/folate normal  - iron studies    # h/o PAD s/p Left AKA  - duplex 6/2021 showing normal RLE blood flow    # h/o HTN  - on toprol, aldactone and lasix at home  - held aldactone and lasix due to MARLENE, c/w toprol    DVT ppx: heparin subq q8  GI ppx: not indicated  Diet: DASH  Dispo: pending OR tomorrow for washout   The patient is a 65 year old male with PMHx of HTN and left-sided AKA up to the hip, CKD 3a, recently admitted for RLE cellulitis and venous stasis ulcers, treated with cefepime and vanco, completed Amoxicillin and Doxy as an outpatient. presenting from [facility] with a chief complaint of RLE leg wound sent in by Dr. Sunshine, his vascular surgeon.     #RLE nonhealing wounds 2/2 chronic venous stasis  - no sepsis on admission, sent by Dr. Sunshine for further eval  - ESR/procalc elevated, f/u CRP  - H/o of pseudomonas and staph aureus in RLE wound --> recent d/c for RLE cellulitis, was on vanc and cefepime and d/c on augmentin and doxycycline  - Burn evaluated patient in ED- was on schedule for washout today, OR tomorrow 8/7. f/u OR cultures  - as per ID, start on Cefepime 2g q24h IV + flagyl 500mg q8h IV. Likely D/C on PO levaquin (renally dosed)/flagyl to complete 10 day course if no new culture data  - wound care as per burn  - f/u vascular consult  - f/u BCx    #MARLENE (3.0 on admission, baseline 0.9-1.0) on CKD3A, metabolic acidosis (decreased bicarb) likely prerenal  #hyponatremia (chronic)  - dc'ed NS, started LR @75cc/hr as per nephro recs  - renal ultrasound normal  - trend Cr: 3.0 --> 2.7  - U/A: microproteinuria, large blood  - protein/cr ratio 0.7  - nephro eval: change NS to LR, renal dose all medications, avoid nephrotoxic meds    #Transaminitis- improving    #chronic Macrocytic anemia, stable  - Hb drop from 13 to 10 on admission  - no signs of active bleeding  - Refused JAYASHREE, keep active t/s  - B12/folate normal  - iron studies    # h/o PAD s/p Left AKA  - duplex 6/2021 showing normal RLE blood flow    # h/o HTN  - on toprol, aldactone and lasix at home  - held aldactone and lasix due to MARLENE, c/w toprol    DVT ppx: heparin subq q8  GI ppx: not indicated  Diet: DASH  Dispo: pending OR tomorrow for washout   The patient is a 65 year old male with PMHx of HTN and left-sided AKA up to the hip, CKD 3a, recently admitted for RLE cellulitis and venous stasis ulcers, treated with cefepime and vanco, completed Amoxicillin and Doxy as an outpatient. presenting from [facility] with a chief complaint of RLE leg wound sent in by Dr. Sunshine, his vascular surgeon.     #RLE nonhealing wounds 2/2 chronic venous stasis  - no sepsis on admission, sent by Dr. Sunshine for further eval  - ESR/procalc elevated, f/u CRP  - H/o of pseudomonas and staph aureus in RLE wound --> recent d/c for RLE cellulitis, was on vanc and cefepime and d/c on augmentin and doxycycline  - Burn evaluated patient in ED- was on schedule for washout today, OR tomorrow 8/7. f/u OR cultures  - as per ID, start on Cefepime 2g q24h IV + flagyl 500mg q8h IV. Likely D/C on PO levaquin (renally dosed)/flagyl to complete 10 day course if no new culture data  - wound care as per burn  - f/u vascular consult  - f/u BCx    #MARLENE (3.0 on admission, baseline 0.9-1.0) on CKD3A, likely prerenal/wound infection  #hyponatremia (chronic)  - dc'ed NS, started LR @75cc/hr as per nephro recs  - renal ultrasound normal  - trend Cr: 3.0 --> 2.7  - U/A: microproteinuria, large blood  - protein/cr ratio 0.7  - nephro eval: change NS to LR, renal dose all medications, avoid nephrotoxic meds    #Transaminitis- improving    #chronic Macrocytic anemia, stable  - Hb drop from 13 to 10 on admission  - no signs of active bleeding  - Refused JAYASHREE, keep active t/s  - B12/folate normal  - iron studies    # h/o PAD s/p Left AKA  - duplex 6/2021 showing normal RLE blood flow    # h/o HTN  - on toprol, aldactone and lasix at home  - held aldactone and lasix due to MARLENE, c/w toprol    DVT ppx: heparin subq q8  GI ppx: not indicated  Diet: DASH  Dispo: pending OR tomorrow for washout

## 2021-08-06 NOTE — PROGRESS NOTE ADULT - SUBJECTIVE AND OBJECTIVE BOX
JUDY PEÑALOZA 65y Male  MRN#: [id number]   Hospital Day: 1d    SUBJECTIVE  Patient is a 65y old Male who presents with a chief complaint of RLE wound (06 Aug 2021 17:33)  Currently admitted to medicine with the primary diagnosis of Nonhealing ulcer of right lower extremity    INTERVAL HPI AND OVERNIGHT EVENTS:  Patient was examined and seen at bedside. This morning he is resting comfortably in bed and reports no issues or overnight events. Reports right leg pain. Patient is primarily Arabic speaking.       OBJECTIVE  PAST MEDICAL & SURGICAL HISTORY  Hypertension    GERD (gastroesophageal reflux disease)    Edema    Circulation disorder of lower extremity  left lower extremity    S/P BKA (below knee amputation), left      ALLERGIES:  No Known Allergies    MEDICATIONS:  STANDING MEDICATIONS  cefepime   IVPB 2000 milliGRAM(s) IV Intermittent daily  chlorhexidine 4% Liquid 1 Application(s) Topical <User Schedule>  collagenase Ointment 1 Application(s) Topical two times a day  collagenase Ointment 1 Application(s) Topical two times a day  Dakins Solution - Full Strength 1 Application(s) Topical two times a day  heparin   Injectable 5000 Unit(s) SubCutaneous every 8 hours  metoprolol succinate ER 25 milliGRAM(s) Oral daily  metroNIDAZOLE  IVPB 500 milliGRAM(s) IV Intermittent every 8 hours  sodium chloride 0.9%. 1000 milliLiter(s) IV Continuous <Continuous>    PRN MEDICATIONS  acetaminophen   Tablet .. 650 milliGRAM(s) Oral every 6 hours PRN      VITAL SIGNS: Last 24 Hours  T(C): 36.2 (06 Aug 2021 14:06), Max: 36.6 (06 Aug 2021 05:30)  T(F): 97.2 (06 Aug 2021 14:06), Max: 97.8 (06 Aug 2021 05:30)  HR: 88 (06 Aug 2021 14:06) (88 - 97)  BP: 125/58 (06 Aug 2021 14:06) (125/58 - 144/60)  BP(mean): --  RR: 16 (06 Aug 2021 14:06) (16 - 20)  SpO2: 99% (06 Aug 2021 08:05) (99% - 100%)    LABS:                        8.6    4.95  )-----------( 90       ( 06 Aug 2021 07:48 )             26.5     08-06    140  |  115<H>  |  40<H>  ----------------------------<  78  3.4<L>   |  16<L>  |  2.7<H>    Ca    7.4<L>      06 Aug 2021 07:48    TPro  6.9  /  Alb  1.8<L>  /  TBili  1.2  /  DBili  x   /  AST  53<H>  /  ALT  27  /  AlkPhos  166<H>      PT/INR - ( 05 Aug 2021 10:45 )   PT: 16.00 sec;   INR: 1.40 ratio         PTT - ( 05 Aug 2021 10:45 )  PTT:31.5 sec  Urinalysis Basic - ( 06 Aug 2021 15:52 )    Color: Yellow / Appearance: Clear / S.013 / pH: x  Gluc: x / Ketone: Negative  / Bili: Negative / Urobili: 3 mg/dL   Blood: x / Protein: 30 mg/dL / Nitrite: Negative   Leuk Esterase: Negative / RBC: 431 /HPF / WBC 8 /HPF   Sq Epi: x / Non Sq Epi: 1 /HPF / Bacteria: Negative        Sedimentation Rate, Erythrocyte: 75 mm/Hr *H* (21 @ 07:48)          RADIOLOGY:  < from: US Renal (21 @ 08:26) >  IMPRESSION:    Normal renal ultrasound.      < end of copied text >    PHYSICAL EXAM:  CONSTITUTIONAL: No acute distress, well-developed, well-groomed, AAOx3  PULMONARY: Clear to auscultation bilaterally; no wheezes, rales, or rhonchi  CARDIOVASCULAR: Regular rate and rhythm; no murmurs, rubs, or gallops  GASTROINTESTINAL: Soft, non-tender, non-distended; bowel sounds present  MUSCULOSKELETAL: s/p L AKA, RLE venous statis with ulceration, bandaged, warm, tender to touch

## 2021-08-06 NOTE — PHYSICAL THERAPY INITIAL EVALUATION ADULT - ADDITIONAL COMMENTS
Pt lives in adult home with denny stevenson no CLAYTON. Pt claims he was ambulating short distances with rollator and independent with home ADLs. Denies use of home aide but receives assistance with grocery shopping.

## 2021-08-06 NOTE — CONSULT NOTE ADULT - ATTENDING COMMENTS
I was Physically Present for the key portions of the evaluation   I agree with the above History  , Physical examination Assessment and plan   I have Reviewed , Modified or appended where appropriate.  Please check A and P as above     # MARLENE on CKD 3A prerenal most likely/ wound infection   Creatinine Trend: 2.7<--, 3.0<--  switch IVF to LR at 75 cc per hour  check UA and urine lytes  sono no hydro   check BMP in AM   for debridement of wound in AM   dose antibx to GFR < 30   no need for RRT     will follow
Pt with multiple chronic open wounds of RLE /  Hx reviewed with pt. States he was referred to hospital because of
known to me with non healing ulcer    need abx and debridement  burn consult  no acute surgical intervention

## 2021-08-06 NOTE — CONSULT NOTE ADULT - SUBJECTIVE AND OBJECTIVE BOX
VASCULAR SURGERY CONSULT NOTE    HPI:  The patient is a 65 year old male with PMHx of HTN and left-sided AKA up to the hip, presenting from [facility] with a chief complaint of RLE leg wound. The patient was admitted to medicine and discharged  after being found to have overlying cellulitis of venous stasis ulcer resulting in sepsis. After discharge he completed his oral antibiotics, but was sent in by Dr Sunshine (vascular) with instructions to consult burn (Dr Hutchison) and admission. Currently he has no other complaints; the wound is only painful with wound dressing changes; no recent fevers, chills, CP/SOB, AP/N/V/D, urinary symptoms, or any other complaints at this time    In the ED, he was seen by the burn team and they recommended local wound care and no surgical intervention. Labs are remarkable for Hb of 10.1 (from 13), , Na 134, BUN 38, and Cr 3.0    Vascular surgery was consulted for right lower extremity venous stasis ulcer. Pt states having some discomfort in the area and denies pain. Pt was sent to the ED with Dr. Sunshine for vascular and burn interventions. Vitals are stable, hgb is 8.6. Denies F/C, N/V, HA, CP, SOB, palpitations, abd pain.       Vital Signs Last 24 Hrs  T(C): 36.4 (05 Aug 2021 09:59), Max: 36.4 (05 Aug 2021 09:59)  T(F): 97.5 (05 Aug 2021 09:59), Max: 97.5 (05 Aug 2021 09:59)  HR: 91 (05 Aug 2021 09:59) (91 - 91)  BP: 152/72 (05 Aug 2021 09:59) (152/72 - 152/72)  BP(mean): --  ABP: --  ABP(mean): --  RR: 20 (05 Aug 2021 09:59) (20 - 20)  SpO2: 100% (05 Aug 2021 09:59) (100% - 100%)   (05 Aug 2021 15:17)    PAST MEDICAL & SURGICAL HISTORY:  Hypertension  GERD (gastroesophageal reflux disease)  Edema  Circulation disorder of lower extremity left lower extremity  S/P BKA (below knee amputation), left    No Known Allergies    Home Medications:  acetaminophen 325 mg oral tablet: 2 tab(s) orally every 6 hours, As needed, Temp greater or equal to 38C (100.4F), Mild Pain (1 - 3) (06 Aug 2021 00:00)  Aldactone 25 mg oral tablet: one tablet orally daily (06 Aug 2021 00:00)  Lasix 20 mg oral tablet: three tablets daily (06 Aug 2021 00:00)  metoprolol succinate 25 mg oral tablet, extended release: 1 tab(s) orally once a day (06 Aug 2021 00:00)  Toprol-XL 25 mg oral tablet, extended release: 1 tab(s) orally once a day (06 Aug 2021 00:00)  traMADol 50 mg oral tablet: 1 tab(s) orally every 6 hours, As Needed (06 Aug 2021 00:00)    No permtinent family history of PVD    REVIEW OF SYSTEMS:  GENERAL:                                         see above  SKIN:                                                 see above  OPTHALMOLOGIC:                          negative  ENMT:                                               negative  RESPIRATORY AND THORAX:        negative  CARDIOVASCULAR:                         negative  GASTROINTESTINAL:                       negative  NEPHROLOGY:                                  negative  MUSCULOSKELETAL:                       see above  NEUROLOGIC:                                   negative  PSYCHIATRIC:                                    negative  HEMATOLOGY/LYMPHATICS:         negative  ENDOCRINE:                                     negative  ALLERGIC/IMMUNOLOGIC:            negative    12 point ROS otherwise normal except as stated in HPI    PHYSICAL EXAM  Vital Signs Last 24 Hrs  T(C): 36.2 (06 Aug 2021 14:06), Max: 36.6 (06 Aug 2021 05:30)  T(F): 97.2 (06 Aug 2021 14:06), Max: 97.8 (06 Aug 2021 05:30)  HR: 88 (06 Aug 2021 14:06) (88 - 97)  BP: 125/58 (06 Aug 2021 14:06) (125/58 - 144/60)  BP(mean): --  RR: 16 (06 Aug 2021 14:06) (16 - 20)  SpO2: 99% (06 Aug 2021 08:05) (99% - 100%)    Appearance: NAD	  HEENT: NC/AT, EOMI	  Neck: Supple  Cardiovascular: RRR, Normal S1 S2  Respiratory: Lungs clear to auscultation bilaterally   Gastrointestinal:  Soft, Non-tender, nondistended   Extremities: s/p left aka; RLE edema and ulceration  Vascular: right DP on doppler   Neurologic: Non-focal    MEDICATIONS:   MEDICATIONS  (STANDING):  cefepime   IVPB 2000 milliGRAM(s) IV Intermittent daily  chlorhexidine 4% Liquid 1 Application(s) Topical <User Schedule>  collagenase Ointment 1 Application(s) Topical two times a day  Dakins Solution - Full Strength 1 Application(s) Topical two times a day  heparin   Injectable 5000 Unit(s) SubCutaneous every 8 hours  metoprolol succinate ER 25 milliGRAM(s) Oral daily  metroNIDAZOLE  IVPB 500 milliGRAM(s) IV Intermittent every 8 hours  sodium chloride 0.9%. 1000 milliLiter(s) (70 mL/Hr) IV Continuous <Continuous>    MEDICATIONS  (PRN):  acetaminophen   Tablet .. 650 milliGRAM(s) Oral every 6 hours PRN Temp greater or equal to 38.5C (101.3F), Mild Pain (1 - 3)    LAB/STUDIES:                        8.6    4.95  )-----------( 90       ( 06 Aug 2021 07:48 )             26.5     08-    140  |  115<H>  |  40<H>  ----------------------------<  78  3.4<L>   |  16<L>  |  2.7<H>    Ca    7.4<L>      06 Aug 2021 07:48    TPro  6.9  /  Alb  1.8<L>  /  TBili  1.2  /  DBili  x   /  AST  53<H>  /  ALT  27  /  AlkPhos  166<H>  08-06    PT/INR - ( 05 Aug 2021 10:45 )   PT: 16.00 sec;   INR: 1.40 ratio      PTT - ( 05 Aug 2021 10:45 )  PTT:31.5 sec  LIVER FUNCTIONS - ( 06 Aug 2021 07:48 )  Alb: 1.8 g/dL / Pro: 6.9 g/dL / ALK PHOS: 166 U/L / ALT: 27 U/L / AST: 53 U/L / GGT: x           Urinalysis Basic - ( 06 Aug 2021 15:52 )    Color: Yellow / Appearance: Clear / S.013 / pH: x  Gluc: x / Ketone: Negative  / Bili: Negative / Urobili: 3 mg/dL   Blood: x / Protein: 30 mg/dL / Nitrite: Negative   Leuk Esterase: Negative / RBC: 431 /HPF / WBC 8 /HPF   Sq Epi: x / Non Sq Epi: 1 /HPF / Bacteria: Negative    IMAGING:  < from: US Renal (21 @ 08:26) >  IMPRESSION:  Normal renal ultrasound.    < from: Xray Chest 1 View- PORTABLE-Routine (Xray Chest 1 View- PORTABLE-Routine .) (21 @ 11:42) >  Impression:  No radiographic evidence of acute cardiopulmonary disease.  Low lung volumes leads to magnification of the pulmonary interstitium  --- End of Report ---  < end of copied text >

## 2021-08-06 NOTE — CONSULT NOTE ADULT - SUBJECTIVE AND OBJECTIVE BOX
NEPHROLOGY CONSULTATION NOTE    Patient is Mohawk Speaking:  ID #[id number] used.     Mr. Jesse Edmond is a 65 year old man with PMHx of HTN and LT-AKA whom presented from [facility] to the hospital with RLE extremity wound. He was admitted to [facility] for a similar presentation on 06/22/21, and was diagnosed with sepsis 2/2 chronic venous stasis ulcer to RLE.  He was given IV ABX, IV fluids and was subsequently discharged on 06/25/21 with oral ABX regimen, which he completed. He was counseled to return to the hospital by Dr. Sunshine (vascular) to consult Dr. Hutchison (burn team) for re-evaluation of RLE wound.     On admission, he was seen by the burn team, who recommended local wound care and was started on IV ABX/ IV fluids. Labs on admission were significant for BUN-38, Creatinine- 3.0 (Baseline ~1.0), Na-134 and Hgb-10.1. Most recent labs on 08/06 were notable for Hgb- 8.6, Cr- 2.7, BUN-40 and Chloride-115. Renal U/S was performed and was negative for obstruction.     Today, the patient still endorses moderate burning sensation surrounding the wound (9/10), which is exacerbated during dressing changes. He is scheduled for washout of RLE wound in OR today. He denies fever, chills, chest pain, cough, headaches, dysuria or hematuria.       PAST MEDICAL & SURGICAL HISTORY:  Hypertension    GERD (gastroesophageal reflux disease)    Edema    Circulation disorder of lower extremity  left lower extremity    S/P BKA (below knee amputation), left        Allergies:  No Known Allergies      Home Medications Reviewed    Hospital Medications:   MEDICATIONS  (STANDING):  cefepime   IVPB 2000 milliGRAM(s) IV Intermittent daily  chlorhexidine 4% Liquid 1 Application(s) Topical <User Schedule>  collagenase Ointment 1 Application(s) Topical two times a day  Dakins Solution - Full Strength 1 Application(s) Topical two times a day  heparin   Injectable 5000 Unit(s) SubCutaneous every 8 hours  metoprolol succinate ER 25 milliGRAM(s) Oral daily  metroNIDAZOLE  IVPB 500 milliGRAM(s) IV Intermittent every 8 hours  sodium chloride 0.9%. 1000 milliLiter(s) (70 mL/Hr) IV Continuous <Continuous>      SOCIAL HISTORY:  Denies ETOH use, tobacco products  FAMILY HISTORY:      REVIEW OF SYSTEMS:  CONSTITUTIONAL: No weakness, fevers or chills  EYES/ENT: No visual changes;  No vertigo or throat pain   NECK: No pain or stiffness  RESPIRATORY: No cough, wheezing, hemoptysis; No shortness of breath  CARDIOVASCULAR: No chest pain or palpitations.  GASTROINTESTINAL: No abdominal or epigastric pain. No nausea, vomiting, or hematemesis; No diarrhea or constipation. No melena or hematochezia.  GENITOURINARY: No dysuria, frequency, foamy urine, urinary urgency, incontinence or hematuria  NEUROLOGICAL: No numbness or weakness  SKIN: (+) ulceration present to [facility]  VASCULAR: No lower extremity edema.   All other review of systems is negative unless indicated above.    VITALS:  T(F): 97.2 (08-06-21 @ 14:06), Max: 98.4 (08-05-21 @ 15:45)  HR: 88 (08-06-21 @ 14:06)  BP: 125/58 (08-06-21 @ 14:06)  RR: 16 (08-06-21 @ 14:06)  SpO2: 99% (08-06-21 @ 08:05)    08-05 @ 07:01  -  08-06 @ 07:00  --------------------------------------------------------  IN: 630 mL / OUT: 1150 mL / NET: -520 mL    08-06 @ 07:01  -  08-06 @ 14:36  --------------------------------------------------------  IN: 570 mL / OUT: 600 mL / NET: -30 mL      Height (cm): 170.2 (08-06 @ 07:28)  Weight (kg): 100 (08-06 @ 07:28)  BMI (kg/m2): 34.5 (08-06 @ 07:28)  BSA (m2): 2.11 (08-06 @ 07:28)      I&O's Detail    05 Aug 2021 07:01  -  06 Aug 2021 07:00  --------------------------------------------------------  IN:    sodium chloride 0.9%: 630 mL  Total IN: 630 mL    OUT:    Voided (mL): 1150 mL  Total OUT: 1150 mL    Total NET: -520 mL      06 Aug 2021 07:01  -  06 Aug 2021 14:36  --------------------------------------------------------  IN:    IV PiggyBack: 150 mL    sodium chloride 0.9%: 420 mL  Total IN: 570 mL    OUT:    Voided (mL): 600 mL  Total OUT: 600 mL    Total NET: -30 mL      PHYSICAL EXAM:  Constitutional: NAD  HEENT: anicteric sclera, oropharynx clear, MMM  Neck: No JVD  Respiratory: CTAB, no wheezes, rales or rhonchi  Cardiovascular: S1, S2, RRR  Gastrointestinal: BS+, soft, NT/ND  Extremities: (+) LT AKA; RLE is wrapped with ace wrap; Mild warmth and erythema; No tenderness to palpation of RLE; No cyanosis or clubbing. No peripheral edema  Neurological: A/O x 3, no focal deficits  Psychiatric: Normal mood, normal affect  : No CVA tenderness. No suprapubic tenderness. No etienne.   Skin: No rashes    LABS:  08-06    140  |  115<H>  |  40<H>  ----------------------------<  78  3.4<L>   |  16<L>  |  2.7<H>    Ca    7.4<L>      06 Aug 2021 07:48    TPro  6.9  /  Alb  1.8<L>  /  TBili  1.2  /  DBili      /  AST  53<H>  /  ALT  27  /  AlkPhos  166<H>  08-06    Creatinine Trend: 2.7 <--, 3.0 <--                        8.6    4.95  )-----------( 90       ( 06 Aug 2021 07:48 )             26.5       RADIOLOGY & ADDITIONAL STUDIES:      EXAM:  US KIDNEY(S)            PROCEDURE DATE:  08/06/2021        INTERPRETATION:  CLINICAL INFORMATION: Worsening renal function.    COMPARISON: December 3, 2018    TECHNIQUE: Sonography of the kidneys and bladder.    FINDINGS:    Right kidney: 11.4 cm. No hydronephrosis or calculi.    Left kidney:  11.3 cm. No hydronephrosis or calculi.    Urinary bladder: Patient voided prior to the study.      IMPRESSION:    Normal renal ultrasound.        --- End of Report ---

## 2021-08-06 NOTE — CONSULT NOTE ADULT - ASSESSMENT
ASSESSMENT: Patient is a 64 yo M PMHx of HTN and left-sided AKA up to the hip presents with RLE wound  Vascular surgery consulted for venous statsis ulcer     PLAN:   -   -   - Patient seen/examined or Plan Discussed with Fellow, Dr. Gusman   - Plan to be discussed with Attending, Dr. Sunshine      VASCULAR TEAM SPECTRA: [id number] ASSESSMENT: Patient is a 66 yo M PMHx of HTN and left-sided AKA up to the hip presents with RLE wound  Vascular surgery consulted for venous statsis ulcer     PLAN:   -Dressing changes   -Wound management per Burn  -Pain management   -Plan Discussed with Fellow, Dr. Gusman   -Plan to be discussed with Attending, Dr. Sunshine      VASCULAR TEAM SPECTRA: [id number]

## 2021-08-06 NOTE — PROGRESS NOTE ADULT - SUBJECTIVE AND OBJECTIVE BOX
JUDY PEÑALOZA    Patient is a 65y old  Male who presents with a chief complaint of RLE wound (06 Aug 2021 14:34)    INTERVAL HPI/OVERNIGHT EVENTS: pt was planned for debridement this morning, but he ate. He does not have complaints.     CONSTITUTIONAL: No weakness, fevers or chills  RESPIRATORY: No cough, wheezing, hemoptysis; No shortness of breath  CARDIOVASCULAR: No chest pain or palpitations  GASTROINTESTINAL: No abdominal or epigastric pain. No nausea, vomiting, or hematemesis; No diarrhea or constipation. No melena or hematochezia.  GENITOURINARY: No dysuria, frequency or hematuria  NEUROLOGICAL: No numbness or weakness  MSK: right leg pain  SKIN: non-healing ulcers on Right leg    PHYSICAL EXAM:  T(C): 36.2, Max: 36.6 (21 @ 05:30)  HR: 88 (88 - 97)  BP: 125/58 (125/58 - 144/60)  RR: 16 (16 - 20)  SpO2: 99% (99% - 100%)    GENERAL: NAD  PULMONARY/CHEST: No rales, rhonchi, or wheezing  CARDIOVASC: Regular rate and rhythm; No murmurs  GI/ABDOMEN: Soft, Nontender, Nondistended; Bowel sounds present  EXTREMITIES: s/p left AKA, RLE with chronic venous stasis, +distal leg medial and lateral side wound.   NERVOUS SYSTEM:  Alert & Oriented X3, no gross neurological deficit     Consultant(s) Notes Reviewed by me.     LABS:                        8.6    4.95  )-----------( 90       ( 06 Aug 2021 07:48 )             26.5     08    140  |  115<H>  |  40<H>  ----------------------------<  78  3.4<L>   |  16<L>  |  2.7<H>    Ca    7.4<L>      06 Aug 2021 07:48    TPro  6.9  /  Alb  1.8<L>  /  TBili  1.2  /  DBili  x   /  AST  53<H>  /  ALT  27  /  AlkPhos  166<H>  08    PT/INR - ( 05 Aug 2021 10:45 )   PT: 16.00 sec;   INR: 1.40 ratio    PTT - ( 05 Aug 2021 10:45 )  PTT:31.5 sec    Sedimentation Rate, Erythrocyte: 75 mm/Hr (21 @ 07:48)     Urinalysis Basic - ( 06 Aug 2021 15:52 )    Color: Yellow / Appearance: Clear / S.013 / pH: x  Gluc: x / Ketone: Negative  / Bili: Negative / Urobili: 3 mg/dL   Blood: x / Protein: 30 mg/dL / Nitrite: Negative   Leuk Esterase: Negative / RBC: 431 /HPF / WBC 8 /HPF   Sq Epi: x / Non Sq Epi: 1 /HPF / Bacteria: Negative      RADIOLOGY & ADDITIONAL TESTS:  < from: US Renal (21 @ 08:26) >  FINDINGS:    Right kidney: 11.4 cm. No hydronephrosis or calculi.    Left kidney:  11.3 cm. No hydronephrosis or calculi.    Urinary bladder: Patient voided prior to the study.    IMPRESSION:    Normal renal ultrasound.    < end of copied text >    < from: VA Duplex Lower Extrem Arterial, Bilat (21 @ 10:19) >  IMPRESSION:  Right lower extremity has normal arterial blood flow. Limited study due to dressings.  Left above knee amputation.    < end of copied text >      MEDICATIONS  (STANDING):  cefepime   IVPB 2000 milliGRAM(s) IV Intermittent daily  chlorhexidine 4% Liquid 1 Application(s) Topical <User Schedule>  collagenase Ointment 1 Application(s) Topical two times a day  Dakins Solution - Full Strength 1 Application(s) Topical two times a day  heparin   Injectable 5000 Unit(s) SubCutaneous every 8 hours  metoprolol succinate ER 25 milliGRAM(s) Oral daily  metroNIDAZOLE  IVPB 500 milliGRAM(s) IV Intermittent every 8 hours  sodium chloride 0.9%. 1000 milliLiter(s) (70 mL/Hr) IV Continuous <Continuous>    MEDICATIONS  (PRN):  acetaminophen   Tablet .. 650 milliGRAM(s) Oral every 6 hours PRN Temp greater or equal to 38.5C (101.3F), Mild Pain (1 - 3)

## 2021-08-06 NOTE — CONSULT NOTE ADULT - ASSESSMENT
ASSESSMENT  65 year old male with PMHx of HTN and left-sided AKA up to the hip, presenting from [facility] with a chief complaint of RLE leg wound. The patient was admitted to medicine and discharged 7/25 after being found to have overlying cellulitis of venous stasis ulcer resulting in sepsis. After discharge he completed his oral antibiotics, but was sent in by Dr Sunshine (vascular) with instructions to consult burn (Dr Hutchison) and admission. C      IMPRESSION  #    Burn admission exam: Wounds: large full thickness wound to RLE, with the largest area if about 14 x5 cm to the lower medial leg with   serosanguinous drainage. There is a 6x4 cm would to lower lateral leg. 4x2 cm wound to dorsal foot. With areas of yellowish   thick exudate; Skin thickening and hyperpigmentation. No warmth, no pus drainage.      6/23 WCX pseudo (R aztreo , zosyn), MSSA  #Sepsis ruled out on admission   #Obesity BMI (kg/m2): 34.5  #MARLENE   Creatinine, Serum: 3.0 (08-05-21 @ 10:45)    Weight (kg): 100 (08-06-21 @ 07:28)    RECOMMENDATIONS  This is an incomplete consult note. All final recommendations to follow after interview and examination of the patient. Please follow recommendations noted below.  - Pending OR, f/u G?S & CX   - Cefepime 2g q24h IV + flagyl 500mg q8h IV    If any questions, please call or send a message on [employer] Teams  Please continue to update ID with any pertinent new laboratory or radiographic findings  Spectra [phone]   ASSESSMENT  65 year old male with PMHx of HTN and left-sided AKA up to the hip, presenting from [facility] with a chief complaint of RLE leg wound. The patient was admitted to medicine and discharged 7/25 after being found to have overlying cellulitis of venous stasis ulcer resulting in sepsis. After discharge he completed his oral antibiotics, but was sent in by Dr Sunshine (vascular) with instructions to consult burn (Dr Hutchison) and admission. C      IMPRESSION  #RLE wounds, stasis ulcers with suspected suprainfection    Burn admission exam: Wounds: large full thickness wound to RLE, with the largest area if about 14 x5 cm to the lower medial leg with   serosanguinous drainage. There is a 6x4 cm would to lower lateral leg. 4x2 cm wound to dorsal foot. With areas of yellowish   thick exudate; Skin thickening and hyperpigmentation. No warmth, no pus drainage.    6/23 WCX pseudo (R aztreo , zosyn), MSSA  #Sepsis ruled out on admission   #Obesity BMI (kg/m2): 34.5  #MARLENE   Creatinine, Serum: 3.0 (08-05-21 @ 10:45)    Weight (kg): 100 (08-06-21 @ 07:28)    RECOMMENDATIONS  - Pending OR, f/u G/S & CX   - Cefepime 2g q24h IV + flagyl 500mg q8h IV  - Likely D/C on PO levaquin (renally dosed)/flagyl to complete 10 day course if no new culture data    If any questions, please call or send a message on [employer] Teams  Please continue to update ID with any pertinent new laboratory or radiographic findings  Spectra [phone]

## 2021-08-06 NOTE — CONSULT NOTE ADULT - SUBJECTIVE AND OBJECTIVE BOX
JUDY PEÑALOZA  65y, Male  Allergy: No Known Allergies      CHIEF COMPLAINT:   RLE wound (05 Aug 2021 18:36)      LOS  1d    HPI  HPI:  The patient is a 65 year old male with PMHx of HTN and left-sided AKA up to the hip, presenting from [facility] with a chief complaint of RLE leg wound. The patient was admitted to medicine and discharged 7/25 after being found to have overlying cellulitis of venous stasis ulcer resulting in sepsis. After discharge he completed his oral antibiotics, but was sent in by Dr Sunshine (vascular) with instructions to consult burn (Dr Hutchison) and admission. Currently he has no other complaints; the wound is only painful with wound dressing changes; no recent fevers, chills, CP/SOB, AP/N/V/D, urinary symptoms, or any other complaints at this time    In the ED, he was seen by the burn team and they recommended local wound care and no surgical intervention. Labs are remarkable for Hb of 10.1 (from 13), , Na 134, BUN 38, and Cr 3.0    Vital Signs Last 24 Hrs  T(C): 36.4 (05 Aug 2021 09:59), Max: 36.4 (05 Aug 2021 09:59)  T(F): 97.5 (05 Aug 2021 09:59), Max: 97.5 (05 Aug 2021 09:59)  HR: 91 (05 Aug 2021 09:59) (91 - 91)  BP: 152/72 (05 Aug 2021 09:59) (152/72 - 152/72)  BP(mean): --  ABP: --  ABP(mean): --  RR: 20 (05 Aug 2021 09:59) (20 - 20)  SpO2: 100% (05 Aug 2021 09:59) (100% - 100%)   (05 Aug 2021 15:17)      INFECTIOUS DISEASE HISTORY:  ID consulted for RLE ulcer  recent augmentin/doxy  Afebrile  No leukocytosis     6/23 WCX pseudo (R aztreo , zosyn), MSSA    Burn admission exam: Wounds: large full thickness wound to RLE, with the largest area if about 14 x5 cm to the lower medial leg with   serosanguinous drainage. There is a 6x4 cm would to lower lateral leg. 4x2 cm wound to dorsal foot. With areas of yellowish   thick exudate; Skin thickening and hyperpigmentation. No warmth, no pus drainage.    PMH  PAST MEDICAL & SURGICAL HISTORY:  Hypertension    GERD (gastroesophageal reflux disease)    Edema    Circulation disorder of lower extremity  left lower extremity    S/P BKA (below knee amputation), left        FAMILY HISTORY      SOCIAL HISTORY  Social History:  nonsmoker, social etoh use (23 Jun 2021 00:37)        ROS  ***    VITALS:  T(F): 97.8, Max: 98.4 (08-05-21 @ 15:45)  HR: 97  BP: 127/61  RR: 16Vital Signs Last 24 Hrs  T(C): 36.6 (06 Aug 2021 07:28), Max: 36.9 (05 Aug 2021 15:45)  T(F): 97.8 (06 Aug 2021 07:28), Max: 98.4 (05 Aug 2021 15:45)  HR: 97 (06 Aug 2021 07:28) (91 - 97)  BP: 127/61 (06 Aug 2021 07:28) (127/61 - 152/72)  BP(mean): --  RR: 16 (06 Aug 2021 07:28) (16 - 20)  SpO2: 100% (05 Aug 2021 20:54) (100% - 100%)    PHYSICAL EXAM:  ***    TESTS & MEASUREMENTS:                        10.1   10.04 )-----------( 141      ( 05 Aug 2021 10:45 )             27.8     08-05    134<L>  |  108  |  38<H>  ----------------------------<  100<H>  3.9   |  15<L>  |  3.0<H>    Ca    7.9<L>      05 Aug 2021 10:45    TPro  7.5  /  Alb  1.9<L>  /  TBili  1.7<H>  /  DBili  x   /  AST  63<H>  /  ALT  30  /  AlkPhos  175<H>  08-05    eGFR if Non African American: 21 mL/min/1.73M2 (08-05-21 @ 10:45)  eGFR if : 24 mL/min/1.73M2 (08-05-21 @ 10:45)    LIVER FUNCTIONS - ( 05 Aug 2021 10:45 )  Alb: 1.9 g/dL / Pro: 7.5 g/dL / ALK PHOS: 175 U/L / ALT: 30 U/L / AST: 63 U/L / GGT: x               Culture - Other (collected 06-23-21 @ 12:20)  Source: .Other Right leg wound  Final Report (06-26-21 @ 20:50):    Rare Pseudomonas aeruginosa    Rare Staphylococcus aureus    Normal jeevan isolated  Organism: Staphylococcus aureus  Pseudomonas aeruginosa (06-26-21 @ 20:50)  Organism: Pseudomonas aeruginosa (06-26-21 @ 20:50)      -  Amikacin: S <=16      -  Aztreonam: R >16      -  Cefepime: S 4      -  Ceftazidime: I 16      -  Ciprofloxacin: S <=0.25      -  Gentamicin: S 4      -  Imipenem: S <=1      -  Levofloxacin: S <=0.5      -  Meropenem: S <=1      -  Piperacillin/Tazobactam: R >64      -  Tobramycin: S <=2      Method Type: DEEPALI  Organism: Staphylococcus aureus (06-26-21 @ 20:50)      -  Ampicillin/Sulbactam: S <=8/4      -  Cefazolin: S <=4      -  Clindamycin: S <=0.25      -  Erythromycin: S <=0.25      -  Gentamicin: S <=1 Should not be used as monotherapy      -  Oxacillin: S 0.5      -  Penicillin: R >8      -  RIF- Rifampin: S <=1 Should not be used as monotherapy      -  Tetra/Doxy: S <=1      -  Trimethoprim/Sulfamethoxazole: S <=0.5/9.5      -  Vancomycin: S 2      Method Type: DEEPALI    Culture - Blood (collected 06-22-21 @ 19:30)  Source: .Blood Blood-Peripheral  Final Report (06-28-21 @ 01:00):    No Growth Final    Culture - Blood (collected 06-22-21 @ 19:15)  Source: .Blood Blood-Peripheral  Final Report (06-28-21 @ 01:00):    No Growth Final            INFECTIOUS DISEASES TESTING  COVID-19 PCR: NotDetec (08-05-21 @ 11:30)  Procalcitonin, Serum: 8.43 ng/mL (06-23-21 @ 07:01)  COVID-19 PCR: NotDetec (06-22-21 @ 21:57)      INFLAMMATORY MARKERS      RADIOLOGY & ADDITIONAL TESTS:  I have personally reviewed the last Chest xray  CXR      CT      CARDIOLOGY TESTING  12 Lead ECG:   Ventricular Rate 90 BPM    Atrial Rate 90 BPM    P-R Interval 132 ms    QRS Duration 142 ms    Q-T Interval 474 ms    QTC Calculation(Bazett) 579 ms    P Axis 62 degrees    R Axis 51 degrees    T Axis 17 degrees    Diagnosis Line Normal sinus rhythm  Right bundle branch block  Abnormal ECG    Confirmed by Rodrigo Ferraro ([id number]) on 8/5/2021 6:05:20 PM (08-05-21 @ 11:20)      MEDICATIONS  chlorhexidine 4% Liquid 1 Topical <User Schedule>  collagenase Ointment 1 Topical two times a day  Dakins Solution - Full Strength 1 Topical two times a day  heparin   Injectable 5000 SubCutaneous every 8 hours  metoprolol succinate ER 25 Oral daily  sodium chloride 0.9%. 1000 IV Continuous <Continuous>        ANTIBIOTICS:      ALLERGIES:  No Known Allergies         JUDY PEÑALOZA  65y, Male  Allergy: No Known Allergies      CHIEF COMPLAINT:   RLE wound (05 Aug 2021 18:36)      LOS  1d    HPI  HPI:  The patient is a 65 year old male with PMHx of HTN and left-sided AKA up to the hip, presenting from [facility] with a chief complaint of RLE leg wound. The patient was admitted to medicine and discharged 7/25 after being found to have overlying cellulitis of venous stasis ulcer resulting in sepsis. After discharge he completed his oral antibiotics, but was sent in by Dr Sunshine (vascular) with instructions to consult burn (Dr Hutchison) and admission. Currently he has no other complaints; the wound is only painful with wound dressing changes; no recent fevers, chills, CP/SOB, AP/N/V/D, urinary symptoms, or any other complaints at this time    In the ED, he was seen by the burn team and they recommended local wound care and no surgical intervention. Labs are remarkable for Hb of 10.1 (from 13), , Na 134, BUN 38, and Cr 3.0    Vital Signs Last 24 Hrs  T(C): 36.4 (05 Aug 2021 09:59), Max: 36.4 (05 Aug 2021 09:59)  T(F): 97.5 (05 Aug 2021 09:59), Max: 97.5 (05 Aug 2021 09:59)  HR: 91 (05 Aug 2021 09:59) (91 - 91)  BP: 152/72 (05 Aug 2021 09:59) (152/72 - 152/72)  BP(mean): --  ABP: --  ABP(mean): --  RR: 20 (05 Aug 2021 09:59) (20 - 20)  SpO2: 100% (05 Aug 2021 09:59) (100% - 100%)   (05 Aug 2021 15:17)      INFECTIOUS DISEASE HISTORY:  ID consulted for RLE ulcer  recent augmentin/doxy  Afebrile  No leukocytosis     6/23 WCX pseudo (R aztreo , zosyn), MSSA    Burn admission exam: Wounds: large full thickness wound to RLE, with the largest area if about 14 x5 cm to the lower medial leg with   serosanguinous drainage. There is a 6x4 cm would to lower lateral leg. 4x2 cm wound to dorsal foot. With areas of yellowish   thick exudate; Skin thickening and hyperpigmentation. No warmth, no pus drainage.    PMH  PAST MEDICAL & SURGICAL HISTORY:  Hypertension    GERD (gastroesophageal reflux disease)    Edema    Circulation disorder of lower extremity  left lower extremity    S/P BKA (below knee amputation), left        FAMILY HISTORY  non-contributory     SOCIAL HISTORY  Social History:  nonsmoker, social etoh use (23 Jun 2021 00:37)        ROS  General: Denies rigors, nightsweats  HEENT: Denies headache, rhinorrhea, sore throat, eye pain  CV: Denies CP, palpitations  PULM: Denies wheezing, hemoptysis  GI: Denies hematemesis, hematochezia, melena  : Denies discharge, hematuria  MSK: Denies arthralgias, myalgias  SKIN: as noted above   NEURO: Denies paresthesias, weakness  PSYCH: Denies depression, anxiety     VITALS:  T(F): 97.8, Max: 98.4 (08-05-21 @ 15:45)  HR: 97  BP: 127/61  RR: 16Vital Signs Last 24 Hrs  T(C): 36.6 (06 Aug 2021 07:28), Max: 36.9 (05 Aug 2021 15:45)  T(F): 97.8 (06 Aug 2021 07:28), Max: 98.4 (05 Aug 2021 15:45)  HR: 97 (06 Aug 2021 07:28) (91 - 97)  BP: 127/61 (06 Aug 2021 07:28) (127/61 - 152/72)  BP(mean): --  RR: 16 (06 Aug 2021 07:28) (16 - 20)  SpO2: 100% (05 Aug 2021 20:54) (100% - 100%)    PHYSICAL EXAM:  Gen: NAD, resting in bed  HEENT: Normocephalic, atraumatic  Neck: supple, no lymphadenopathy  CV: Regular rate & regular rhythm  Lungs: decreased BS at bases, no fremitus  Abdomen: Soft, BS present  Ext: Warm, well perfused, L BKA RLE wounds, exudate, no purulence  Neuro: non focal, awake  Skin: no rash, no erythema    TESTS & MEASUREMENTS:                        10.1   10.04 )-----------( 141      ( 05 Aug 2021 10:45 )             27.8     08-05    134<L>  |  108  |  38<H>  ----------------------------<  100<H>  3.9   |  15<L>  |  3.0<H>    Ca    7.9<L>      05 Aug 2021 10:45    TPro  7.5  /  Alb  1.9<L>  /  TBili  1.7<H>  /  DBili  x   /  AST  63<H>  /  ALT  30  /  AlkPhos  175<H>  08-05    eGFR if Non African American: 21 mL/min/1.73M2 (08-05-21 @ 10:45)  eGFR if : 24 mL/min/1.73M2 (08-05-21 @ 10:45)    LIVER FUNCTIONS - ( 05 Aug 2021 10:45 )  Alb: 1.9 g/dL / Pro: 7.5 g/dL / ALK PHOS: 175 U/L / ALT: 30 U/L / AST: 63 U/L / GGT: x               Culture - Other (collected 06-23-21 @ 12:20)  Source: .Other Right leg wound  Final Report (06-26-21 @ 20:50):    Rare Pseudomonas aeruginosa    Rare Staphylococcus aureus    Normal jeevan isolated  Organism: Staphylococcus aureus  Pseudomonas aeruginosa (06-26-21 @ 20:50)  Organism: Pseudomonas aeruginosa (06-26-21 @ 20:50)      -  Amikacin: S <=16      -  Aztreonam: R >16      -  Cefepime: S 4      -  Ceftazidime: I 16      -  Ciprofloxacin: S <=0.25      -  Gentamicin: S 4      -  Imipenem: S <=1      -  Levofloxacin: S <=0.5      -  Meropenem: S <=1      -  Piperacillin/Tazobactam: R >64      -  Tobramycin: S <=2      Method Type: DEEPALI  Organism: Staphylococcus aureus (06-26-21 @ 20:50)      -  Ampicillin/Sulbactam: S <=8/4      -  Cefazolin: S <=4      -  Clindamycin: S <=0.25      -  Erythromycin: S <=0.25      -  Gentamicin: S <=1 Should not be used as monotherapy      -  Oxacillin: S 0.5      -  Penicillin: R >8      -  RIF- Rifampin: S <=1 Should not be used as monotherapy      -  Tetra/Doxy: S <=1      -  Trimethoprim/Sulfamethoxazole: S <=0.5/9.5      -  Vancomycin: S 2      Method Type: DEEPALI    Culture - Blood (collected 06-22-21 @ 19:30)  Source: .Blood Blood-Peripheral  Final Report (06-28-21 @ 01:00):    No Growth Final    Culture - Blood (collected 06-22-21 @ 19:15)  Source: .Blood Blood-Peripheral  Final Report (06-28-21 @ 01:00):    No Growth Final            INFECTIOUS DISEASES TESTING  COVID-19 PCR: NotDetec (08-05-21 @ 11:30)  Procalcitonin, Serum: 8.43 ng/mL (06-23-21 @ 07:01)  COVID-19 PCR: NotDetec (06-22-21 @ 21:57)      INFLAMMATORY MARKERS      RADIOLOGY & ADDITIONAL TESTS:  I have personally reviewed the last Chest xray  CXR      CT      CARDIOLOGY TESTING  12 Lead ECG:   Ventricular Rate 90 BPM    Atrial Rate 90 BPM    P-R Interval 132 ms    QRS Duration 142 ms    Q-T Interval 474 ms    QTC Calculation(Bazett) 579 ms    P Axis 62 degrees    R Axis 51 degrees    T Axis 17 degrees    Diagnosis Line Normal sinus rhythm  Right bundle branch block  Abnormal ECG    Confirmed by Rodrigo Ferraro ([id number]) on 8/5/2021 6:05:20 PM (08-05-21 @ 11:20)      MEDICATIONS  chlorhexidine 4% Liquid 1 Topical <User Schedule>  collagenase Ointment 1 Topical two times a day  Dakins Solution - Full Strength 1 Topical two times a day  heparin   Injectable 5000 SubCutaneous every 8 hours  metoprolol succinate ER 25 Oral daily  sodium chloride 0.9%. 1000 IV Continuous <Continuous>        ANTIBIOTICS:      ALLERGIES:  No Known Allergies

## 2021-08-07 LAB
A1C WITH ESTIMATED AVERAGE GLUCOSE RESULT: 4.4 % — SIGNIFICANT CHANGE UP (ref 4–5.6)
ALBUMIN SERPL ELPH-MCNC: 1.7 G/DL — LOW (ref 3.5–5.2)
ALP SERPL-CCNC: 156 U/L — HIGH (ref 30–115)
ALT FLD-CCNC: 27 U/L — SIGNIFICANT CHANGE UP (ref 0–41)
ANION GAP SERPL CALC-SCNC: 8 MMOL/L — SIGNIFICANT CHANGE UP (ref 7–14)
AST SERPL-CCNC: 57 U/L — HIGH (ref 0–41)
BASOPHILS # BLD AUTO: 0.03 K/UL — SIGNIFICANT CHANGE UP (ref 0–0.2)
BASOPHILS NFR BLD AUTO: 0.8 % — SIGNIFICANT CHANGE UP (ref 0–1)
BILIRUB SERPL-MCNC: 1.4 MG/DL — HIGH (ref 0.2–1.2)
BUN SERPL-MCNC: 36 MG/DL — HIGH (ref 10–20)
CALCIUM SERPL-MCNC: 7.5 MG/DL — LOW (ref 8.5–10.1)
CHLORIDE SERPL-SCNC: 118 MMOL/L — HIGH (ref 98–110)
CHOLEST SERPL-MCNC: 107 MG/DL — SIGNIFICANT CHANGE UP
CO2 SERPL-SCNC: 16 MMOL/L — LOW (ref 17–32)
CREAT SERPL-MCNC: 2.7 MG/DL — HIGH (ref 0.7–1.5)
EOSINOPHIL # BLD AUTO: 0.12 K/UL — SIGNIFICANT CHANGE UP (ref 0–0.7)
EOSINOPHIL NFR BLD AUTO: 3.1 % — SIGNIFICANT CHANGE UP (ref 0–8)
ESTIMATED AVERAGE GLUCOSE: 80 MG/DL — SIGNIFICANT CHANGE UP (ref 68–114)
FERRITIN SERPL-MCNC: 825 NG/ML — HIGH (ref 30–400)
GLUCOSE SERPL-MCNC: 74 MG/DL — SIGNIFICANT CHANGE UP (ref 70–99)
HCT VFR BLD CALC: 22.4 % — LOW (ref 42–52)
HDLC SERPL-MCNC: 40 MG/DL — LOW
HGB BLD-MCNC: 8.2 G/DL — LOW (ref 14–18)
IMM GRANULOCYTES NFR BLD AUTO: 0.3 % — SIGNIFICANT CHANGE UP (ref 0.1–0.3)
LIPID PNL WITH DIRECT LDL SERPL: 57 MG/DL — SIGNIFICANT CHANGE UP
LYMPHOCYTES # BLD AUTO: 0.83 K/UL — LOW (ref 1.2–3.4)
LYMPHOCYTES # BLD AUTO: 21.7 % — SIGNIFICANT CHANGE UP (ref 20.5–51.1)
MAGNESIUM SERPL-MCNC: 1.5 MG/DL — LOW (ref 1.8–2.4)
MCHC RBC-ENTMCNC: 36.6 G/DL — SIGNIFICANT CHANGE UP (ref 32–37)
MCHC RBC-ENTMCNC: 39.8 PG — HIGH (ref 27–31)
MCV RBC AUTO: 108.7 FL — HIGH (ref 80–94)
MONOCYTES # BLD AUTO: 0.47 K/UL — SIGNIFICANT CHANGE UP (ref 0.1–0.6)
MONOCYTES NFR BLD AUTO: 12.3 % — HIGH (ref 1.7–9.3)
NEUTROPHILS # BLD AUTO: 2.37 K/UL — SIGNIFICANT CHANGE UP (ref 1.4–6.5)
NEUTROPHILS NFR BLD AUTO: 61.8 % — SIGNIFICANT CHANGE UP (ref 42.2–75.2)
NON HDL CHOLESTEROL: 67 MG/DL — SIGNIFICANT CHANGE UP
NRBC # BLD: 0 /100 WBCS — SIGNIFICANT CHANGE UP (ref 0–0)
PLATELET # BLD AUTO: 82 K/UL — LOW (ref 130–400)
POTASSIUM SERPL-MCNC: 4.3 MMOL/L — SIGNIFICANT CHANGE UP (ref 3.5–5)
POTASSIUM SERPL-SCNC: 4.3 MMOL/L — SIGNIFICANT CHANGE UP (ref 3.5–5)
PROT SERPL-MCNC: 6.4 G/DL — SIGNIFICANT CHANGE UP (ref 6–8)
RBC # BLD: 2.06 M/UL — LOW (ref 4.7–6.1)
RBC # FLD: 14.1 % — SIGNIFICANT CHANGE UP (ref 11.5–14.5)
SODIUM SERPL-SCNC: 142 MMOL/L — SIGNIFICANT CHANGE UP (ref 135–146)
TRIGL SERPL-MCNC: 67 MG/DL — SIGNIFICANT CHANGE UP
WBC # BLD: 3.83 K/UL — LOW (ref 4.8–10.8)
WBC # FLD AUTO: 3.83 K/UL — LOW (ref 4.8–10.8)

## 2021-08-07 PROCEDURE — 99233 SBSQ HOSP IP/OBS HIGH 50: CPT

## 2021-08-07 RX ORDER — SODIUM CHLORIDE 9 MG/ML
1000 INJECTION, SOLUTION INTRAVENOUS
Refills: 0 | Status: DISCONTINUED | OUTPATIENT
Start: 2021-08-07 | End: 2021-08-08

## 2021-08-07 RX ORDER — MAGNESIUM OXIDE 400 MG ORAL TABLET 241.3 MG
400 TABLET ORAL
Refills: 0 | Status: DISCONTINUED | OUTPATIENT
Start: 2021-08-07 | End: 2021-08-09

## 2021-08-07 RX ORDER — MAGNESIUM OXIDE 400 MG ORAL TABLET 241.3 MG
400 TABLET ORAL EVERY 4 HOURS
Refills: 0 | Status: DISCONTINUED | OUTPATIENT
Start: 2021-08-07 | End: 2021-08-07

## 2021-08-07 RX ORDER — SODIUM BICARBONATE 1 MEQ/ML
650 SYRINGE (ML) INTRAVENOUS EVERY 8 HOURS
Refills: 0 | Status: DISCONTINUED | OUTPATIENT
Start: 2021-08-07 | End: 2021-08-08

## 2021-08-07 RX ORDER — MAGNESIUM SULFATE 500 MG/ML
2 VIAL (ML) INJECTION ONCE
Refills: 0 | Status: COMPLETED | OUTPATIENT
Start: 2021-08-07 | End: 2021-08-07

## 2021-08-07 RX ORDER — SODIUM BICARBONATE 1 MEQ/ML
0.11 SYRINGE (ML) INTRAVENOUS
Qty: 150 | Refills: 0 | Status: DISCONTINUED | OUTPATIENT
Start: 2021-08-07 | End: 2021-08-07

## 2021-08-07 RX ORDER — MAGNESIUM OXIDE 400 MG ORAL TABLET 241.3 MG
400 TABLET ORAL
Refills: 0 | Status: DISCONTINUED | OUTPATIENT
Start: 2021-08-07 | End: 2021-08-07

## 2021-08-07 RX ADMIN — SODIUM CHLORIDE 75 MILLILITER(S): 9 INJECTION, SOLUTION INTRAVENOUS at 22:18

## 2021-08-07 RX ADMIN — HEPARIN SODIUM 5000 UNIT(S): 5000 INJECTION INTRAVENOUS; SUBCUTANEOUS at 22:19

## 2021-08-07 RX ADMIN — Medication 75 MEQ/KG/HR: at 11:17

## 2021-08-07 RX ADMIN — Medication 20 MILLIEQUIVALENT(S): at 00:00

## 2021-08-07 RX ADMIN — Medication 1 APPLICATION(S): at 05:54

## 2021-08-07 RX ADMIN — Medication 100 MILLIGRAM(S): at 22:19

## 2021-08-07 RX ADMIN — SODIUM CHLORIDE 75 MILLILITER(S): 9 INJECTION, SOLUTION INTRAVENOUS at 13:24

## 2021-08-07 RX ADMIN — MAGNESIUM OXIDE 400 MG ORAL TABLET 400 MILLIGRAM(S): 241.3 TABLET ORAL at 17:20

## 2021-08-07 RX ADMIN — Medication 650 MILLIGRAM(S): at 22:19

## 2021-08-07 RX ADMIN — CEFEPIME 100 MILLIGRAM(S): 1 INJECTION, POWDER, FOR SOLUTION INTRAMUSCULAR; INTRAVENOUS at 11:17

## 2021-08-07 RX ADMIN — Medication 100 MILLIGRAM(S): at 13:28

## 2021-08-07 RX ADMIN — Medication 650 MILLIGRAM(S): at 13:28

## 2021-08-07 RX ADMIN — CHLORHEXIDINE GLUCONATE 1 APPLICATION(S): 213 SOLUTION TOPICAL at 05:55

## 2021-08-07 RX ADMIN — Medication 100 MILLIGRAM(S): at 05:54

## 2021-08-07 RX ADMIN — HEPARIN SODIUM 5000 UNIT(S): 5000 INJECTION INTRAVENOUS; SUBCUTANEOUS at 05:54

## 2021-08-07 RX ADMIN — Medication 50 GRAM(S): at 11:15

## 2021-08-07 RX ADMIN — Medication 25 MILLIGRAM(S): at 05:54

## 2021-08-07 RX ADMIN — Medication 1 APPLICATION(S): at 05:56

## 2021-08-07 RX ADMIN — HEPARIN SODIUM 5000 UNIT(S): 5000 INJECTION INTRAVENOUS; SUBCUTANEOUS at 13:28

## 2021-08-07 RX ADMIN — MAGNESIUM OXIDE 400 MG ORAL TABLET 400 MILLIGRAM(S): 241.3 TABLET ORAL at 14:13

## 2021-08-07 NOTE — PROGRESS NOTE ADULT - ASSESSMENT
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 presenting from [facility] with a chief complaint of RLE leg wound sent in by Dr. Sunshine, his vascular surgeon.     #RLE nonhealing wounds 2/2 chronic venous stasis  - no sepsis on admission, sent by Dr. Sunshine for further eval  - ESR/procalc/CRP elevated  - H/o of pseudomonas and staph aureus in RLE wound --> recent d/c for RLE cellulitis, was on vanc and cefepime and d/c on augmentin and doxycycline  - Burn evaluated patient in ED- was on schedule for washout 8/6 but cancelled, will be going to OR Monday 8/9. f/u OR cultures  - as per ID, start on Cefepime 2g q24h IV + flagyl 500mg q8h IV. Likely D/C on PO levaquin (renally dosed)/flagyl to complete 10 day course if no new culture data  - wound care as per burn  - f/u vascular consult  - f/u BCx    #MARLENE (3.0 on admission, baseline 0.9-1.0) on CKD3A, metabolic acidosis (decreased bicarb) likely   (ATN? vs glomerulonephritis)  #hyponatremia (chronic)  - dc'ed NS  - renal ultrasound normal  - trend Cr: 3.0 --> 2.7  - U/A: microproteinuria, large blood  - protein/cr ratio 0.7  - FENa >1%, intrinsic causes  - nephro eval: IV fluid , d5 with 3 amp of bicarbonate at 75 cc/h , if repeated bicarbonate < 15, start sodium bicarbonate 650 q 8, check ABG     #Transaminitis- improving    #chronic Macrocytic anemia, stable  - Hb drop from 13 to 10 on admission  - no signs of active bleeding  - Refused JAYASHREE, keep active t/s  - B12/folate normal  - iron studies    # h/o PAD s/p Left AKA  - duplex 6/2021 showing normal RLE blood flow    # h/o HTN  - on toprol, aldactone and lasix at home  - held aldactone and lasix due to MARLENE, c/w toprol    DVT ppx: heparin subq q8  GI ppx: not indicated  Diet: DASH  Dispo: pending OR Monday 8/9 for washout by burn, order preop labs at Sunday 4pm, NPO after midnight, f/u nephro recs, ABG   The patient is a 65 year old male with PMHx of HTN and left-sided AKA up to the hip, CKD 3a, recently admitted for RLE cellulitis and venous stasis ulcers, treated with cefepime and vanco, completed Amoxicillin and Doxy as an outpatient. presenting from [facility] with a chief complaint of RLE leg wound sent in by Dr. Sunshine, his vascular surgeon.     #RLE nonhealing wounds 2/2 chronic venous stasis  - no sepsis on admission, sent by Dr. Sunshine for further eval  - ESR/procalc/CRP elevated  - H/o of pseudomonas and staph aureus in RLE wound --> recent d/c for RLE cellulitis, was on vanc and cefepime and d/c on augmentin and doxycycline  - Burn evaluated patient in ED- was on schedule for washout 8/6 but cancelled, will be going to OR Monday 8/9. f/u OR cultures  - as per ID, start on Cefepime 2g q24h IV + flagyl 500mg q8h IV. Likely D/C on PO levaquin (renally dosed)/flagyl to complete 10 day course if no new culture data  - wound care as per burn  - f/u vascular consult  - f/u BCx    #MARLENE (3.0 on admission, baseline 0.9-1.0) on CKD3A, metabolic acidosis (decreased bicarb) likely   (ATN? vs glomerulonephritis)  #hyponatremia (chronic)  - c/w LR @75cc/hr  - renal ultrasound normal  - trend Cr: 3.0 --> 2.7  - U/A: microproteinuria, large blood  - protein/cr ratio 0.7  - FENa >1%, intrinsic causes  - start sodium bicarbonate 650 q 8 today 8/7 as per nephro recs  - nephro eval: change IV fluids to d5 with 3 amp of bicarbonate at 75 cc/h if repeated bicarbonate < 15, check ABG     #Transaminitis- improving    #chronic Macrocytic anemia, stable  - Hb drop from 13 to 10 on admission  - no signs of active bleeding  - Refused JAYASHREE, keep active t/s  - B12/folate normal  - iron studies    # h/o PAD s/p Left AKA  - duplex 6/2021 showing normal RLE blood flow    # h/o HTN  - on toprol, aldactone and lasix at home  - held aldactone and lasix due to MARLENE, c/w toprol    DVT ppx: heparin subq q8  GI ppx: not indicated  Diet: DASH  Dispo: pending OR Monday 8/9 for washout by burn, order preop labs at Sunday 4pm, NPO after midnight, f/u nephro recs, ABG   The patient is a 65 year old male with PMHx of HTN and left-sided AKA up to the hip, CKD 3a, recently admitted for RLE cellulitis and venous stasis ulcers, treated with cefepime and vanco, completed Amoxicillin and Doxy as an outpatient. presenting from [facility] with a chief complaint of RLE leg wound sent in by Dr. Sunshine, his vascular surgeon.     #RLE nonhealing wounds 2/2 chronic venous stasis  - no sepsis on admission, sent by Dr. Sunshine for further eval  - ESR/procalc/CRP elevated  - H/o of pseudomonas and staph aureus in RLE wound --> recent d/c for RLE cellulitis, was on vanc and cefepime and d/c on augmentin and doxycycline  - Burn evaluated patient in ED- was on schedule for washout 8/6 but cancelled, will be going to OR Monday 8/9. f/u OR cultures  - as per ID, start on Cefepime 2g q24h IV + flagyl 500mg q8h IV. Likely D/C on PO levaquin (renally dosed)/flagyl to complete 10 day course if no new culture data  - wound care as per burn  - f/u vascular consult  - f/u BCx    #MARLENE (3.0 on admission, baseline 0.9-1.0) on CKD3A, metabolic acidosis (decreased bicarb) likely prerenal  #hyponatremia (chronic)  - c/w LR @75cc/hr  - renal ultrasound normal  - trend Cr: 3.0 --> 2.7  - U/A: microproteinuria, large blood  - protein/cr ratio 0.7  - start sodium bicarbonate 650 q 8 today 8/7 as per nephro recs  - nephro eval: change IV fluids to d5 with 3 amp of bicarbonate at 75 cc/h if repeated bicarbonate < 15, check ABG     #Transaminitis- improving    #chronic Macrocytic anemia, stable  - Hb drop from 13 to 10 on admission  - no signs of active bleeding  - Refused JAYASHREE, keep active t/s  - B12/folate normal  - iron studies    # h/o PAD s/p Left AKA  - duplex 6/2021 showing normal RLE blood flow    # h/o HTN  - on toprol, aldactone and lasix at home  - held aldactone and lasix due to MARLENE, c/w toprol    DVT ppx: heparin subq q8  GI ppx: not indicated  Diet: DASH  Dispo: pending OR Monday 8/9 for washout by burn, order preop labs at Sunday 4pm, NPO after midnight, f/u nephro recs, ABG   The patient is a 65 year old male with PMHx of HTN and left-sided AKA up to the hip, CKD 3a, recently admitted for RLE cellulitis and venous stasis ulcers, treated with cefepime and vanco, completed Amoxicillin and Doxy as an outpatient. presenting from [facility] with a chief complaint of RLE leg wound sent in by Dr. Sunshine, his vascular surgeon.     #RLE nonhealing wounds 2/2 chronic venous stasis  - no sepsis on admission, sent by Dr. Sunshine for further eval  - ESR/procalc/CRP elevated  - H/o of pseudomonas and staph aureus in RLE wound --> recent d/c for RLE cellulitis, was on vanc and cefepime and d/c on augmentin and doxycycline  - Burn evaluated patient in ED- was on schedule for washout 8/6 but cancelled, will be going to OR Monday 8/9. f/u OR cultures  - as per ID, start on Cefepime 2g q24h IV + flagyl 500mg q8h IV. Likely D/C on PO levaquin (renally dosed)/flagyl to complete 10 day course if no new culture data  - wound care as per burn  - f/u vascular consult  - f/u BCx    #MARLENE (3.0 on admission, baseline 0.9-1.0) on CKD3A, likely prerenal/wound infection  #hyponatremia (chronic)  - c/w LR @75cc/hr  - renal ultrasound normal  - trend Cr: 3.0 --> 2.7  - U/A: microproteinuria, large blood  - protein/cr ratio 0.7  - start sodium bicarbonate 650 q 8 today 8/7 as per nephro recs  - nephro eval: change IV fluids to d5 with 3 amp of bicarbonate at 75 cc/h if repeated bicarbonate < 15, check ABG     #Transaminitis- improving    #chronic Macrocytic anemia, stable  - Hb drop from 13 to 10 on admission  - no signs of active bleeding  - Refused JAYASHREE, keep active t/s  - B12/folate normal  - iron studies    # h/o PAD s/p Left AKA  - duplex 6/2021 showing normal RLE blood flow    # h/o HTN  - on toprol, aldactone and lasix at home  - held aldactone and lasix due to MARLENE, c/w toprol    DVT ppx: heparin subq q8  GI ppx: not indicated  Diet: DASH  Dispo: pending OR Monday 8/9 for washout by burn, order preop labs at Sunday 4pm, NPO after midnight, f/u nephro recs, ABG   The patient is a 65 year old male with PMHx of HTN and left-sided AKA up to the hip, CKD 3a, recently admitted for RLE cellulitis and venous stasis ulcers, treated with cefepime and vanco, completed Amoxicillin and Doxy as an outpatient. presenting from [facility] with a chief complaint of RLE leg wound sent in by Dr. Sunshine, his vascular surgeon.     #RLE nonhealing wounds 2/2 chronic venous stasis  - no sepsis on admission, sent by Dr. Sunshine for further eval  - ESR/procalc/CRP elevated  - H/o of pseudomonas and staph aureus in RLE wound --> recent d/c for RLE cellulitis, was on vanc and cefepime and d/c on augmentin and doxycycline  - Burn evaluated patient in ED- was on schedule for washout 8/6 but cancelled, will be going to OR Monday 8/9. f/u OR cultures  - as per ID, start on Cefepime 2g q24h IV + flagyl 500mg q8h IV. Likely D/C on PO levaquin (renally dosed)/flagyl to complete 10 day course if no new culture data  - wound care as per burn  - f/u vascular consult  - f/u BCx    #MARLENE (3.0 on admission, baseline 0.9-1.0) on CKD3A, likely prerenal/wound infection, metabolic acidosis   #hyponatremia (chronic)  - c/w LR @75cc/hr  - renal ultrasound normal  - trend Cr: 3.0 --> 2.7  - U/A: microproteinuria, large blood  - protein/cr ratio 0.7  - start sodium bicarbonate 650 q 8 today 8/7 as per nephro recs  - nephro eval: change IV fluids to d5 with 3 amp of bicarbonate at 75 cc/h if repeated bicarbonate < 15, check ABG     #Transaminitis- improving    #chronic Macrocytic anemia, stable  - Hb drop from 13 to 10 on admission  - no signs of active bleeding  - Refused JAYASHREE, keep active t/s  - B12/folate normal  - iron studies    # h/o PAD s/p Left AKA  - duplex 6/2021 showing normal RLE blood flow    # h/o HTN  - on toprol, aldactone and lasix at home  - held aldactone and lasix due to MARLENE, c/w toprol    DVT ppx: heparin subq q8  GI ppx: not indicated  Diet: DASH  Dispo: pending OR Monday 8/9 for washout by burn, order preop labs at Sunday 4pm, NPO after midnight, f/u nephro recs, ABG   The patient is a 65 year old male with PMHx of HTN and left-sided AKA up to the hip, CKD 3a, recently admitted for RLE cellulitis and venous stasis ulcers, treated with cefepime and vanco, completed Amoxicillin and Doxy as an outpatient. presenting from [facility] with a chief complaint of RLE leg wound sent in by Dr. Sunshine, his vascular surgeon.     #RLE nonhealing wounds 2/2 chronic venous stasis  - no sepsis on admission, sent by Dr. Sunshine for further eval  - ESR/procalc/CRP elevated  - H/o of pseudomonas and staph aureus in RLE wound --> recent d/c for RLE cellulitis, was on vanc and cefepime and d/c on augmentin and doxycycline  - Burn evaluated patient in ED- was on schedule for washout 8/6 but cancelled, will be going to OR Monday 8/9. f/u OR cultures  - as per ID, start on Cefepime 2g q24h IV + flagyl 500mg q8h IV. Likely D/C on PO levaquin (renally dosed)/flagyl to complete 10 day course if no new culture data  - wound care as per burn  - f/u vascular consult  - f/u BCx    #MARLENE (3.0 on admission, baseline 0.9-1.0) on CKD3A, likely prerenal/wound infection  #metabolic acidosis   #hyponatremia (chronic)  - c/w LR @75cc/hr  - renal ultrasound normal  - trend Cr: 3.0 --> 2.7  - monitor bicarb  - U/A: microproteinuria  - protein/cr ratio 0.7  - start sodium bicarbonate 650 q 8 today 8/7 as per nephro recs  - nephro eval: change IV fluids to d5 with 3 amp of bicarbonate at 75 cc/h if repeated bicarbonate < 15, check ABG     #Transaminitis- improving    #chronic Macrocytic anemia, stable  - Hb drop from 13 to 10 on admission  - no signs of active bleeding  - Refused JAYASHREE, keep active t/s  - B12/folate normal  - iron studies    # h/o PAD s/p Left AKA  - duplex 6/2021 showing normal RLE blood flow    # h/o HTN  - on toprol, aldactone and lasix at home  - held aldactone and lasix due to MARLENE, c/w toprol    DVT ppx: heparin subq q8  GI ppx: not indicated  Diet: DASH  Dispo: pending OR Monday 8/9 for washout by burn, order preop labs at Sunday 4pm, NPO after midnight, f/u nephro recs, ABG

## 2021-08-07 NOTE — PROGRESS NOTE ADULT - SUBJECTIVE AND OBJECTIVE BOX
JUDY PEÑALOZA 65y Male  MRN#: [id number]   Hospital Day: 2d    SUBJECTIVE  Patient is a 65y old Male who presents with a chief complaint of RLE wound (07 Aug 2021 07:53)  Currently admitted to medicine with the primary diagnosis of Nonhealing ulcer of right lower extremity    INTERVAL HPI AND OVERNIGHT EVENTS:  Patient was examined and seen at bedside. This morning he is resting comfortably in bed and reports no issues or overnight events.    OBJECTIVE  PAST MEDICAL & SURGICAL HISTORY  Hypertension    GERD (gastroesophageal reflux disease)    Edema    Circulation disorder of lower extremity  left lower extremity    S/P BKA (below knee amputation), left      ALLERGIES:  No Known Allergies    MEDICATIONS:  STANDING MEDICATIONS  cefepime   IVPB 2000 milliGRAM(s) IV Intermittent daily  chlorhexidine 4% Liquid 1 Application(s) Topical <User Schedule>  collagenase Ointment 1 Application(s) Topical two times a day  collagenase Ointment 1 Application(s) Topical two times a day  Dakins Solution - Full Strength 1 Application(s) Topical two times a day  heparin   Injectable 5000 Unit(s) SubCutaneous every 8 hours  magnesium sulfate  IVPB 2 Gram(s) IV Intermittent once  metoprolol succinate ER 25 milliGRAM(s) Oral daily  metroNIDAZOLE  IVPB 500 milliGRAM(s) IV Intermittent every 8 hours  sodium bicarbonate  Infusion 0.113 mEq/kG/Hr IV Continuous <Continuous>    PRN MEDICATIONS  acetaminophen   Tablet .. 650 milliGRAM(s) Oral every 6 hours PRN      VITAL SIGNS: Last 24 Hours  T(C): 36.3 (07 Aug 2021 05:44), Max: 36.7 (06 Aug 2021 21:39)  T(F): 97.4 (07 Aug 2021 05:44), Max: 98 (06 Aug 2021 21:39)  HR: 83 (07 Aug 2021 05:44) (83 - 88)  BP: 126/57 (07 Aug 2021 05:44) (124/59 - 126/57)  BP(mean): --  RR: 16 (06 Aug 2021 21:39) (16 - 16)  SpO2: --    LABS:                        8.2    3.83  )-----------( 82       ( 07 Aug 2021 06:41 )             22.4     08-07    142  |  118<H>  |  36<H>  ----------------------------<  74  4.3   |  16<L>  |  2.7<H>    Ca    7.5<L>      07 Aug 2021 06:41  Mg     1.5         TPro  6.4  /  Alb  1.7<L>  /  TBili  1.4<H>  /  DBili  x   /  AST  57<H>  /  ALT  27  /  AlkPhos  156<H>      PT/INR - ( 06 Aug 2021 21:37 )   PT: 16.20 sec;   INR: 1.41 ratio           Urinalysis Basic - ( 06 Aug 2021 15:52 )    Color: Yellow / Appearance: Clear / S.013 / pH: x  Gluc: x / Ketone: Negative  / Bili: Negative / Urobili: 3 mg/dL   Blood: x / Protein: 30 mg/dL / Nitrite: Negative   Leuk Esterase: Negative / RBC: 431 /HPF / WBC 8 /HPF   Sq Epi: x / Non Sq Epi: 1 /HPF / Bacteria: Negative    RADIOLOGY:    PHYSICAL EXAM:  CONSTITUTIONAL: No acute distress, well-developed, well-groomed  PULMONARY: Clear to auscultation bilaterally; no wheezes, rales, or rhonchi  CARDIOVASCULAR: Regular rate and rhythm; no murmurs, rubs, or gallops  GASTROINTESTINAL: Soft, non-tender, non-distended; bowel sounds present  MUSCULOSKELETAL: s/p L AKA, RLE venous statis with ulceration, bandaged, warm, tender to touch  Neuro: AAOx3

## 2021-08-07 NOTE — PROGRESS NOTE ADULT - ASSESSMENT
# MARLENE on CKD 3A prerenal most likely/ wound infection   Creatinine stable  check repeat today   IV fluid , d5 with 3 amp of bicarbonate at 75 cc/h , if repeated bicarbonate < 15  start sodium bicarbonate 650 q 8   check ABG   sono no hydro   check ph level   dose antibx to GFR < 30   no need for RRT   will follow .

## 2021-08-07 NOTE — PROGRESS NOTE ADULT - SUBJECTIVE AND OBJECTIVE BOX
seen and examined  no distress         PAST HISTORY  --------------------------------------------------------------------------------  No significant changes to PMH, PSH, FHx, SHx, unless otherwise noted    ALLERGIES & MEDICATIONS  --------------------------------------------------------------------------------  Allergies    No Known Allergies    Intolerances      Standing Inpatient Medications  cefepime   IVPB 2000 milliGRAM(s) IV Intermittent daily  chlorhexidine 4% Liquid 1 Application(s) Topical <User Schedule>  collagenase Ointment 1 Application(s) Topical two times a day  collagenase Ointment 1 Application(s) Topical two times a day  Dakins Solution - Full Strength 1 Application(s) Topical two times a day  heparin   Injectable 5000 Unit(s) SubCutaneous every 8 hours  lactated ringers. 1000 milliLiter(s) IV Continuous <Continuous>  metoprolol succinate ER 25 milliGRAM(s) Oral daily  metroNIDAZOLE  IVPB 500 milliGRAM(s) IV Intermittent every 8 hours    PRN Inpatient Medications  acetaminophen   Tablet .. 650 milliGRAM(s) Oral every 6 hours PRN      VITALS/PHYSICAL EXAM  --------------------------------------------------------------------------------  T(C): 36.3 (08-07-21 @ 05:44), Max: 36.7 (08-06-21 @ 21:39)  HR: 83 (08-07-21 @ 05:44) (83 - 88)  BP: 126/57 (08-07-21 @ 05:44) (124/59 - 126/57)  RR: 16 (08-06-21 @ 21:39) (16 - 16)  SpO2: 99% (08-06-21 @ 08:05) (99% - 99%)  Wt(kg): --  Height (cm): 170.2 (08-06-21 @ 07:28)  Weight (kg): 100 (08-06-21 @ 07:28)  BMI (kg/m2): 34.5 (08-06-21 @ 07:28)  BSA (m2): 2.11 (08-06-21 @ 07:28)      08-06-21 @ 07:01  -  08-07-21 @ 07:00  --------------------------------------------------------  IN: 780 mL / OUT: 1320 mL / NET: -540 mL      Physical Exam:  	Gen: NAD,  	Pulm: CTA B/L  	CV:  S1S2; no rub  	Abd: +distended      LABS/STUDIES  --------------------------------------------------------------------------------              7.9    4.09  >-----------<  79       [08-06-21 @ 21:37]              21.8     141  |  118  |  38  ----------------------------<  94      [08-06-21 @ 21:37]  4.4   |  12  |  2.8        Ca     7.3     [08-06-21 @ 21:37]      Mg     1.6     [08-06-21 @ 21:37]    TPro  6.4  /  Alb  1.6  /  TBili  1.3  /  DBili  x   /  AST  73  /  ALT  28  /  AlkPhos  153  [08-06-21 @ 21:37]    PT/INR: PT 16.20, INR 1.41       [08-06-21 @ 21:37]  PTT: 31.5       [08-05-21 @ 10:45]      Creatinine Trend:  SCr 2.8 [08-06 @ 21:37]  SCr 2.7 [08-06 @ 07:48]  SCr 3.0 [08-05 @ 10:45]    Urinalysis - [08-06-21 @ 15:52]      Color Yellow / Appearance Clear / SG 1.013 / pH 6.5      Gluc Negative / Ketone Negative  / Bili Negative / Urobili 3 mg/dL       Blood Large / Protein 30 mg/dL / Leuk Est Negative / Nitrite Negative       / WBC 8 / Hyaline 1 / Gran  / Sq Epi  / Non Sq Epi 1 / Bacteria Negative    Urine Creatinine 67      [08-06-21 @ 15:52]  Urine Protein 49      [08-06-21 @ 15:52]  Urine Sodium 60.0      [08-06-21 @ 15:52]  Urine Urea Nitrogen 471      [08-06-21 @ 15:52]  Urine Potassium 34      [08-06-21 @ 15:52]  Urine Chloride 45      [08-05-21 @ 15:52]  Urine Osmolality 366      [08-05-21 @ 15:52]    Iron 64, TIBC 118, %sat 54      [08-06-21 @ 07:48]  TSH 0.48      [06-23-21 @ 07:01]  Lipid: chol 93, TG 67, HDL 57, LDL --      [06-23-21 @ 07:01]

## 2021-08-07 NOTE — PROGRESS NOTE ADULT - ATTENDING COMMENTS
no new complaints, pt feels fine.     65 year old gentleman with a PMHx of HTN and left-sided AKA, presenting from [facility] with a chief complaint of RLE leg wound.   He was recently admitted for RLE cellulitis and venous stasis ulcers, treated with cefepime and vanco, completed Amoxicillin and Doxy as an outpatient. He was seen today by vascular surgery and referred to the ED to be further evaluated by burn for possible debridement.     # Non-healing LE venous stasis ulcers with superinfection. No sepsis.  - ABx as per ID - Cefepime and Flagyl   - 6/23 WCX pseudomonas, MSSA  - burn eval appreciated, initially scheduled for OR today, but as per burn rescheduled on Monday   - NPO after MN on Sunday  - wound care by burn    # Chronic macrocytic anemia, most likely ACD due to DM - stable Hb, Folate 8.9, vit b12 1507, TSH 0.48    # MARLENE on CKD3A  - no hydro on US  - not improving with IVF  - monitor Cr daily  - f/u urine studies, cr/prot, urine lytes  - nephro on board  - meds renally dosed    # PAD - s/p Left BKA  - US duplex in 06/2021 of RLE normal flow    # Metabolic acidosis due to MARLENE - monitor cr and bicarb  - cont Bicarb 650 TID PO    # Chronic hyponatremia - Na better after hydration, possible dehydration.    # Hypokalemia - supplemented    # Obesity BMI (kg/m2): 34.5    DVt ppx     #Progress Note Handoff  Pending OR on Monday, monitor cr

## 2021-08-08 LAB
ACANTHOCYTES BLD QL SMEAR: SLIGHT — SIGNIFICANT CHANGE UP
ALBUMIN SERPL ELPH-MCNC: 1.8 G/DL — LOW (ref 3.5–5.2)
ALBUMIN SERPL ELPH-MCNC: 1.8 G/DL — LOW (ref 3.5–5.2)
ALP SERPL-CCNC: 173 U/L — HIGH (ref 30–115)
ALP SERPL-CCNC: 185 U/L — HIGH (ref 30–115)
ALT FLD-CCNC: 29 U/L — SIGNIFICANT CHANGE UP (ref 0–41)
ALT FLD-CCNC: 29 U/L — SIGNIFICANT CHANGE UP (ref 0–41)
ANION GAP SERPL CALC-SCNC: 7 MMOL/L — SIGNIFICANT CHANGE UP (ref 7–14)
ANION GAP SERPL CALC-SCNC: 7 MMOL/L — SIGNIFICANT CHANGE UP (ref 7–14)
APTT BLD: 35.5 SEC — SIGNIFICANT CHANGE UP (ref 27–39.2)
AST SERPL-CCNC: 66 U/L — HIGH (ref 0–41)
AST SERPL-CCNC: 66 U/L — HIGH (ref 0–41)
BASOPHILS # BLD AUTO: 0 K/UL — SIGNIFICANT CHANGE UP (ref 0–0.2)
BASOPHILS # BLD AUTO: 0.03 K/UL — SIGNIFICANT CHANGE UP (ref 0–0.2)
BASOPHILS NFR BLD AUTO: 0 % — SIGNIFICANT CHANGE UP (ref 0–1)
BASOPHILS NFR BLD AUTO: 0.7 % — SIGNIFICANT CHANGE UP (ref 0–1)
BILIRUB SERPL-MCNC: 1 MG/DL — SIGNIFICANT CHANGE UP (ref 0.2–1.2)
BILIRUB SERPL-MCNC: 1.1 MG/DL — SIGNIFICANT CHANGE UP (ref 0.2–1.2)
BLD GP AB SCN SERPL QL: SIGNIFICANT CHANGE UP
BLD GP AB SCN SERPL QL: SIGNIFICANT CHANGE UP
BUN SERPL-MCNC: 30 MG/DL — HIGH (ref 10–20)
BUN SERPL-MCNC: 32 MG/DL — HIGH (ref 10–20)
CALCIUM SERPL-MCNC: 7.3 MG/DL — LOW (ref 8.5–10.1)
CALCIUM SERPL-MCNC: 7.3 MG/DL — LOW (ref 8.5–10.1)
CHLORIDE SERPL-SCNC: 113 MMOL/L — HIGH (ref 98–110)
CHLORIDE SERPL-SCNC: 113 MMOL/L — HIGH (ref 98–110)
CO2 SERPL-SCNC: 14 MMOL/L — LOW (ref 17–32)
CO2 SERPL-SCNC: 19 MMOL/L — SIGNIFICANT CHANGE UP (ref 17–32)
CREAT SERPL-MCNC: 2.1 MG/DL — HIGH (ref 0.7–1.5)
CREAT SERPL-MCNC: 2.3 MG/DL — HIGH (ref 0.7–1.5)
EOSINOPHIL # BLD AUTO: 0.23 K/UL — SIGNIFICANT CHANGE UP (ref 0–0.7)
EOSINOPHIL # BLD AUTO: 0.23 K/UL — SIGNIFICANT CHANGE UP (ref 0–0.7)
EOSINOPHIL NFR BLD AUTO: 5.3 % — SIGNIFICANT CHANGE UP (ref 0–8)
EOSINOPHIL NFR BLD AUTO: 5.4 % — SIGNIFICANT CHANGE UP (ref 0–8)
GIANT PLATELETS BLD QL SMEAR: PRESENT — SIGNIFICANT CHANGE UP
GLUCOSE SERPL-MCNC: 101 MG/DL — HIGH (ref 70–99)
GLUCOSE SERPL-MCNC: 71 MG/DL — SIGNIFICANT CHANGE UP (ref 70–99)
HCT VFR BLD CALC: 21.7 % — LOW (ref 42–52)
HCT VFR BLD CALC: 35.4 % — LOW (ref 42–52)
HGB BLD-MCNC: 11.7 G/DL — LOW (ref 14–18)
HGB BLD-MCNC: 8 G/DL — LOW (ref 14–18)
IMM GRANULOCYTES NFR BLD AUTO: 0.5 % — HIGH (ref 0.1–0.3)
INR BLD: 1.42 RATIO — HIGH (ref 0.65–1.3)
INR BLD: 1.48 RATIO — HIGH (ref 0.65–1.3)
LYMPHOCYTES # BLD AUTO: 0.45 K/UL — LOW (ref 1.2–3.4)
LYMPHOCYTES # BLD AUTO: 0.89 K/UL — LOW (ref 1.2–3.4)
LYMPHOCYTES # BLD AUTO: 10.7 % — LOW (ref 20.5–51.1)
LYMPHOCYTES # BLD AUTO: 20.6 % — SIGNIFICANT CHANGE UP (ref 20.5–51.1)
MAGNESIUM SERPL-MCNC: 1.7 MG/DL — LOW (ref 1.8–2.4)
MAGNESIUM SERPL-MCNC: 1.7 MG/DL — LOW (ref 1.8–2.4)
MANUAL SMEAR VERIFICATION: SIGNIFICANT CHANGE UP
MCHC RBC-ENTMCNC: 33.1 G/DL — SIGNIFICANT CHANGE UP (ref 32–37)
MCHC RBC-ENTMCNC: 33.9 PG — HIGH (ref 27–31)
MCHC RBC-ENTMCNC: 36.9 G/DL — SIGNIFICANT CHANGE UP (ref 32–37)
MCHC RBC-ENTMCNC: 39.4 PG — HIGH (ref 27–31)
MCV RBC AUTO: 102.6 FL — HIGH (ref 80–94)
MCV RBC AUTO: 106.9 FL — HIGH (ref 80–94)
MONOCYTES # BLD AUTO: 0.26 K/UL — SIGNIFICANT CHANGE UP (ref 0.1–0.6)
MONOCYTES # BLD AUTO: 0.5 K/UL — SIGNIFICANT CHANGE UP (ref 0.1–0.6)
MONOCYTES NFR BLD AUTO: 11.6 % — HIGH (ref 1.7–9.3)
MONOCYTES NFR BLD AUTO: 6.2 % — SIGNIFICANT CHANGE UP (ref 1.7–9.3)
NEUTROPHILS # BLD AUTO: 2.65 K/UL — SIGNIFICANT CHANGE UP (ref 1.4–6.5)
NEUTROPHILS # BLD AUTO: 3.25 K/UL — SIGNIFICANT CHANGE UP (ref 1.4–6.5)
NEUTROPHILS NFR BLD AUTO: 61.3 % — SIGNIFICANT CHANGE UP (ref 42.2–75.2)
NEUTROPHILS NFR BLD AUTO: 77.7 % — HIGH (ref 42.2–75.2)
NRBC # BLD: 0 /100 WBCS — SIGNIFICANT CHANGE UP (ref 0–0)
PHOSPHATE SERPL-MCNC: 3.7 MG/DL — SIGNIFICANT CHANGE UP (ref 2.1–4.9)
PLAT MORPH BLD: NORMAL — SIGNIFICANT CHANGE UP
PLATELET # BLD AUTO: 80 K/UL — LOW (ref 130–400)
PLATELET # BLD AUTO: 91 K/UL — LOW (ref 130–400)
POIKILOCYTOSIS BLD QL AUTO: SLIGHT — SIGNIFICANT CHANGE UP
POTASSIUM SERPL-MCNC: 3.8 MMOL/L — SIGNIFICANT CHANGE UP (ref 3.5–5)
POTASSIUM SERPL-MCNC: 3.9 MMOL/L — SIGNIFICANT CHANGE UP (ref 3.5–5)
POTASSIUM SERPL-SCNC: 3.8 MMOL/L — SIGNIFICANT CHANGE UP (ref 3.5–5)
POTASSIUM SERPL-SCNC: 3.9 MMOL/L — SIGNIFICANT CHANGE UP (ref 3.5–5)
PROT SERPL-MCNC: 6.5 G/DL — SIGNIFICANT CHANGE UP (ref 6–8)
PROT SERPL-MCNC: 6.6 G/DL — SIGNIFICANT CHANGE UP (ref 6–8)
PROTHROM AB SERPL-ACNC: 16.3 SEC — HIGH (ref 9.95–12.87)
PROTHROM AB SERPL-ACNC: 17 SEC — HIGH (ref 9.95–12.87)
RBC # BLD: 2.03 M/UL — LOW (ref 4.7–6.1)
RBC # BLD: 3.45 M/UL — LOW (ref 4.7–6.1)
RBC # FLD: 13.7 % — SIGNIFICANT CHANGE UP (ref 11.5–14.5)
RBC # FLD: 13.8 % — SIGNIFICANT CHANGE UP (ref 11.5–14.5)
RBC BLD AUTO: ABNORMAL
SARS-COV-2 RNA SPEC QL NAA+PROBE: SIGNIFICANT CHANGE UP
SMUDGE CELLS # BLD: PRESENT — SIGNIFICANT CHANGE UP
SODIUM SERPL-SCNC: 134 MMOL/L — LOW (ref 135–146)
SODIUM SERPL-SCNC: 139 MMOL/L — SIGNIFICANT CHANGE UP (ref 135–146)
WBC # BLD: 4.18 K/UL — LOW (ref 4.8–10.8)
WBC # BLD: 4.32 K/UL — LOW (ref 4.8–10.8)
WBC # FLD AUTO: 4.18 K/UL — LOW (ref 4.8–10.8)
WBC # FLD AUTO: 4.32 K/UL — LOW (ref 4.8–10.8)

## 2021-08-08 PROCEDURE — 99233 SBSQ HOSP IP/OBS HIGH 50: CPT

## 2021-08-08 RX ORDER — SODIUM CHLORIDE 9 MG/ML
1000 INJECTION, SOLUTION INTRAVENOUS
Refills: 0 | Status: DISCONTINUED | OUTPATIENT
Start: 2021-08-08 | End: 2021-08-08

## 2021-08-08 RX ORDER — SODIUM BICARBONATE 1 MEQ/ML
0.11 SYRINGE (ML) INTRAVENOUS
Qty: 150 | Refills: 0 | Status: DISCONTINUED | OUTPATIENT
Start: 2021-08-08 | End: 2021-08-08

## 2021-08-08 RX ORDER — SODIUM BICARBONATE 1 MEQ/ML
0.11 SYRINGE (ML) INTRAVENOUS
Qty: 150 | Refills: 0 | Status: DISCONTINUED | OUTPATIENT
Start: 2021-08-08 | End: 2021-08-09

## 2021-08-08 RX ORDER — MAGNESIUM SULFATE 500 MG/ML
2 VIAL (ML) INJECTION ONCE
Refills: 0 | Status: COMPLETED | OUTPATIENT
Start: 2021-08-08 | End: 2021-08-08

## 2021-08-08 RX ADMIN — Medication 1 APPLICATION(S): at 05:52

## 2021-08-08 RX ADMIN — MAGNESIUM OXIDE 400 MG ORAL TABLET 400 MILLIGRAM(S): 241.3 TABLET ORAL at 11:25

## 2021-08-08 RX ADMIN — MAGNESIUM OXIDE 400 MG ORAL TABLET 400 MILLIGRAM(S): 241.3 TABLET ORAL at 17:11

## 2021-08-08 RX ADMIN — SODIUM CHLORIDE 75 MILLILITER(S): 9 INJECTION, SOLUTION INTRAVENOUS at 05:53

## 2021-08-08 RX ADMIN — Medication 100 MILLIGRAM(S): at 13:34

## 2021-08-08 RX ADMIN — Medication 75 MEQ/KG/HR: at 17:00

## 2021-08-08 RX ADMIN — Medication 1 APPLICATION(S): at 17:10

## 2021-08-08 RX ADMIN — HEPARIN SODIUM 5000 UNIT(S): 5000 INJECTION INTRAVENOUS; SUBCUTANEOUS at 05:50

## 2021-08-08 RX ADMIN — Medication 100 MILLIGRAM(S): at 05:50

## 2021-08-08 RX ADMIN — MAGNESIUM OXIDE 400 MG ORAL TABLET 400 MILLIGRAM(S): 241.3 TABLET ORAL at 08:00

## 2021-08-08 RX ADMIN — CHLORHEXIDINE GLUCONATE 1 APPLICATION(S): 213 SOLUTION TOPICAL at 05:49

## 2021-08-08 RX ADMIN — HEPARIN SODIUM 5000 UNIT(S): 5000 INJECTION INTRAVENOUS; SUBCUTANEOUS at 21:26

## 2021-08-08 RX ADMIN — CEFEPIME 100 MILLIGRAM(S): 1 INJECTION, POWDER, FOR SOLUTION INTRAMUSCULAR; INTRAVENOUS at 11:25

## 2021-08-08 RX ADMIN — Medication 75 MEQ/KG/HR: at 17:10

## 2021-08-08 RX ADMIN — Medication 650 MILLIGRAM(S): at 13:34

## 2021-08-08 RX ADMIN — HEPARIN SODIUM 5000 UNIT(S): 5000 INJECTION INTRAVENOUS; SUBCUTANEOUS at 13:34

## 2021-08-08 RX ADMIN — Medication 650 MILLIGRAM(S): at 05:51

## 2021-08-08 RX ADMIN — Medication 1 APPLICATION(S): at 05:55

## 2021-08-08 RX ADMIN — Medication 25 MILLIGRAM(S): at 05:49

## 2021-08-08 RX ADMIN — Medication 100 MILLIGRAM(S): at 21:27

## 2021-08-08 NOTE — PROGRESS NOTE ADULT - SUBJECTIVE AND OBJECTIVE BOX
JUDY PEÑALOZA    Patient is a 65y old  Male who presents with a chief complaint of RLE wound (07 Aug 2021 11:13)    INTERVAL HPI/OVERNIGHT EVENTS: no events overnight, no complaints     CONSTITUTIONAL: No weakness, fevers or chills  RESPIRATORY: No cough, wheezing, hemoptysis; No shortness of breath  CARDIOVASCULAR: No chest pain or palpitations  GASTROINTESTINAL: No abdominal or epigastric pain. No nausea, vomiting, or hematemesis; No diarrhea or constipation. No melena or hematochezia.  GENITOURINARY: No dysuria, frequency or hematuria  NEUROLOGICAL: No numbness or weakness  SKIN: right leg ulcers    PHYSICAL EXAM:  T(C): 36.3, Max: 36.4 (21 @ 20:50)  HR: 85 (80 - 85)  BP: 125/65 (125/65 - 142/61)  RR: 18 (18 - 18)  SpO2: --    GENERAL: NAD  PULMONARY/CHEST: No rales, rhonchi, or wheezing  CARDIOVASC: Regular rate and rhythm; No murmurs  GI/ABDOMEN: Soft, Nontender, Nondistended; Bowel sounds present  EXTREMITIES: s/p left AKA, RLE with chronic venous stasis, +distal leg medial and lateral side wound.   NERVOUS SYSTEM:  Alert & Oriented X3, no gross neurological deficit     LABS:                        11.7   4.18  )-----------( 80       ( 08 Aug 2021 06:40 )             35.4     -08    134<L>  |  113<H>  |  32<H>  ----------------------------<  71  3.8   |  14<L>  |  2.3<H>    Ca    7.3<L>      08 Aug 2021 06:40  Mg     1.7     -    TPro  6.6  /  Alb  1.8<L>  /  TBili  1.1  /  DBili  x   /  AST  66<H>  /  ALT  29  /  AlkPhos  185<H>  08    PT/INR - ( 08 Aug 2021 06:40 )   PT: 16.30 sec;   INR: 1.42 ratio           Urinalysis Basic - ( 06 Aug 2021 15:52 )    Color: Yellow / Appearance: Clear / S.013 / pH: x  Gluc: x / Ketone: Negative  / Bili: Negative / Urobili: 3 mg/dL   Blood: x / Protein: 30 mg/dL / Nitrite: Negative   Leuk Esterase: Negative / RBC: 431 /HPF / WBC 8 /HPF   Sq Epi: x / Non Sq Epi: 1 /HPF / Bacteria: Negative      Culture - Blood (collected 06 Aug 2021 07:48)  Source: .Blood None  Preliminary Report (07 Aug 2021 19:01):    No growth to date.      MEDICATIONS  (STANDING):  cefepime   IVPB 2000 milliGRAM(s) IV Intermittent daily  chlorhexidine 4% Liquid 1 Application(s) Topical <User Schedule>  collagenase Ointment 1 Application(s) Topical two times a day  collagenase Ointment 1 Application(s) Topical two times a day  Dakins Solution - Full Strength 1 Application(s) Topical two times a day  heparin   Injectable 5000 Unit(s) SubCutaneous every 8 hours  lactated ringers. 1000 milliLiter(s) (75 mL/Hr) IV Continuous <Continuous>  lactated ringers. 1000 milliLiter(s) (75 mL/Hr) IV Continuous <Continuous>  magnesium oxide 400 milliGRAM(s) Oral three times a day with meals  metoprolol succinate ER 25 milliGRAM(s) Oral daily  metroNIDAZOLE  IVPB 500 milliGRAM(s) IV Intermittent every 8 hours  sodium bicarbonate 650 milliGRAM(s) Oral every 8 hours    MEDICATIONS  (PRN):  acetaminophen   Tablet .. 650 milliGRAM(s) Oral every 6 hours PRN Temp greater or equal to 38.5C (101.3F), Mild Pain (1 - 3)

## 2021-08-08 NOTE — PROGRESS NOTE ADULT - ASSESSMENT
"65 year old gentleman with a PMHx of HTN and left-sided AKA, presenting from [facility] with a chief complaint of RLE leg wound.   He was recently admitted for RLE cellulitis and venous stasis ulcers, treated with cefepime and vanco, completed Amoxicillin and Doxy as an outpatient. He was seen today by vascular surgery and referred to the ED to be further evaluated by burn for possible debridement.     # Non-healing LE venous stasis ulcers with superinfection. No sepsis.  - ABx as per ID - Cefepime and Flagyl   - 6/23 WCX pseudomonas, MSSA  - burn eval appreciated, scheduled for OR tomorrow for washout, please send Cx  - NPO after MN  - wound care by burn    # Chronic macrocytic anemia due to ACD (CKD)  - Iron studies consistent with anemia of chronic disease   - Folate, vit b12 WNL    # MARLENE on CKD3A  - creatinine with very mild improvement  - no hydro on US  - monitor Cr daily  - f/u urine studies, cr/prot, urine lytes  - nephro on board  - meds renally dosed    # PAD - s/p Left BKA  - US duplex in 06/2021 of RLE normal flow    # Metabolic acidosis due to MARLENE - will do IV sodium bicarb    # Chronic hyponatremia - Na better after hydration, possible dehydration.    # Hypokalemia - supplemented    # Obesity BMI (kg/m2): 34.5    DVt ppx     #Progress Note Handoff  Pending OR tomorrow

## 2021-08-08 NOTE — PROGRESS NOTE ADULT - ASSESSMENT
# MARLENE on CKD 3A prerenal most likely/ wound infection   - non oliguric  - creatinine down-trending  - please document strict i/o   - change iv fluids to LR at 50cc/hr  - add sodium bicarb 1300mg q8h   - renal ultrasound noted w/o hydronephrosis   - phos level noted, does not need binder  - appreciate ID eval, cont cefepime/flagyl, appreciate burn f/u for OR, f/u cultures  dose abx per eGFR  no need for RRT

## 2021-08-08 NOTE — PROGRESS NOTE ADULT - SUBJECTIVE AND OBJECTIVE BOX
Nephrology Progress Note    JUDY PEÑALOZA  MRN-[id number]  65y  Male    S:  Patient is seen and examined, events over the last 24h noted.    O:  Allergies:  No Known Allergies    Hospital Medications:   MEDICATIONS  (STANDING):  cefepime   IVPB 2000 milliGRAM(s) IV Intermittent daily  chlorhexidine 4% Liquid 1 Application(s) Topical <User Schedule>  collagenase Ointment 1 Application(s) Topical two times a day  collagenase Ointment 1 Application(s) Topical two times a day  Dakins Solution - Full Strength 1 Application(s) Topical two times a day  heparin   Injectable 5000 Unit(s) SubCutaneous every 8 hours  magnesium oxide 400 milliGRAM(s) Oral three times a day with meals  metoprolol succinate ER 25 milliGRAM(s) Oral daily  metroNIDAZOLE  IVPB 500 milliGRAM(s) IV Intermittent every 8 hours  sodium bicarbonate  Infusion 0.113 mEq/kG/Hr (75 mL/Hr) IV Continuous <Continuous>    MEDICATIONS  (PRN):  acetaminophen   Tablet .. 650 milliGRAM(s) Oral every 6 hours PRN Temp greater or equal to 38.5C (101.3F), Mild Pain (1 - 3)    Home Medications:  acetaminophen 325 mg oral tablet: 2 tab(s) orally every 6 hours, As needed, Temp greater or equal to 38C (100.4F), Mild Pain (1 - 3) (06 Aug 2021 00:00)  Aldactone 25 mg oral tablet: one tablet orally daily (06 Aug 2021 00:00)  Lasix 20 mg oral tablet: three tablets daily (06 Aug 2021 00:00)  metoprolol succinate 25 mg oral tablet, extended release: 1 tab(s) orally once a day (06 Aug 2021 00:00)  Toprol-XL 25 mg oral tablet, extended release: 1 tab(s) orally once a day (06 Aug 2021 00:00)  traMADol 50 mg oral tablet: 1 tab(s) orally every 6 hours, As Needed (06 Aug 2021 00:00)      VITALS:  Daily     Daily   T(F): 97.5 (21 @ 21:12), Max: 97.5 (21 @ 21:12)  HR: 85 (21 @ 21:12)  BP: 128/68 (21 @ 21:12)  RR: 18 (21 @ 21:12)  SpO2: --  Wt(kg): --  I&O's Detail    07 Aug 2021 07:01  -  08 Aug 2021 07:00  --------------------------------------------------------  IN:  Total IN: 0 mL    OUT:    Voided (mL): 150 mL  Total OUT: 150 mL    Total NET: -150 mL        I&O's Summary    07 Aug 2021 07:01  -  08 Aug 2021 07:00  --------------------------------------------------------  IN: 0 mL / OUT: 150 mL / NET: -150 mL          PHYSICAL EXAM:  Gen: NAD  Resp: CTAB  Card: S1/S2  Abd: soft  Extremities: L BKA      LABS:          139  |  113<H>  |  30<H>  ----------------------------<  101<H>  3.9   |  19  |  2.1<H>    Ca    7.3<L>      08 Aug 2021 20:51  Phos  3.7       Mg     1.7         TPro  6.5  /  Alb  1.8<L>  /  TBili  1.0  /  DBili      /  AST  66<H>  /  ALT  29  /  AlkPhos  173<H>      eGFR if Non African American: 32 mL/min/1.73M2 (21 @ 20:51)  eGFR if : 37 mL/min/1.73M2 (21 @ 20:51)    Phosphorus Level, Serum: 3.7 mg/dL (21 @ 20:51)  Phosphorus Level, Serum: 2.9 mg/dL (18 @ 22:13)                            8.0    4.32  )-----------( 91       ( 08 Aug 2021 20:51 )             21.7     Mean Cell Volume: 106.9 fL (21 @ 20:51)    Ferritin, Serum: 825 ng/mL (21 @ 06:41)  % Saturation, Iron: 54 % (21 @ 07:48)      Urine Studies:  Urinalysis Basic - ( 06 Aug 2021 15:52 )    Color: Yellow / Appearance: Clear / S.013 / pH:   Gluc:  / Ketone: Negative  / Bili: Negative / Urobili: 3 mg/dL   Blood:  / Protein: 30 mg/dL / Nitrite: Negative   Leuk Esterase: Negative / RBC: 431 /HPF / WBC 8 /HPF   Sq Epi:  / Non Sq Epi: 1 /HPF / Bacteria: Negative        Urea Nitrogen,  Random Urine: 471 mg/dL (21 @ 15:52)    Protein/Creatinine Ratio Calculation: 0.7 Ratio ( @ 15:52)  Creatinine, Random Urine: 67 mg/dL ( @ 15:52)    Creatinine trend:  Creatinine, Serum: 2.1 mg/dL (21 @ 20:51)  Creatinine, Serum: 2.3 mg/dL (21 @ 06:40)  Creatinine, Serum: 2.7 mg/dL (21 @ 06:41)  Creatinine, Serum: 2.8 mg/dL (21 @ 21:37)  Creatinine, Serum: 2.7 mg/dL (21 @ 07:48)  Creatinine, Serum: 3.0 mg/dL (21 @ 10:45)  Creatinine, Serum: 0.9 mg/dL (21 @ 09:08)      Hepatitis C Virus S/CO Ratio: 0.87 S/CO (18 @ 05:07)    US Renal:   EXAM:  US KIDNEY(S)            PROCEDURE DATE:  2021            INTERPRETATION:  CLINICAL INFORMATION: Worsening renal function.    COMPARISON: December 3, 2018    TECHNIQUE: Sonography of the kidneys and bladder.    FINDINGS:    Right kidney: 11.4 cm. No hydronephrosis or calculi.    Left kidney:  11.3 cm. No hydronephrosis or calculi.    Urinary bladder: Patient voided prior to the study.        IMPRESSION:    Normal renal ultrasound.

## 2021-08-09 ENCOUNTER — TRANSCRIPTION ENCOUNTER (OUTPATIENT)
Age: 66
End: 2021-08-09

## 2021-08-09 LAB
ALBUMIN SERPL ELPH-MCNC: 1.7 G/DL — LOW (ref 3.5–5.2)
ALP SERPL-CCNC: 186 U/L — HIGH (ref 30–115)
ALT FLD-CCNC: 29 U/L — SIGNIFICANT CHANGE UP (ref 0–41)
ANION GAP SERPL CALC-SCNC: 9 MMOL/L — SIGNIFICANT CHANGE UP (ref 7–14)
AST SERPL-CCNC: 68 U/L — HIGH (ref 0–41)
BASOPHILS # BLD AUTO: 0.02 K/UL — SIGNIFICANT CHANGE UP (ref 0–0.2)
BASOPHILS NFR BLD AUTO: 0.4 % — SIGNIFICANT CHANGE UP (ref 0–1)
BILIRUB SERPL-MCNC: 0.9 MG/DL — SIGNIFICANT CHANGE UP (ref 0.2–1.2)
BUN SERPL-MCNC: 29 MG/DL — HIGH (ref 10–20)
CALCIUM SERPL-MCNC: 7.2 MG/DL — LOW (ref 8.5–10.1)
CHLORIDE SERPL-SCNC: 112 MMOL/L — HIGH (ref 98–110)
CO2 SERPL-SCNC: 19 MMOL/L — SIGNIFICANT CHANGE UP (ref 17–32)
CREAT SERPL-MCNC: 2.1 MG/DL — HIGH (ref 0.7–1.5)
EOSINOPHIL # BLD AUTO: 0.22 K/UL — SIGNIFICANT CHANGE UP (ref 0–0.7)
EOSINOPHIL NFR BLD AUTO: 4.9 % — SIGNIFICANT CHANGE UP (ref 0–8)
GLUCOSE SERPL-MCNC: 99 MG/DL — SIGNIFICANT CHANGE UP (ref 70–99)
HCT VFR BLD CALC: 25.6 % — LOW (ref 42–52)
HGB BLD-MCNC: 8.5 G/DL — LOW (ref 14–18)
IMM GRANULOCYTES NFR BLD AUTO: 0.9 % — HIGH (ref 0.1–0.3)
LYMPHOCYTES # BLD AUTO: 0.97 K/UL — LOW (ref 1.2–3.4)
LYMPHOCYTES # BLD AUTO: 21.6 % — SIGNIFICANT CHANGE UP (ref 20.5–51.1)
MAGNESIUM SERPL-MCNC: 1.7 MG/DL — LOW (ref 1.8–2.4)
MCHC RBC-ENTMCNC: 33.2 G/DL — SIGNIFICANT CHANGE UP (ref 32–37)
MCHC RBC-ENTMCNC: 34.4 PG — HIGH (ref 27–31)
MCV RBC AUTO: 103.6 FL — HIGH (ref 80–94)
MONOCYTES # BLD AUTO: 0.42 K/UL — SIGNIFICANT CHANGE UP (ref 0.1–0.6)
MONOCYTES NFR BLD AUTO: 9.4 % — HIGH (ref 1.7–9.3)
NEUTROPHILS # BLD AUTO: 2.82 K/UL — SIGNIFICANT CHANGE UP (ref 1.4–6.5)
NEUTROPHILS NFR BLD AUTO: 62.8 % — SIGNIFICANT CHANGE UP (ref 42.2–75.2)
NRBC # BLD: 0 /100 WBCS — SIGNIFICANT CHANGE UP (ref 0–0)
PLATELET # BLD AUTO: 86 K/UL — LOW (ref 130–400)
POTASSIUM SERPL-MCNC: 3.6 MMOL/L — SIGNIFICANT CHANGE UP (ref 3.5–5)
POTASSIUM SERPL-SCNC: 3.6 MMOL/L — SIGNIFICANT CHANGE UP (ref 3.5–5)
PROT SERPL-MCNC: 6.6 G/DL — SIGNIFICANT CHANGE UP (ref 6–8)
RBC # BLD: 2.47 M/UL — LOW (ref 4.7–6.1)
RBC # FLD: 13.6 % — SIGNIFICANT CHANGE UP (ref 11.5–14.5)
SODIUM SERPL-SCNC: 140 MMOL/L — SIGNIFICANT CHANGE UP (ref 135–146)
WBC # BLD: 4.49 K/UL — LOW (ref 4.8–10.8)
WBC # FLD AUTO: 4.49 K/UL — LOW (ref 4.8–10.8)

## 2021-08-09 PROCEDURE — 99233 SBSQ HOSP IP/OBS HIGH 50: CPT

## 2021-08-09 RX ORDER — SODIUM BICARBONATE 1 MEQ/ML
2 SYRINGE (ML) INTRAVENOUS
Qty: 180 | Refills: 1
Start: 2021-08-09 | End: 2021-10-07

## 2021-08-09 RX ORDER — METRONIDAZOLE 500 MG
1 TABLET ORAL
Qty: 15 | Refills: 0
Start: 2021-08-09 | End: 2021-08-13

## 2021-08-09 RX ORDER — SPIRONOLACTONE 25 MG/1
0 TABLET, FILM COATED ORAL
Qty: 0 | Refills: 0 | DISCHARGE

## 2021-08-09 RX ORDER — METOPROLOL TARTRATE 50 MG
1 TABLET ORAL
Qty: 0 | Refills: 0 | DISCHARGE

## 2021-08-09 RX ORDER — SODIUM CHLORIDE 9 MG/ML
1000 INJECTION, SOLUTION INTRAVENOUS
Refills: 0 | Status: DISCONTINUED | OUTPATIENT
Start: 2021-08-09 | End: 2021-08-10

## 2021-08-09 RX ORDER — FUROSEMIDE 40 MG
0 TABLET ORAL
Qty: 0 | Refills: 0 | DISCHARGE

## 2021-08-09 RX ORDER — CIPROFLOXACIN LACTATE 400MG/40ML
1 VIAL (ML) INTRAVENOUS
Qty: 5 | Refills: 0
Start: 2021-08-09 | End: 2021-08-13

## 2021-08-09 RX ORDER — MAGNESIUM SULFATE 500 MG/ML
2 VIAL (ML) INJECTION ONCE
Refills: 0 | Status: COMPLETED | OUTPATIENT
Start: 2021-08-09 | End: 2021-08-09

## 2021-08-09 RX ORDER — SODIUM BICARBONATE 1 MEQ/ML
1300 SYRINGE (ML) INTRAVENOUS EVERY 8 HOURS
Refills: 0 | Status: DISCONTINUED | OUTPATIENT
Start: 2021-08-09 | End: 2021-08-10

## 2021-08-09 RX ADMIN — Medication 1 APPLICATION(S): at 05:14

## 2021-08-09 RX ADMIN — Medication 1 APPLICATION(S): at 05:15

## 2021-08-09 RX ADMIN — HEPARIN SODIUM 5000 UNIT(S): 5000 INJECTION INTRAVENOUS; SUBCUTANEOUS at 13:57

## 2021-08-09 RX ADMIN — HEPARIN SODIUM 5000 UNIT(S): 5000 INJECTION INTRAVENOUS; SUBCUTANEOUS at 21:29

## 2021-08-09 RX ADMIN — Medication 50 GRAM(S): at 00:47

## 2021-08-09 RX ADMIN — Medication 50 GRAM(S): at 17:21

## 2021-08-09 RX ADMIN — Medication 1 APPLICATION(S): at 17:03

## 2021-08-09 RX ADMIN — Medication 1300 MILLIGRAM(S): at 21:29

## 2021-08-09 RX ADMIN — Medication 100 MILLIGRAM(S): at 21:29

## 2021-08-09 RX ADMIN — CHLORHEXIDINE GLUCONATE 1 APPLICATION(S): 213 SOLUTION TOPICAL at 05:14

## 2021-08-09 RX ADMIN — Medication 100 MILLIGRAM(S): at 13:55

## 2021-08-09 RX ADMIN — SODIUM CHLORIDE 50 MILLILITER(S): 9 INJECTION, SOLUTION INTRAVENOUS at 17:21

## 2021-08-09 RX ADMIN — Medication 25 MILLIGRAM(S): at 05:15

## 2021-08-09 RX ADMIN — HEPARIN SODIUM 5000 UNIT(S): 5000 INJECTION INTRAVENOUS; SUBCUTANEOUS at 05:15

## 2021-08-09 RX ADMIN — Medication 100 MILLIGRAM(S): at 05:15

## 2021-08-09 RX ADMIN — MAGNESIUM OXIDE 400 MG ORAL TABLET 400 MILLIGRAM(S): 241.3 TABLET ORAL at 11:09

## 2021-08-09 RX ADMIN — CEFEPIME 100 MILLIGRAM(S): 1 INJECTION, POWDER, FOR SOLUTION INTRAMUSCULAR; INTRAVENOUS at 11:02

## 2021-08-09 NOTE — DISCHARGE NOTE PROVIDER - PROVIDER TOKENS
PROVIDER:[TOKEN:[[id number]],FOLLOWUP:[1 week]],PROVIDER:[TOKEN:[[id number]],FOLLOWUP:[1 week]],PROVIDER:[TOKEN:[[id number]],FOLLOWUP:[1 week]]

## 2021-08-09 NOTE — PROGRESS NOTE ADULT - SUBJECTIVE AND OBJECTIVE BOX
JUDY PEÑALOZA  65y, Male  Allergy: No Known Allergies      LOS  4d    CHIEF COMPLAINT: RLE wound (09 Aug 2021 08:11)      INTERVAL EVENTS/HPI  - No acute events overnight  - T(F): , Max: 97.5 (08-08-21 @ 21:12)  - Denies any worsening symptoms  - Tolerating medication  - WBC Count: 4.49 (08-09-21 @ 04:30)  WBC Count: 4.32 (08-08-21 @ 20:51)     - Creatinine, Serum: 2.1 (08-09-21 @ 04:30)  Creatinine, Serum: 2.1 (08-08-21 @ 20:51)       ROS  General: Denies rigors, nightsweats  HEENT: Denies headache, rhinorrhea, sore throat, eye pain  CV: Denies CP, palpitations  PULM: Denies wheezing, hemoptysis  GI: Denies hematemesis, hematochezia, melena  : Denies discharge, hematuria  MSK: Denies arthralgias, myalgias  SKIN: Denies rash, lesions  NEURO: Denies paresthesias, weakness  PSYCH: Denies depression, anxiety    VITALS:  T(F): 97.5, Max: 97.5 (08-08-21 @ 21:12)  HR: 89  BP: 133/62  RR: 18Vital Signs Last 24 Hrs  T(C): 36.4 (09 Aug 2021 05:23), Max: 36.4 (08 Aug 2021 21:12)  T(F): 97.5 (09 Aug 2021 05:23), Max: 97.5 (08 Aug 2021 21:12)  HR: 89 (09 Aug 2021 05:23) (85 - 89)  BP: 133/62 (09 Aug 2021 05:23) (125/65 - 133/62)  BP(mean): --  RR: 18 (09 Aug 2021 05:23) (18 - 18)  SpO2: 98% (09 Aug 2021 05:23) (98% - 98%)    PHYSICAL EXAM:  Gen: NAD, resting in bed  HEENT: Normocephalic, atraumatic  Neck: supple, no lymphadenopathy  CV: Regular rate & regular rhythm  Lungs: decreased BS at bases, no fremitus  Abdomen: Soft, BS present  Ext: Warm, well perfused, LE dressings R, AKA  Neuro: non focal, awake  Skin: no rash, no erythema  Lines: no phlebitis    FH: Non-contributory  Social Hx: Non-contributory    TESTS & MEASUREMENTS:                        8.5    4.49  )-----------( 86       ( 09 Aug 2021 04:30 )             25.6     08-09    140  |  112<H>  |  29<H>  ----------------------------<  99  3.6   |  19  |  2.1<H>    Ca    7.2<L>      09 Aug 2021 04:30  Phos  3.7     08-08  Mg     1.7     08-09    TPro  6.6  /  Alb  1.7<L>  /  TBili  0.9  /  DBili  x   /  AST  68<H>  /  ALT  29  /  AlkPhos  186<H>  08-09    eGFR if Non African American: 32 mL/min/1.73M2 (08-09-21 @ 04:30)  eGFR if African American: 37 mL/min/1.73M2 (08-09-21 @ 04:30)  eGFR if Non African American: 32 mL/min/1.73M2 (08-08-21 @ 20:51)  eGFR if : 37 mL/min/1.73M2 (08-08-21 @ 20:51)    LIVER FUNCTIONS - ( 09 Aug 2021 04:30 )  Alb: 1.7 g/dL / Pro: 6.6 g/dL / ALK PHOS: 186 U/L / ALT: 29 U/L / AST: 68 U/L / GGT: x               Culture - Blood (collected 08-06-21 @ 07:48)  Source: .Blood None  Preliminary Report (08-07-21 @ 19:01):    No growth to date.            INFECTIOUS DISEASES TESTING  COVID-19 PCR: NotDetec (08-08-21 @ 14:36)  Procalcitonin, Serum: 0.58 (08-06-21 @ 07:48)  COVID-19 PCR: NotDetec (08-05-21 @ 11:30)  Vancomycin Level, Trough: 14.2 (06-25-21 @ 09:08)  Procalcitonin, Serum: 8.43 (06-23-21 @ 07:01)  COVID-19 PCR: NotDetec (06-22-21 @ 21:57)      INFLAMMATORY MARKERS  Sedimentation Rate, Erythrocyte: 75 mm/Hr (08-06-21 @ 07:48)  C-Reactive Protein, Serum: 66 mg/L (08-06-21 @ 07:48)  Sedimentation Rate, Erythrocyte: 82 mm/Hr (06-23-21 @ 07:01)  C-Reactive Protein, Serum: 125 mg/L (06-23-21 @ 07:01)      RADIOLOGY & ADDITIONAL TESTS:  I have personally reviewed the last available Chest xray  CXR      CT      CARDIOLOGY TESTING  12 Lead ECG:   Ventricular Rate 90 BPM    Atrial Rate 90 BPM    P-R Interval 132 ms    QRS Duration 142 ms    Q-T Interval 474 ms    QTC Calculation(Bazett) 579 ms    P Axis 62 degrees    R Axis 51 degrees    T Axis 17 degrees    Diagnosis Line Normal sinus rhythm  Right bundle branch block  Abnormal ECG    Confirmed by Rodrigo Ferraro ([id number]) on 8/5/2021 6:05:20 PM (08-05-21 @ 11:20)      MEDICATIONS  cefepime   IVPB 2000 IV Intermittent daily  chlorhexidine 4% Liquid 1 Topical <User Schedule>  collagenase Ointment 1 Topical two times a day  collagenase Ointment 1 Topical two times a day  Dakins Solution - Full Strength 1 Topical two times a day  heparin   Injectable 5000 SubCutaneous every 8 hours  magnesium oxide 400 Oral three times a day with meals  metoprolol succinate ER 25 Oral daily  metroNIDAZOLE  IVPB 500 IV Intermittent every 8 hours  sodium bicarbonate  Infusion 0.113 IV Continuous <Continuous>      WEIGHT  Weight (kg): 100 (08-07-21 @ 10:26)  Creatinine, Serum: 2.1 mg/dL (08-09-21 @ 04:30)  Creatinine, Serum: 2.1 mg/dL (08-08-21 @ 20:51)      ANTIBIOTICS:  cefepime   IVPB 2000 milliGRAM(s) IV Intermittent daily  metroNIDAZOLE  IVPB 500 milliGRAM(s) IV Intermittent every 8 hours      All available historical records have been reviewed

## 2021-08-09 NOTE — DISCHARGE NOTE PROVIDER - NSDCMRMEDTOKEN_GEN_ALL_CORE_FT
acetaminophen 325 mg oral tablet: 2 tab(s) orally every 6 hours, As needed, Temp greater or equal to 38C (100.4F), Mild Pain (1 - 3)  Aldactone 25 mg oral tablet: one tablet orally daily  collagenase 250 units/g topical ointment: 1 application topically 2 times a day  Lasix 20 mg oral tablet: three tablets daily  metoprolol succinate 25 mg oral tablet, extended release: 1 tab(s) orally once a day  sodium hypochlorite 0.5% topical solution: 1 application topically 2 times a day  Toprol-XL 25 mg oral tablet, extended release: 1 tab(s) orally once a day  traMADol 50 mg oral tablet: 1 tab(s) orally every 6 hours, As Needed   acetaminophen 325 mg oral tablet: 2 tab(s) orally every 6 hours, As needed, Temp greater or equal to 38C (100.4F), Mild Pain (1 - 3)  Aldactone 25 mg oral tablet: one tablet orally daily  collagenase 250 units/g topical ointment: 1 application topically 2 times a day  metoprolol succinate 25 mg oral tablet, extended release: 1 tab(s) orally once a day  sodium hypochlorite 0.5% topical solution: 1 application topically 2 times a day  traMADol 50 mg oral tablet: 1 tab(s) orally every 6 hours, As Needed   acetaminophen 325 mg oral tablet: 2 tab(s) orally every 6 hours, As needed, Temp greater or equal to 38C (100.4F), Mild Pain (1 - 3)  collagenase 250 units/g topical ointment: 1 application topically 2 times a day  levoFLOXacin 750 mg oral tablet: 1 tab(s) orally every 48 hours  end 8/20  metoprolol succinate 25 mg oral tablet, extended release: 1 tab(s) orally once a day  metroNIDAZOLE 500 mg oral tablet: 1 tab(s) orally every 8 hours end 8/15  sodium bicarbonate 650 mg oral tablet: 2 tab(s) orally every 8 hours  sodium hypochlorite 0.5% topical solution: 1 application topically 2 times a day  traMADol 50 mg oral tablet: 1 tab(s) orally every 6 hours, As Needed   acetaminophen 325 mg oral tablet: 2 tab(s) orally every 6 hours, As needed, Temp greater or equal to 38C (100.4F), Mild Pain (1 - 3)  collagenase 250 units/g topical ointment: 1 application topically 2 times a day  levoFLOXacin 750 mg oral tablet: 1 tab(s) orally every 48 hours  end 8/20  metoprolol succinate 25 mg oral tablet, extended release: 1 tab(s) orally once a day  metroNIDAZOLE 500 mg oral tablet: 1 tab(s) orally every 8 hours end 8/15  sodium bicarbonate 650 mg oral tablet: 1 tab(s) orally every 8 hours   sodium hypochlorite 0.5% topical solution: 1 application topically 2 times a day  traMADol 50 mg oral tablet: 1 tab(s) orally every 6 hours, As Needed

## 2021-08-09 NOTE — CHART NOTE - NSCHARTNOTEFT_GEN_A_CORE
Pt seen and examined at bedside. Pt is medically stable for discharge and PE is stable. Labs and vitals are stable. Pt for discharge today/tomorrow to assisted living.     SUBJECTIVE:    Patient is a 65y old Male who presents with a chief complaint of RLE wound (09 Aug 2021 16:53)    Currently admitted to medicine with the primary diagnosis of Nonhealing ulcer of right lower extremity       Today is hospital day 4d. This morning he is resting comfortably in bed and reports no new issues or overnight events.     ROS:   CONSTITUTIONAL: No weakness, fevers or chills   EYES/ENT: No visual changes; No vertigo or throat pain   NECK: No pain or stiffness   RESPIRATORY: No cough, wheezing, hemoptysis; No shortness of breath   CARDIOVASCULAR: No chest pain or palpitations   GASTROINTESTINAL: No abdominal or epigastric pain. No nausea, vomiting, or hematemesis; No diarrhea or constipation. No melena or hematochezia.  GENITOURINARY: No dysuria, frequency or hematuria  NEUROLOGICAL: No numbness or weakness  SKIN: No itching, rashes      PAST MEDICAL & SURGICAL HISTORY  Hypertension    GERD (gastroesophageal reflux disease)    Edema    Circulation disorder of lower extremity  left lower extremity    S/P BKA (below knee amputation), left      SOCIAL HISTORY:    ALLERGIES:  No Known Allergies    MEDICATIONS:  STANDING MEDICATIONS  cefepime   IVPB 2000 milliGRAM(s) IV Intermittent daily  chlorhexidine 4% Liquid 1 Application(s) Topical <User Schedule>  collagenase Ointment 1 Application(s) Topical two times a day  Dakins Solution - Full Strength 1 Application(s) Topical two times a day  heparin   Injectable 5000 Unit(s) SubCutaneous every 8 hours  lactated ringers. 1000 milliLiter(s) IV Continuous <Continuous>  metoprolol succinate ER 25 milliGRAM(s) Oral daily  metroNIDAZOLE  IVPB 500 milliGRAM(s) IV Intermittent every 8 hours  sodium bicarbonate 1300 milliGRAM(s) Oral every 8 hours    PRN MEDICATIONS  acetaminophen   Tablet .. 650 milliGRAM(s) Oral every 6 hours PRN    VITALS:   T(F): 97.2  HR: 90  BP: 132/58  RR: 18  SpO2: 98%    LABS:  Negative for smoking/alcohol/drug use.                         8.5    4.49  )-----------( 86       ( 09 Aug 2021 04:30 )             25.6     08-09    140  |  112<H>  |  29<H>  ----------------------------<  99  3.6   |  19  |  2.1<H>    Ca    7.2<L>      09 Aug 2021 04:30  Phos  3.7     08-08  Mg     1.7     08-09    TPro  6.6  /  Alb  1.7<L>  /  TBili  0.9  /  DBili  x   /  AST  68<H>  /  ALT  29  /  AlkPhos  186<H>  08-09    PT/INR - ( 08 Aug 2021 20:51 )   PT: 17.00 sec;   INR: 1.48 ratio    PTT - ( 08 Aug 2021 20:51 )  PTT:35.5 sec      RADIOLOGY:  US Renal (08.06.21 @ 08:26)   IMPRESSION:  Normal renal ultrasound.    Xray Chest 1 View- PORTABLE-Routine (Xray Chest 1 View- PORTABLE-Routine .) (08.05.21 @ 11:42) >  Impression:  No radiographic evidence of acute cardiopulmonary disease.  Low lung volumes leads to magnification of the pulmonary interstitium      PHYSICAL EXAM:  GEN: No acute distress. Pt seen and examined at bedside.   HEENT: normocephalic, atraumatic  LUNGS: Clear to auscultation bilaterally, no crackles, no rhonchi, mild expiratory wheezes  HEART: S1/S2 present. irregularly irregular, no murmurs  ABD: Soft, non-tender, non-distended. Bowel sounds present  EXT: RLE AKA almost total thigh, LLE Foot/lower leg bandaged dry; pulse irregularly irregular  NEURO: AAOX3, normal affect      ASSESSMENT AND PLAN:  65 year old male with PMHx of HTN and left-sided AKA up to the hip, CKD 3a, recently admitted for RLE cellulitis and venous stasis ulcers, treated with cefepime and vanco, completed Amoxicillin and Doxy as an outpatient. presenting from [facility] with a chief complaint of RLE leg wound sent in by Dr. Sunshine, his vascular surgeon.     #RLE nonhealing wounds 2/2 chronic venous stasis  -no sepsis on admission, sent by Dr. Sunshine for further eval  -ESR/procalc/CRP elevated  -H/o of pseudomonas and staph aureus in RLE wound --> recent d/c for RLE cellulitis, was on vanc and cefepime and d/c on augmentin and doxycycline  -Burn evaluated patient in ED- was on schedule for washout 8/6 but cancelled; Washout canceled by burn today 8/9. No WCx taken.  -as per ID, start on Cefepime 2g q24h IV + flagyl 500mg q8h IV. Likely D/C on PO levaquin (renally dosed)/flagyl to complete 10 day course if no new culture data  -BCx:     -  -wound consulted placed    #MARLENE (3.0 on admission, baseline 0.9-1.0) on CKD3A, likely prerenal/wound infection  #metabolic acidosis   #hyponatremia (chronic)  -c/w LR @75cc/hr  -renal ultrasound normal  -trend Cr: 3.0 --> 2.7--> 2.1  -U/A: microproteinuria    -Discontinued sodium bicarb drip per neuro  -Started 1300mg sodium bicarb q8hr  -nephro eval: change IV fluids to d5 with 3 amp of bicarbonate at 75 cc/h if repeated bicarbonate < 15, check ABG     #Transaminitis  -AST: within normal limits  -ALT: elevated    #Chronic Macrocytic anemia, stable  -Hb drop from 13 to 10 on admission  -no signs of active bleeding  -Refused JAYASHREE, keep active t/s  -B12/folate normal  -iron studies    #PAD s/p Left AKA  -duplex 6/2021 showing normal RLE blood flow    #HTN  -on toprol, aldactone and lasix at home  -discontinued aldactone and lasix due to MARLENE; will discontinue on discharge    -c/w toprol      DVT ppx: heparin subq q8hr  GI ppx: not indicated  Diet: DASH    Dispo: discharge today/tomorrow to assisted living, Pt seen and examined at bedside. Pt is medically stable for discharge and PE is stable. Labs and vitals are stable. Pt for discharge today/tomorrow to assisted living.     SUBJECTIVE:    Patient is a 65y old Male who presents with a chief complaint of RLE wound (09 Aug 2021 16:53)    Currently admitted to medicine with the primary diagnosis of Nonhealing ulcer of right lower extremity    Today is hospital day 4d. This morning he is resting comfortably in bed and reports no new issues or overnight events.     PAST MEDICAL & SURGICAL HISTORY  Hypertension    GERD (gastroesophageal reflux disease)    Edema    Circulation disorder of lower extremity  left lower extremity    S/P BKA (below knee amputation), left    ALLERGIES:  No Known Allergies    MEDICATIONS:  STANDING MEDICATIONS  cefepime   IVPB 2000 milliGRAM(s) IV Intermittent daily  chlorhexidine 4% Liquid 1 Application(s) Topical <User Schedule>  collagenase Ointment 1 Application(s) Topical two times a day  Dakins Solution - Full Strength 1 Application(s) Topical two times a day  heparin   Injectable 5000 Unit(s) SubCutaneous every 8 hours  lactated ringers. 1000 milliLiter(s) IV Continuous <Continuous>  metoprolol succinate ER 25 milliGRAM(s) Oral daily  metroNIDAZOLE  IVPB 500 milliGRAM(s) IV Intermittent every 8 hours  sodium bicarbonate 1300 milliGRAM(s) Oral every 8 hours    PRN MEDICATIONS  acetaminophen   Tablet .. 650 milliGRAM(s) Oral every 6 hours PRN    VITALS:   T(F): 97.2  HR: 90  BP: 132/58  RR: 18  SpO2: 98%    LABS:  Negative for smoking/alcohol/drug use.                         8.5    4.49  )-----------( 86       ( 09 Aug 2021 04:30 )             25.6     08-09    140  |  112<H>  |  29<H>  ----------------------------<  99  3.6   |  19  |  2.1<H>    Ca    7.2<L>      09 Aug 2021 04:30  Phos  3.7     08-08  Mg     1.7     08-09    TPro  6.6  /  Alb  1.7<L>  /  TBili  0.9  /  DBili  x   /  AST  68<H>  /  ALT  29  /  AlkPhos  186<H>  08-09    PT/INR - ( 08 Aug 2021 20:51 )   PT: 17.00 sec;   INR: 1.48 ratio    PTT - ( 08 Aug 2021 20:51 )  PTT:35.5 sec      RADIOLOGY:  US Renal (08.06.21 @ 08:26)   IMPRESSION:  Normal renal ultrasound.    Xray Chest 1 View- PORTABLE-Routine (Xray Chest 1 View- PORTABLE-Routine .) (08.05.21 @ 11:42) >  Impression:  No radiographic evidence of acute cardiopulmonary disease.  Low lung volumes leads to magnification of the pulmonary interstitium      PHYSICAL EXAM:  GEN: No acute distress. Pt seen and examined at bedside.   HEENT: normocephalic, atraumatic  LUNGS: Clear to auscultation bilaterally, no crackles, no rhonchi, mild expiratory wheezes  HEART: S1/S2 present. irregularly irregular, no murmurs  ABD: Soft, non-tender, non-distended. Bowel sounds present  EXT: RLE AKA almost total thigh, LLE Foot/lower leg bandaged dry; pulse irregularly irregular  NEURO: AAOX3, normal affect      ASSESSMENT AND PLAN:  65 year old male with PMHx of HTN and left-sided AKA up to the hip, CKD 3a, recently admitted for RLE cellulitis and venous stasis ulcers, treated with cefepime and vanco, completed Amoxicillin and Doxy as an outpatient. presenting from [facility] with a chief complaint of RLE leg wound sent in by Dr. Sunshine, his vascular surgeon.     #RLE nonhealing wounds 2/2 chronic venous stasis  -no sepsis on admission, sent by Dr. Sunshine for further eval  -ESR/procalc/CRP elevated  -H/o of pseudomonas and staph aureus in RLE wound --> recent d/c for RLE cellulitis, was on vanc and cefepime and d/c on augmentin and doxycycline  -Burn evaluated patient in ED- was on schedule for washout 8/6 but cancelled; Washout canceled by burn today 8/9. No WCx taken.  -as per ID, start on Cefepime 2g q24h IV + flagyl 500mg q8h IV. Likely D/C on PO levaquin (renally dosed)/flagyl to complete 10 day course if no new culture data  -BCx (8/6): NGTD    -Per ID: discharge on levofloxacin renally dose and flagyl total of 10 days (started abx on 8/6)  -wound consulted placed    #MARLENE (3.0 on admission, baseline 0.9-1.0) on CKD3A, likely prerenal/wound infection  #metabolic acidosis   #hyponatremia (chronic)  -c/w LR @75cc/hr  -renal ultrasound normal  -trend Cr: 3.0 --> 2.7--> 2.1  -U/A: microproteinuria  -Cr clearance: 42    -Discontinued sodium bicarb drip per neuro  -Started 1300mg sodium bicarb q8hr  -nephro eval: change IV fluids to d5 with 3 amp of bicarbonate at 75 cc/h if repeated bicarbonate < 15, check ABG     #Transaminitis  -AST: within normal limits  -ALT: elevated    #Chronic Macrocytic anemia, stable  -Hb drop from 13 to 10 on admission  -no signs of active bleeding  -Refused JAYASHREE, keep active t/s  -B12/folate normal  -iron studies    #PAD s/p Left AKA  -duplex 6/2021 showing normal RLE blood flow    #HTN  -on toprol, aldactone and lasix at home  -discontinued aldactone and lasix due to MARLENE; will discontinue on discharge    -c/w toprol      DVT ppx: heparin subq q8hr  GI ppx: not indicated  Diet: DASH    Dispo: discharge today/tomorrow to assisted living, discharge on levofloxacin and flagyl

## 2021-08-09 NOTE — DISCHARGE NOTE PROVIDER - NSDCFUADDINST_GEN_ALL_CORE_FT
Please follow up with Burn clinic Dr. Miles within 1 week. Please follow up with vascular Dr. Sunshine within 1 week. Please follow up with nephrology Dr. Haywood within 1 week.  Please follow up with Burn clinic Dr. Miles within 1 week of discharge [phone] to make follow up appointment  Please follow up with vascular Dr. Sunshine within 1 week.   Please follow up with nephrology Dr. Haywood within 1 week.   Please take levofloxacin and flagyl as prescribed. Please follow up with Burn clinic Dr. Miles within 1 week of discharge [phone] to make follow up appointment.  -local wound care - Twice a day, wash with soap and water, WTD gauze to all wounds (moistened with NS) cover with xeroform, wrap with kerlix and ACE.   - RLE Elevation and compression       Please follow up with vascular Dr. Sunshine within 1 week.   Please follow up with nephrology Dr. Haywood within 1 week.   Please take levofloxacin and flagyl as prescribed. Please follow up with Burn clinic Dr. Miles within 1 week of discharge [phone] to make follow up appointment.  -local wound care - Twice a day, wash with soap and water, WTD gauze to all wounds (moistened with NS) cover with xeroform, wrap with kerlix and ACE.   - RLE Elevation and compression     Please follow up with nephrology within 1 week.     Please follow up with vascular Dr. Sunshine within 1 week.   Please take levofloxacin and flagyl as prescribed. Please follow up with Burn clinic Dr. Miles within 1 week of discharge [phone] to make follow up appointment.  -local wound care - Twice a day, wash with soap and water, WTD gauze to all wounds (moistened with NS) cover with xeroform, wrap with kerlix and ACE.   - RLE Elevation and compression     Please follow up with nephrology within 1 week.     Please follow up with vascular Dr. Sunshine within 1 week.   Please take levofloxacin and flagyl as prescribed.  Please take sodium bicarb 650mg every 8hrs x 7days. Please follow up with Burn clinic Dr. Miles within 1 week of discharge [phone] to make follow up appointment.  -local wound care - Twice a day, wash with soap and water, WTD gauze to all wounds (moistened with NS) cover with xeroform, wrap with kerlix and ACE.   - RLE Elevation and compression     Please follow up with nephrology within 1 week.     Please follow up with vascular Dr. Sunshine within 1 week.   Please take levofloxacin and flagyl as prescribed.  Please take sodium bicarb 650mg every 12hrs x 7days.

## 2021-08-09 NOTE — DISCHARGE NOTE PROVIDER - NSDCCPCAREPLAN_GEN_ALL_CORE_FT
PRINCIPAL DISCHARGE DIAGNOSIS  Diagnosis: Nonhealing ulcer of right lower extremity  Assessment and Plan of Treatment: You came to the hospital per Dr. Sunshine to have burn consulted (Dr. Hutchison) and to be admitted for treatment. You were started on antibiotics and infectious disease was consulted. Burn was consulted and were planning for a washout of your RLE ulcer today 8/9/21. Burn decided to not perform the washout and not to take cultures.   ID recommends continuing levofloxacin and flagyl as prescribed.      SECONDARY DISCHARGE DIAGNOSES  Diagnosis: MARLENE (acute kidney injury)  Assessment and Plan of Treatment: On admission your kidney functioning was decreased and indicated acute injury to the kidneys. Your creatinine was 3.0 on admission. Your lasix and aldactone was discontinued to prevent further kidney damage. Nephrology was consulted and were started on sodium bicarbonate drip with fluids. Nephrology was following you and your kidney functioning has been improving. Ultrasound of your kidneys were normal.   Please f/u with nephrology in 2 weeks. Please continue to take the sodium bicarbonate 1300mg every 8 hours. Do not continue to take lasix and aldactone at home.   Do no continue taking lasix and aldactone after discharge.     PRINCIPAL DISCHARGE DIAGNOSIS  Diagnosis: Nonhealing ulcer of right lower extremity  Assessment and Plan of Treatment: You came to the hospital per Dr. Sunshine to have burn consulted (Dr. Hutchison) and to be admitted for treatment. You were started on antibiotics and infectious disease was consulted. Burn was consulted and were planning for a washout of your RLE ulcer today 8/9/21. Burn decided to not perform the washout and not to take cultures.   Continue taking levofloxacin and flagyl as prescribed.  Burn clinic follow up within 1 week of discharge [phone] to make follow up appointment.      SECONDARY DISCHARGE DIAGNOSES  Diagnosis: MARLENE (acute kidney injury)  Assessment and Plan of Treatment: On admission your kidney functioning was decreased and indicated acute injury to the kidneys. Your creatinine was 3.0 on admission. Your lasix and aldactone was discontinued to prevent further kidney damage. Nephrology was consulted and were started on sodium bicarbonate drip with fluids. Nephrology was following you and your kidney functioning has been improving. Ultrasound of your kidneys were normal.   Please f/u with nephrology in 2 weeks. Please continue to take the sodium bicarbonate 1300mg every 8 hours. Do not continue to take lasix and aldactone at home.   Do no continue taking lasix and aldactone after discharge.     PRINCIPAL DISCHARGE DIAGNOSIS  Diagnosis: Nonhealing ulcer of right lower extremity  Assessment and Plan of Treatment: You came to the hospital per Dr. Sunshine to have burn consulted (Dr. Hutchison) and to be admitted for treatment. You were started on antibiotics and infectious disease was consulted. Burn was consulted and were planning for a washout of your RLE ulcer today 8/9/21. Burn decided to not perform the washout and not to take cultures.   Continue taking levofloxacin and flagyl as prescribed.  Burn clinic follow up within 1 week of discharge [phone] to make follow up appointment.      SECONDARY DISCHARGE DIAGNOSES  Diagnosis: MARLENE (acute kidney injury)  Assessment and Plan of Treatment: On admission your kidney functioning was decreased and indicated acute injury to the kidneys. Your creatinine was 3.0 on admission. Your lasix and aldactone was discontinued to prevent further kidney damage. Nephrology was consulted and were started on sodium bicarbonate drip with fluids. Nephrology was following you and your kidney functioning has been improving. Ultrasound of your kidneys were normal.   Please f/u with nephrology in 2 weeks. Please continue to take the sodium bicarbonate 650mg every 8 hours x7days. Do not continue to take lasix and aldactone at home.   Do no continue taking lasix and aldactone after discharge.     PRINCIPAL DISCHARGE DIAGNOSIS  Diagnosis: Nonhealing ulcer of right lower extremity  Assessment and Plan of Treatment: You came to the hospital per Dr. Sunshine to have burn consulted (Dr. Hutchison) and to be admitted for treatment. You were started on antibiotics and infectious disease was consulted. Burn was consulted and were planning for a washout of your RLE ulcer today 8/9/21. Burn decided to not perform the washout and not to take cultures.   Continue taking levofloxacin and flagyl as prescribed.  Burn clinic follow up within 1 week of discharge [phone] to make follow up appointment.      SECONDARY DISCHARGE DIAGNOSES  Diagnosis: MARLENE (acute kidney injury)  Assessment and Plan of Treatment: On admission your kidney functioning was decreased and indicated acute injury to the kidneys. Your creatinine was 3.0 on admission. Your lasix and aldactone was discontinued to prevent further kidney damage. Nephrology was consulted and were started on sodium bicarbonate drip with fluids. Nephrology was following you and your kidney functioning has been improving. Ultrasound of your kidneys were normal.   Please f/u with nephrology in 2 weeks. Please continue to take the sodium bicarbonate 650mg every 12hr x7days. Do not continue to take lasix and aldactone at home.   Do no continue taking lasix and aldactone after discharge.

## 2021-08-09 NOTE — PROGRESS NOTE ADULT - SUBJECTIVE AND OBJECTIVE BOX
[facility]  used     Burn follow up for RLE wounds at the request of vascular surgery. Possible OR today if wounds are worsened.       Pt: no complaints  No acute events o/n    Vital Signs Last 24 Hrs  T(C): 36.2 (09 Aug 2021 14:30), Max: 36.4 (08 Aug 2021 21:12)  T(F): 97.2 (09 Aug 2021 14:30), Max: 97.5 (08 Aug 2021 21:12)  HR: 90 (09 Aug 2021 14:30) (85 - 90)  BP: 132/58 (09 Aug 2021 14:30) (128/68 - 133/62)  BP(mean): 84 (09 Aug 2021 14:30) (84 - 84)  RR: 18 (09 Aug 2021 05:23) (18 - 18)  SpO2: 98% (09 Aug 2021 05:23) (98% - 98%)        08-09                          8.5    4.49  )-----------( 86       ( 09 Aug 2021 04:30 )             25.6         EXAM:   Gen: NAD  Neuro: Awake, alert  Wound RLE - lower leg and foot, full thickness wounds from foot to above ankle, wounds are pink, moist, and granulating well, no necrotic tissue, no pus, no bleeding, no erythema, no swelling.

## 2021-08-09 NOTE — DISCHARGE NOTE PROVIDER - CARE PROVIDERS DIRECT ADDRESSES
[email or web address]

## 2021-08-09 NOTE — PROGRESS NOTE ADULT - ASSESSMENT
The patient is a 65 year old male with PMHx of HTN and left-sided AKA up to the hip, presenting from [facility] with a chief complaint of RLE leg wound.   Healing.      RLE venose stasis wound:   - Wound has improved, no surgical debridement needed at this time  - can be dc with local wound care - Twice a day, wash with soap and water, WTD gauze to all wounds (moistened with NS) cover with xeroform, wrap with kerlix and ACE.   - f/u vascular  - RLE Elevation and compression   - burn clinic follow up within 1 week of discharge [phone] to make follow up appointment.

## 2021-08-09 NOTE — DISCHARGE NOTE PROVIDER - CARE PROVIDER_API CALL
Oliver Sunshine  Vascular Surgery  [address] [place] [address]  Phone: [phone]  Fax: [phone]  Follow Up Time: 1 week    Marta Miles)  Plastic Surgery  [address] [place] [address]  Phone: [phone]  Fax: [phone]  Follow Up Time: 1 week    Katherine Hernandez)  Internal Medicine; Nephrology  [address] [place] [address]  Phone: [phone]  Fax: [phone]  Follow Up Time: 1 week

## 2021-08-09 NOTE — DISCHARGE NOTE PROVIDER - HOSPITAL COURSE
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 The patient was admitted to medicine and discharged 7/25 after being found to have overlying cellulitis of venous stasis ulcer resulting in sepsis. After discharge he completed his oral antibiotics, but was sent in by Dr Sunshine (vascular) with instructions to consult burn (Dr Hutchison) and admission. Currently he has no other complaints; the wound is only painful with wound dressing changes; no recent fevers, chills, CP/SOB, AP/N/V/D, urinary symptoms, or any other complaints at this time    In the ED, he was seen by the burn team and they recommended local wound care and no surgical intervention. 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 The patient was admitted to medicine and discharged 7/25 after being found to have overlying cellulitis of venous stasis ulcer resulting in sepsis. After discharge he completed his oral antibiotics, but was sent in by Dr Sunshine (vascular) with instructions to consult burn (Dr Hutchison) and admission. Currently he has no other complaints; the wound is only painful with wound dressing changes; no recent fevers, chills, CP/SOB, AP/N/V/D, urinary symptoms, or any other complaints at this time    In the ED, he was seen by the burn team and they recommended local wound care and no surgical intervention. Labs are remarkable for Hb of 10.1 (from 13), , Na 134, BUN 38, and Cr 3.0  Attending Attestation:  Patient was seen & examined independently. At least 10 systems were reviewed in ROS. All systems reviewed  are within normal limits. Latest vital signs and labs were reviewed today. Case was discussed with house staff in morning rounds for assessment and plan.  Patient is medically stable for discharge . About 36 mins spent on discharge disposition.      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 The patient was admitted to medicine and discharged 7/25 after being found to have overlying cellulitis of venous stasis ulcer resulting in sepsis. After discharge he completed his oral antibiotics, but was sent in by Dr Sunshine (vascular) with instructions to consult burn (Dr Hutchison) and admission. Currently he has no other complaints; the wound is only painful with wound dressing changes; no recent fevers, chills, CP/SOB, AP/N/V/D, urinary symptoms, or any other complaints at this time    In the ED, he was seen by the burn team and they recommended local wound care and no surgical intervention. Labs are remarkable for Hb of 10.1 (from 13), , Na 134, BUN 38, and Cr 3.0.    Burn was consulted and planned for washout. Vascular    Pt had wound culture on 6/23 which grew pseudomonas, MSSA.   Pt was to have debridement/washout with wound cultures taken 9-Aug-2021, but per Burn they did not conducted washout/debridement and pt did not have wound cultures taken. Discussed with ID. Wound consult placed     Pt to be discharged on 1300mg sodium bicarb q8hr.   Per ID to be discharged on levofloxacin (renally dosed) and flagyl to complete 10 day course. Sent to specialty Rx pharmacy in [place].  Pt to continue home     Pt to be discharged to [facility] today 9-Aug-2021.    Attending Attestation:  Patient was seen & examined independently. At least 10 systems were reviewed in ROS. All systems reviewed  are within normal limits. Latest vital signs and labs were reviewed today. Case was discussed with house staff in morning rounds for assessment and plan.  Patient is medically stable for discharge. About 36 mins spent on discharge disposition.      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 The patient was admitted to medicine and discharged 7/25 after being found to have overlying cellulitis of venous stasis ulcer resulting in sepsis. After discharge he completed his oral antibiotics, but was sent in by Dr Sunshine (vascular) with instructions to consult burn (Dr Hutchison) and admission. Currently he has no other complaints; the wound is only painful with wound dressing changes; no recent fevers, chills, CP/SOB, AP/N/V/D, urinary symptoms, or any other complaints at this time    In the ED, he was seen by the burn team and they recommended local wound care and no surgical intervention. Labs are remarkable for Hb of 10.1 (from 13), , Na 134, BUN 38, and Cr 3.0.    Burn was consulted and planned for washout. Vascular surgery was consulted RLE venous statis ulcer and recommended wound management per Burn; no acute vascular intervention. Pt also noted to have MARLENE on admission, Cr downtrending, now 2.1, pt was on bicarb drip per nephro. Nephro recommends bicarb 1300mg q8hr to be continued outpatient and continuing LR. Lasix and aldactone was discontinued.     Pt had wound culture on 6/23 which grew pseudomonas, MSSA.   Pt was to have debridement/washout with wound cultures taken 9-Aug-2021, but per Burn they did not conducted washout/debridement and pt did not have wound cultures taken. Discussed with ID. Wound consult placed.    Pt to be discharged on 1300mg sodium bicarb q8hr.   Per ID to be discharged on levofloxacin (renally dosed) and flagyl to complete 10 day course. Sent to specialty Rx pharmacy in [place].  Pt to continue home medications; do not continue lasix and aldactone.     Pt to be discharged to [facility] today 9-Aug-2021.    Attending Attestation:  Patient was seen & examined independently. At least 10 systems were reviewed in ROS. All systems reviewed  are within normal limits. Latest vital signs and labs were reviewed today. Case was discussed with house staff in morning rounds for assessment and plan.  Patient is medically stable for discharge. About 36 mins spent on discharge disposition.      65 year old male with PMHx of HTN and left-sided AKA up to the hip, presenting from [facility] with a chief complaint of RLE leg wound. The patient was admitted to medicine and discharged 7/25 after being found to have overlying cellulitis of venous stasis ulcer resulting in sepsis. After discharge he completed his oral antibiotics, but was sent in by Dr Sunshine (vascular) with instructions to consult burn (Dr Hutchison) and admission. Currently he has no other complaints; the wound is only painful with wound dressing changes; no recent fevers, chills, CP/SOB, AP/N/V/D, urinary symptoms, or any other complaints at this time    In the ED, he was seen by the burn team and they recommended local wound care and no surgical intervention. Labs are remarkable for Hb of 10.1 (from 13), , Na 134, BUN 38, and Cr 3.0.    Burn was consulted and planned for washout. Vascular surgery was consulted RLE venous statis ulcer and recommended wound management per Burn; no acute vascular intervention. Pt also noted to have MARLENE on admission, Cr downtrending, now 2.1, pt was on bicarb drip per nephro. Nephro recommends bicarb 1300mg q8hr to be continued outpatient and continuing LR. Lasix and aldactone was discontinued.     Pt had wound culture on 6/23 which grew pseudomonas, MSSA.   Pt was to have debridement/washout with wound cultures taken 9-Aug-2021, but per Burn they did not conducted washout/debridement and pt did not have wound cultures taken. Discussed with ID. Wound consult placed.    Pt to be discharged on 1300mg sodium bicarb q8hr.   Per ID to be discharged on levofloxacin (renally dosed) and flagyl to complete 10 day course. Sent to specialty Rx pharmacy in [place].  Pt to continue home medications; do not continue lasix and aldactone.     Burn clinic follow up within 1 week of discharge [phone] to make follow up appointment. follow up with vascular Dr. Sunshine within 1 week.  follow up with nephrology Dr. Haywood within 1 week.     Pt to be discharged to [facility] today 10-Aug-2021. Pt's physical examination and labs remain stable. Pt medically cleared for discharge.    Attending Attestation:  Patient was seen & examined independently. At least 10 systems were reviewed in ROS. All systems reviewed  are within normal limits. Latest vital signs and labs were reviewed today. Case was discussed with house staff in morning rounds for assessment and plan.  Patient is medically stable for discharge. About 36 mins spent on discharge disposition.      65 year old male with PMHx of HTN and left-sided AKA up to the hip, presenting from [facility] with a chief complaint of RLE leg wound. The patient was admitted to medicine and discharged 7/25 after being found to have overlying cellulitis of venous stasis ulcer resulting in sepsis. After discharge he completed his oral antibiotics, but was sent in by Dr Sunshine (vascular) with instructions to consult burn (Dr Hutchison) and admission. Currently he has no other complaints; the wound is only painful with wound dressing changes; no recent fevers, chills, CP/SOB, AP/N/V/D, urinary symptoms, or any other complaints at this time    In the ED, he was seen by the burn team and they recommended local wound care and no surgical intervention. Labs are remarkable for Hb of 10.1 (from 13), , Na 134, BUN 38, and Cr 3.0.    Burn was consulted and planned for washout. Vascular surgery was consulted RLE venous statis ulcer and recommended wound management per Burn; no acute vascular intervention. Pt also noted to have MARLENE on admission, Cr downtrending, now 2.1, pt was on bicarb drip per nephro. Nephro recommends bicarb 1300mg q8hr to be continued outpatient and continuing LR. Lasix and aldactone was discontinued.     Pt had wound culture on 6/23 which grew pseudomonas, MSSA.   Pt was to have debridement/washout with wound cultures taken 9-Aug-2021, but per Burn they did not conducted washout/debridement and pt did not have wound cultures taken. Discussed with ID. Wound consult placed.    Pt to be discharged on 650mg sodium bicarb q8hr x 7days.  Per ID to be discharged on levofloxacin (renally dosed) and flagyl to complete 10 day course. Sent to specialty Rx pharmacy in [place].  Pt to continue home medications; do not continue lasix and aldactone.     Burn clinic follow up within 1 week of discharge [phone] to make follow up appointment. follow up with vascular Dr. Sunshine within 1 week.  follow up with nephrology Dr. Haywood within 1 week.     Pt to be discharged to [facility] today 10-Aug-2021. Pt's physical examination and labs remain stable. Pt medically cleared for discharge.    Attending Attestation:  Patient was seen & examined independently. At least 10 systems were reviewed in ROS. All systems reviewed  are within normal limits. Latest vital signs and labs were reviewed today. Case was discussed with house staff in morning rounds for assessment and plan.  Patient is medically stable for discharge. About 36 mins spent on discharge disposition.      65 year old male with PMHx of HTN and left-sided AKA up to the hip, presenting from [facility] with a chief complaint of RLE leg wound. The patient was admitted to medicine and discharged 7/25 after being found to have overlying cellulitis of venous stasis ulcer resulting in sepsis. After discharge he completed his oral antibiotics, but was sent in by Dr Sunshine (vascular) with instructions to consult burn (Dr Hutchison) and admission. Currently he has no other complaints; the wound is only painful with wound dressing changes; no recent fevers, chills, CP/SOB, AP/N/V/D, urinary symptoms, or any other complaints at this time    In the ED, he was seen by the burn team and they recommended local wound care and no surgical intervention. Labs are remarkable for Hb of 10.1 (from 13), , Na 134, BUN 38, and Cr 3.0.    Burn was consulted and planned for washout. Vascular surgery was consulted RLE venous statis ulcer and recommended wound management per Burn; no acute vascular intervention. Pt also noted to have MARLENE on admission, Cr downtrending, now 2.1, pt was on bicarb drip per nephro. Nephro recommends bicarb 1300mg q8hr to be continued outpatient and continuing LR. Lasix and aldactone was discontinued.     Pt had wound culture on 6/23 which grew pseudomonas, MSSA.   Pt was to have debridement/washout with wound cultures taken 9-Aug-2021, but per Burn they did not conducted washout/debridement and pt did not have wound cultures taken. Discussed with ID. Wound consult placed.    Pt to be discharged on 650mg sodium bicarb BID x 7days.  Per ID to be discharged on levofloxacin (renally dosed) and flagyl to complete 10 day course. Sent to specialty Rx pharmacy in [place].  Pt to continue home medications; do not continue lasix and aldactone.     Burn clinic follow up within 1 week of discharge [phone] to make follow up appointment. follow up with vascular Dr. Sunshine within 1 week.  follow up with nephrology Dr. Haywood within 1 week.     Pt to be discharged to [facility] today 10-Aug-2021. Pt's physical examination and labs remain stable. Pt medically cleared for discharge.    Attending Attestation:  Patient was seen & examined independently. At least 10 systems were reviewed in ROS. All systems reviewed  are within normal limits. Latest vital signs and labs were reviewed today. Case was discussed with house staff in morning rounds for assessment and plan.  Patient is medically stable for discharge. About 36 mins spent on discharge disposition.

## 2021-08-09 NOTE — DISCHARGE NOTE PROVIDER - NSDCFUSCHEDAPPT_GEN_ALL_CORE_FT
JUDY PEÑALOZA ; 08/17/2021 ; [facility] Burn [address] ; 09/02/2021 ; [facility] Surg V[facility] [address] JUDY PEÑALOZA ; 08/17/2021 ; [facility] Surg [address] [address] ; 08/17/2021 ; N[facility] Burn [address]

## 2021-08-09 NOTE — PROGRESS NOTE ADULT - ASSESSMENT
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 The patient was admitted to medicine and discharged 7/25 after being found to have overlying cellulitis of venous stasis ulcer resulting in sepsis. After discharge he completed his oral antibiotics, but was sent in by Dr Sunshine (vascular) with instructions to consult burn (Dr Hutchison) and admission. C      IMPRESSION  #RLE wounds, stasis ulcers with suspected suprainfection    Burn admission exam: Wounds: large full thickness wound to RLE, with the largest area if about 14 x5 cm to the lower medial leg with   serosanguinous drainage. There is a 6x4 cm would to lower lateral leg. 4x2 cm wound to dorsal foot. With areas of yellowish   thick exudate; Skin thickening and hyperpigmentation. No warmth, no pus drainage.    6/23 WCX pseudo (R aztreo , zosyn), MSSA  #Sepsis ruled out on admission   #Obesity BMI (kg/m2): 34.5  #MARLENE   Creatinine, Serum: 3.0 (08-05-21 @ 10:45)    Weight (kg): 100 (08-06-21 @ 07:28)    RECOMMENDATIONS  - Pending OR, f/u G/S & CX   - Cefepime 2g q24h IV + flagyl 500mg q8h IV  - Likely D/C on PO levaquin (renally dosed)/flagyl to complete 10 day course given recent cultures  - can f/u final CX as outpatient     If any questions, please call or send a message on [employer] Teams  Please continue to update ID with any pertinent new laboratory or radiographic findings  Spectra [id number]

## 2021-08-09 NOTE — PROGRESS NOTE ADULT - SUBJECTIVE AND OBJECTIVE BOX
seen and examined  no distress   c/o cough         PAST HISTORY  --------------------------------------------------------------------------------  No significant changes to PMH, PSH, FHx, SHx, unless otherwise noted    ALLERGIES & MEDICATIONS  --------------------------------------------------------------------------------  Allergies    No Known Allergies    Intolerances      Standing Inpatient Medications  cefepime   IVPB 2000 milliGRAM(s) IV Intermittent daily  chlorhexidine 4% Liquid 1 Application(s) Topical <User Schedule>  collagenase Ointment 1 Application(s) Topical two times a day  collagenase Ointment 1 Application(s) Topical two times a day  Dakins Solution - Full Strength 1 Application(s) Topical two times a day  heparin   Injectable 5000 Unit(s) SubCutaneous every 8 hours  magnesium oxide 400 milliGRAM(s) Oral three times a day with meals  metoprolol succinate ER 25 milliGRAM(s) Oral daily  metroNIDAZOLE  IVPB 500 milliGRAM(s) IV Intermittent every 8 hours  sodium bicarbonate  Infusion 0.113 mEq/kG/Hr IV Continuous <Continuous>    PRN Inpatient Medications  acetaminophen   Tablet .. 650 milliGRAM(s) Oral every 6 hours PRN          VITALS/PHYSICAL EXAM  --------------------------------------------------------------------------------  T(C): 36.4 (08-09-21 @ 05:23), Max: 36.4 (08-08-21 @ 21:12)  HR: 89 (08-09-21 @ 05:23) (85 - 89)  BP: 133/62 (08-09-21 @ 05:23) (125/65 - 133/62)  RR: 18 (08-09-21 @ 05:23) (18 - 18)  SpO2: 98% (08-09-21 @ 05:23) (98% - 98%)  Wt(kg): --  Height (cm): 170.2 (08-07-21 @ 10:26)  Weight (kg): 100 (08-07-21 @ 10:26)  BMI (kg/m2): 34.5 (08-07-21 @ 10:26)  BSA (m2): 2.11 (08-07-21 @ 10:26)      08-08-21 @ 07:01  -  08-09-21 @ 07:00  --------------------------------------------------------  IN: 0 mL / OUT: 950 mL / NET: -950 mL      Physical Exam:  	Gen: NAD,   	Pulm:  B/L ronchi at bases   	CV:  S1S2; no rub  	Abd: +distended      LABS/STUDIES  --------------------------------------------------------------------------------              8.0    4.32  >-----------<  91       [08-08-21 @ 20:51]              21.7     139  |  113  |  30  ----------------------------<  101      [08-08-21 @ 20:51]  3.9   |  19  |  2.1        Ca     7.3     [08-08-21 @ 20:51]      Mg     1.7     [08-08-21 @ 20:51]      Phos  3.7     [08-08-21 @ 20:51]    TPro  6.5  /  Alb  1.8  /  TBili  1.0  /  DBili  x   /  AST  66  /  ALT  29  /  AlkPhos  173  [08-08-21 @ 20:51]    PT/INR: PT 17.00, INR 1.48       [08-08-21 @ 20:51]  PTT: 35.5       [08-08-21 @ 20:51]      Creatinine Trend:  SCr 2.1 [08-08 @ 20:51]  SCr 2.3 [08-08 @ 06:40]  SCr 2.7 [08-07 @ 06:41]  SCr 2.8 [08-06 @ 21:37]  SCr 2.7 [08-06 @ 07:48]    Urinalysis - [08-06-21 @ 15:52]      Color Yellow / Appearance Clear / SG 1.013 / pH 6.5      Gluc Negative / Ketone Negative  / Bili Negative / Urobili 3 mg/dL       Blood Large / Protein 30 mg/dL / Leuk Est Negative / Nitrite Negative       / WBC 8 / Hyaline 1 / Gran  / Sq Epi  / Non Sq Epi 1 / Bacteria Negative    Urine Creatinine 67      [08-06-21 @ 15:52]  Urine Protein 49      [08-06-21 @ 15:52]  Urine Sodium 60.0      [08-06-21 @ 15:52]  Urine Urea Nitrogen 471      [08-06-21 @ 15:52]  Urine Potassium 34      [08-06-21 @ 15:52]  Urine Chloride 45      [08-05-21 @ 15:52]  Urine Osmolality 366      [08-05-21 @ 15:52]    Iron 64, TIBC 118, %sat 54      [08-06-21 @ 07:48]  Ferritin 825      [08-07-21 @ 06:41]  TSH 0.48      [06-23-21 @ 07:01]  Lipid: chol 107, TG 67, HDL 40, LDL --      [08-07-21 @ 06:41]

## 2021-08-09 NOTE — PROGRESS NOTE ADULT - ASSESSMENT
# MARLENE on CKD 3A prerenal most likely/ wound infection   - non oliguric  - creatinine stable   - please document strict i/o   - add sodium bicarb 1300mg q8h   - renal ultrasound noted w/o hydronephrosis   - phos level noted, does not need binder  - appreciate ID eval, cont cefepime/flagyl, appreciate burn f/u for today , f/u cultures  will sign off recall as needed

## 2021-08-10 ENCOUNTER — TRANSCRIPTION ENCOUNTER (OUTPATIENT)
Age: 66
End: 2021-08-10

## 2021-08-10 VITALS
DIASTOLIC BLOOD PRESSURE: 61 MMHG | SYSTOLIC BLOOD PRESSURE: 142 MMHG | RESPIRATION RATE: 18 BRPM | TEMPERATURE: 98 F | HEART RATE: 93 BPM

## 2021-08-10 LAB
ALBUMIN SERPL ELPH-MCNC: 1.6 G/DL — LOW (ref 3.5–5.2)
ALP SERPL-CCNC: 164 U/L — HIGH (ref 30–115)
ALT FLD-CCNC: 29 U/L — SIGNIFICANT CHANGE UP (ref 0–41)
ANION GAP SERPL CALC-SCNC: 7 MMOL/L — SIGNIFICANT CHANGE UP (ref 7–14)
AST SERPL-CCNC: 70 U/L — HIGH (ref 0–41)
BASOPHILS # BLD AUTO: 0.02 K/UL — SIGNIFICANT CHANGE UP (ref 0–0.2)
BASOPHILS NFR BLD AUTO: 0.4 % — SIGNIFICANT CHANGE UP (ref 0–1)
BILIRUB SERPL-MCNC: 1.2 MG/DL — SIGNIFICANT CHANGE UP (ref 0.2–1.2)
BUN SERPL-MCNC: 27 MG/DL — HIGH (ref 10–20)
CALCIUM SERPL-MCNC: 7.3 MG/DL — LOW (ref 8.5–10.1)
CHLORIDE SERPL-SCNC: 110 MMOL/L — SIGNIFICANT CHANGE UP (ref 98–110)
CO2 SERPL-SCNC: 22 MMOL/L — SIGNIFICANT CHANGE UP (ref 17–32)
CREAT SERPL-MCNC: 1.9 MG/DL — HIGH (ref 0.7–1.5)
EOSINOPHIL # BLD AUTO: 0.23 K/UL — SIGNIFICANT CHANGE UP (ref 0–0.7)
EOSINOPHIL NFR BLD AUTO: 4.9 % — SIGNIFICANT CHANGE UP (ref 0–8)
GLUCOSE SERPL-MCNC: 67 MG/DL — LOW (ref 70–99)
HCT VFR BLD CALC: 25.6 % — LOW (ref 42–52)
HGB BLD-MCNC: 8.5 G/DL — LOW (ref 14–18)
IMM GRANULOCYTES NFR BLD AUTO: 0.6 % — HIGH (ref 0.1–0.3)
LYMPHOCYTES # BLD AUTO: 1.13 K/UL — LOW (ref 1.2–3.4)
LYMPHOCYTES # BLD AUTO: 24.1 % — SIGNIFICANT CHANGE UP (ref 20.5–51.1)
MAGNESIUM SERPL-MCNC: 1.9 MG/DL — SIGNIFICANT CHANGE UP (ref 1.8–2.4)
MCHC RBC-ENTMCNC: 33.2 G/DL — SIGNIFICANT CHANGE UP (ref 32–37)
MCHC RBC-ENTMCNC: 34.7 PG — HIGH (ref 27–31)
MCV RBC AUTO: 104.5 FL — HIGH (ref 80–94)
MONOCYTES # BLD AUTO: 0.51 K/UL — SIGNIFICANT CHANGE UP (ref 0.1–0.6)
MONOCYTES NFR BLD AUTO: 10.9 % — HIGH (ref 1.7–9.3)
NEUTROPHILS # BLD AUTO: 2.77 K/UL — SIGNIFICANT CHANGE UP (ref 1.4–6.5)
NEUTROPHILS NFR BLD AUTO: 59.1 % — SIGNIFICANT CHANGE UP (ref 42.2–75.2)
NRBC # BLD: 0 /100 WBCS — SIGNIFICANT CHANGE UP (ref 0–0)
PLATELET # BLD AUTO: 85 K/UL — LOW (ref 130–400)
POTASSIUM SERPL-MCNC: 3.9 MMOL/L — SIGNIFICANT CHANGE UP (ref 3.5–5)
POTASSIUM SERPL-SCNC: 3.9 MMOL/L — SIGNIFICANT CHANGE UP (ref 3.5–5)
PROT SERPL-MCNC: 6.6 G/DL — SIGNIFICANT CHANGE UP (ref 6–8)
RBC # BLD: 2.45 M/UL — LOW (ref 4.7–6.1)
RBC # FLD: 13.7 % — SIGNIFICANT CHANGE UP (ref 11.5–14.5)
SODIUM SERPL-SCNC: 139 MMOL/L — SIGNIFICANT CHANGE UP (ref 135–146)
WBC # BLD: 4.69 K/UL — LOW (ref 4.8–10.8)
WBC # FLD AUTO: 4.69 K/UL — LOW (ref 4.8–10.8)

## 2021-08-10 PROCEDURE — 99239 HOSP IP/OBS DSCHRG MGMT >30: CPT

## 2021-08-10 RX ORDER — CIPROFLOXACIN LACTATE 400MG/40ML
1 VIAL (ML) INTRAVENOUS
Qty: 6 | Refills: 0
Start: 2021-08-10 | End: 2021-08-15

## 2021-08-10 RX ORDER — METRONIDAZOLE 500 MG
1 TABLET ORAL
Qty: 18 | Refills: 0
Start: 2021-08-10 | End: 2021-08-15

## 2021-08-10 RX ORDER — SODIUM BICARBONATE 1 MEQ/ML
1 SYRINGE (ML) INTRAVENOUS
Qty: 21 | Refills: 0
Start: 2021-08-10 | End: 2021-08-16

## 2021-08-10 RX ADMIN — Medication 1 APPLICATION(S): at 05:38

## 2021-08-10 RX ADMIN — Medication 1300 MILLIGRAM(S): at 11:51

## 2021-08-10 RX ADMIN — HEPARIN SODIUM 5000 UNIT(S): 5000 INJECTION INTRAVENOUS; SUBCUTANEOUS at 11:51

## 2021-08-10 RX ADMIN — Medication 1300 MILLIGRAM(S): at 05:39

## 2021-08-10 RX ADMIN — Medication 25 MILLIGRAM(S): at 05:38

## 2021-08-10 RX ADMIN — Medication 100 MILLIGRAM(S): at 05:38

## 2021-08-10 RX ADMIN — CEFEPIME 100 MILLIGRAM(S): 1 INJECTION, POWDER, FOR SOLUTION INTRAMUSCULAR; INTRAVENOUS at 11:51

## 2021-08-10 RX ADMIN — HEPARIN SODIUM 5000 UNIT(S): 5000 INJECTION INTRAVENOUS; SUBCUTANEOUS at 05:38

## 2021-08-10 RX ADMIN — CHLORHEXIDINE GLUCONATE 1 APPLICATION(S): 213 SOLUTION TOPICAL at 05:38

## 2021-08-10 RX ADMIN — Medication 100 MILLIGRAM(S): at 11:51

## 2021-08-10 NOTE — PROGRESS NOTE ADULT - SUBJECTIVE AND OBJECTIVE BOX
SUBJECTIVE:  Patient is a 65y old Male who presents with a chief complaint of RLE wound (09 Aug 2021 16:53)    Currently admitted to medicine with the primary diagnosis of Nonhealing ulcer of right lower extremity    Today is hospital day 5d. This morning he is resting comfortably in bed and reports no new issues or overnight events.     PAST MEDICAL & SURGICAL HISTORY  Hypertension    GERD (gastroesophageal reflux disease)    Edema    Circulation disorder of lower extremity  left lower extremity    S/P BKA (below knee amputation), left    ALLERGIES:  No Known Allergies    MEDICATIONS:  STANDING MEDICATIONS  cefepime   IVPB 2000 milliGRAM(s) IV Intermittent daily  chlorhexidine 4% Liquid 1 Application(s) Topical <User Schedule>  collagenase Ointment 1 Application(s) Topical two times a day  Dakins Solution - Full Strength 1 Application(s) Topical two times a day  heparin   Injectable 5000 Unit(s) SubCutaneous every 8 hours  lactated ringers. 1000 milliLiter(s) IV Continuous <Continuous>  metoprolol succinate ER 25 milliGRAM(s) Oral daily  metroNIDAZOLE  IVPB 500 milliGRAM(s) IV Intermittent every 8 hours  sodium bicarbonate 1300 milliGRAM(s) Oral every 8 hours    PRN MEDICATIONS  acetaminophen   Tablet .. 650 milliGRAM(s) Oral every 6 hours PRN    VITALS:   T(F): 97.2  HR: 90  BP: 132/58  RR: 18  SpO2: 98%    LABS:  Negative for smoking/alcohol/drug use.                         8.5    4.49  )-----------( 86       ( 09 Aug 2021 04:30 )             25.6     08-09    140  |  112<H>  |  29<H>  ----------------------------<  99  3.6   |  19  |  2.1<H>    Ca    7.2<L>      09 Aug 2021 04:30  Phos  3.7     08-08  Mg     1.7     08-09    TPro  6.6  /  Alb  1.7<L>  /  TBili  0.9  /  DBili  x   /  AST  68<H>  /  ALT  29  /  AlkPhos  186<H>  08-09    PT/INR - ( 08 Aug 2021 20:51 )   PT: 17.00 sec;   INR: 1.48 ratio    PTT - ( 08 Aug 2021 20:51 )  PTT:35.5 sec    RADIOLOGY:  US Renal (08.06.21 @ 08:26)   IMPRESSION:  Normal renal ultrasound.    Xray Chest 1 View- PORTABLE-Routine (Xray Chest 1 View- PORTABLE-Routine .) (08.05.21 @ 11:42) >  Impression:  No radiographic evidence of acute cardiopulmonary disease.  Low lung volumes leads to magnification of the pulmonary interstitium    PHYSICAL EXAM:  GEN: No acute distress. Pt seen and examined at bedside.   HEENT: normocephalic, atraumatic  LUNGS: Clear to auscultation bilaterally, no crackles, no rhonchi, mild expiratory wheezes  HEART: S1/S2 present. No murmurs  ABD: Soft, non-tender, non-distended. Bowel sounds present  EXT: RLE AKA almost total thigh amputation site no erythema, LLE Foot/lower leg bandaged dry  NEURO: AAOX3, normal affect    ASSESSMENT AND PLAN:  65 year old male with PMHx of HTN and left-sided AKA up to the hip, CKD 3a, recently admitted for RLE cellulitis and venous stasis ulcers, treated with cefepime and vanco, completed Amoxicillin and Doxy as an outpatient. presenting from [facility] with a chief complaint of RLE leg wound sent in by Dr. Sunshine, his vascular surgeon.     #RLE nonhealing wounds 2/2 chronic venous stasis  -no sepsis on admission, sent by Dr. Sunshine for further eval  -ESR/procalc/CRP elevated  -H/o of pseudomonas and staph aureus in RLE wound --> recent d/c for RLE cellulitis, was on vanc and cefepime and d/c on augmentin and doxycycline  -Burn evaluated patient in ED- was on schedule for washout 8/6 but cancelled; Washout canceled by burn today 8/9. No WCx taken.  -as per ID, start on Cefepime 2g q24h IV + flagyl 500mg q8h IV. Likely D/C on PO levaquin (renally dosed)/flagyl to complete 10 day course if no new culture data  -BCx (8/6): NGTD    -Per ID: discharge on levofloxacin renally dose and flagyl total of 10 days (started abx on 8/6)  -wound consulted placed    #MARLENE (3.0 on admission, baseline 0.9-1.0) on CKD3A, likely prerenal/wound infection; resolving  #metabolic acidosis   #hyponatremia (chronic)  -renal ultrasound normal  -trend Cr: 3.0> 2.7> 2.1> 1.9  -U/A: microproteinuria    -Fluids discontinued  -[id number] sodium bicarb q8hr until discharge  -Discharge patient on sodium bicarb 650mg q8hr x 7 days    #Transaminitis  -AST: within normal limits  -ALT: elevated    #Chronic Macrocytic anemia, stable  -Hb drop from 13 to 10 on admission  -no signs of active bleeding  -Refused JAYASHREE, keep active t/s  -B12/folate normal  -iron studies    #PAD s/p Left AKA  -duplex 6/2021 showing normal RLE blood flow    #HTN  -on toprol, aldactone and lasix at home  -discontinued aldactone and lasix due to MARLENE; will discontinue on discharge    -c/w toprol      DVT ppx: heparin subq q8hr  GI ppx: not indicated  Diet: DASH    Dispo: discharge tomorrow to assisted living, discharge on levofloxacin and flagyl.  Pt seen and examined at bedside. Pt is medically stable for discharge and PE is stable. Labs and vitals are stable. Pt for discharge today/tomorrow to assisted living.      SUBJECTIVE:  Patient is a 65y old Male who presents with a chief complaint of RLE wound (09 Aug 2021 16:53)    Currently admitted to medicine with the primary diagnosis of Nonhealing ulcer of right lower extremity    Today is hospital day 5d. This morning he is resting comfortably in bed and reports no new issues or overnight events.     PAST MEDICAL & SURGICAL HISTORY  Hypertension    GERD (gastroesophageal reflux disease)    Edema    Circulation disorder of lower extremity  left lower extremity    S/P BKA (below knee amputation), left    ALLERGIES:  No Known Allergies    MEDICATIONS:  STANDING MEDICATIONS  cefepime   IVPB 2000 milliGRAM(s) IV Intermittent daily  chlorhexidine 4% Liquid 1 Application(s) Topical <User Schedule>  collagenase Ointment 1 Application(s) Topical two times a day  Dakins Solution - Full Strength 1 Application(s) Topical two times a day  heparin   Injectable 5000 Unit(s) SubCutaneous every 8 hours  lactated ringers. 1000 milliLiter(s) IV Continuous <Continuous>  metoprolol succinate ER 25 milliGRAM(s) Oral daily  metroNIDAZOLE  IVPB 500 milliGRAM(s) IV Intermittent every 8 hours  sodium bicarbonate 1300 milliGRAM(s) Oral every 8 hours    PRN MEDICATIONS  acetaminophen   Tablet .. 650 milliGRAM(s) Oral every 6 hours PRN    VITALS:   T(F): 97.2  HR: 90  BP: 132/58  RR: 18  SpO2: 98%    LABS:  Negative for smoking/alcohol/drug use.                         8.5    4.49  )-----------( 86       ( 09 Aug 2021 04:30 )             25.6     08-09    140  |  112<H>  |  29<H>  ----------------------------<  99  3.6   |  19  |  2.1<H>    Ca    7.2<L>      09 Aug 2021 04:30  Phos  3.7     08-08  Mg     1.7     08-09    TPro  6.6  /  Alb  1.7<L>  /  TBili  0.9  /  DBili  x   /  AST  68<H>  /  ALT  29  /  AlkPhos  186<H>  08-09    PT/INR - ( 08 Aug 2021 20:51 )   PT: 17.00 sec;   INR: 1.48 ratio    PTT - ( 08 Aug 2021 20:51 )  PTT:35.5 sec    RADIOLOGY:  US Renal (08.06.21 @ 08:26)   IMPRESSION:  Normal renal ultrasound.    Xray Chest 1 View- PORTABLE-Routine (Xray Chest 1 View- PORTABLE-Routine .) (08.05.21 @ 11:42) >  Impression:  No radiographic evidence of acute cardiopulmonary disease.  Low lung volumes leads to magnification of the pulmonary interstitium    PHYSICAL EXAM:  GEN: No acute distress. Pt seen and examined at bedside.   HEENT: normocephalic, atraumatic  LUNGS: Clear to auscultation bilaterally, no crackles, no rhonchi, mild expiratory wheezes  HEART: S1/S2 present. No murmurs  ABD: Soft, non-tender, non-distended. Bowel sounds present  EXT: RLE AKA almost total thigh amputation site no erythema, LLE Foot/lower leg bandaged dry  NEURO: AAOX3, normal affect    ASSESSMENT AND PLAN:  65 year old male with PMHx of HTN and left-sided AKA up to the hip, CKD 3a, recently admitted for RLE cellulitis and venous stasis ulcers, treated with cefepime and vanco, completed Amoxicillin and Doxy as an outpatient. presenting from [facility] with a chief complaint of RLE leg wound sent in by Dr. Sunshine, his vascular surgeon.     #RLE nonhealing wounds 2/2 chronic venous stasis  -no sepsis on admission, sent by Dr. Sunshine for further eval  -ESR/procalc/CRP elevated  -H/o of pseudomonas and staph aureus in RLE wound --> recent d/c for RLE cellulitis, was on vanc and cefepime and d/c on augmentin and doxycycline  -Burn evaluated patient in ED- was on schedule for washout 8/6 but cancelled; Washout canceled by burn today 8/9. No WCx taken.  -as per ID, start on Cefepime 2g q24h IV + flagyl 500mg q8h IV. Likely D/C on PO levaquin (renally dosed)/flagyl to complete 10 day course if no new culture data  -BCx (8/6): NGTD    -Per ID: discharge on levofloxacin renally dose and flagyl total of 10 days (started abx on 8/6)  -wound consulted placed    #MARLENE (3.0 on admission, baseline 0.9-1.0) on CKD3A, likely prerenal/wound infection; resolving  #metabolic acidosis   #hyponatremia (chronic)  -renal ultrasound normal  -trend Cr: 3.0> 2.7> 2.1> 1.9  -U/A: microproteinuria    -Fluids discontinued  -[id number] sodium bicarb q8hr until discharge  -Discharge patient on sodium bicarb 650mg BID x 7 days    #Transaminitis  -AST: within normal limits  -ALT: elevated    #Chronic Macrocytic anemia, stable  -Hb drop from 13 to 10 on admission  -no signs of active bleeding  -Refused JAYASHREE, keep active t/s  -B12/folate normal  -iron studies    #PAD s/p Left AKA  -duplex 6/2021 showing normal RLE blood flow    #HTN  -on toprol, aldactone and lasix at home  -discontinued aldactone and lasix due to MARLENE; will discontinue on discharge    -c/w toprol      DVT ppx: heparin subq q8hr  GI ppx: not indicated  Diet: DASH    Dispo: discharge tomorrow to assisted living, discharge on levofloxacin and flagyl.    Pt seen and examined at bedside. Pt is medically stable for discharge and PE is stable. Labs and vitals are stable. Pt for discharge today/tomorrow to assisted living.

## 2021-08-10 NOTE — CHART NOTE - NSCHARTNOTEFT_GEN_A_CORE
Patient was seen & examined independently on bedside.   Latest vital signs, labs, imaging studies were reviewed today.  Consults reviewed.  Case was discussed with house staff in morning rounds for assessment and plan.   Medically stable for discharge . Progress note added as update  to discharge summary .

## 2021-08-10 NOTE — DISCHARGE NOTE NURSING/CASE MANAGEMENT/SOCIAL WORK - PATIENT PORTAL LINK FT
You can access the FollowMyHealth Patient Portal offered by [facility] by registering at the following website: [email or web address]. By joining [employer]’s FollowMyHealth portal, you will also be able to view your health information using other applications (apps) compatible with our system.

## 2021-08-10 NOTE — PROGRESS NOTE ADULT - PROVIDER SPECIALTY LIST ADULT
Burn
Internal Medicine
Infectious Disease
Internal Medicine
Internal Medicine
Nephrology
Hospitalist
Hospitalist

## 2021-08-11 LAB
CULTURE RESULTS: SIGNIFICANT CHANGE UP
SPECIMEN SOURCE: SIGNIFICANT CHANGE UP

## 2021-08-17 ENCOUNTER — APPOINTMENT (OUTPATIENT)
Dept: VASCULAR SURGERY | Facility: CLINIC | Age: 66
End: 2021-08-17

## 2021-08-17 DIAGNOSIS — I73.9 PERIPHERAL VASCULAR DISEASE, UNSPECIFIED: ICD-10-CM

## 2021-08-17 DIAGNOSIS — L03.115 CELLULITIS OF RIGHT LOWER LIMB: ICD-10-CM

## 2021-08-17 DIAGNOSIS — K21.9 GASTRO-ESOPHAGEAL REFLUX DISEASE WITHOUT ESOPHAGITIS: ICD-10-CM

## 2021-08-17 DIAGNOSIS — L97.919 NON-PRESSURE CHRONIC ULCER OF UNSPECIFIED PART OF RIGHT LOWER LEG WITH UNSPECIFIED SEVERITY: ICD-10-CM

## 2021-08-17 DIAGNOSIS — E87.2 ACIDOSIS: ICD-10-CM

## 2021-08-17 DIAGNOSIS — I87.2 VENOUS INSUFFICIENCY (CHRONIC) (PERIPHERAL): ICD-10-CM

## 2021-08-17 DIAGNOSIS — E66.9 OBESITY, UNSPECIFIED: ICD-10-CM

## 2021-08-17 DIAGNOSIS — I12.9 HYPERTENSIVE CHRONIC KIDNEY DISEASE WITH STAGE 1 THROUGH STAGE 4 CHRONIC KIDNEY DISEASE, OR UNSPECIFIED CHRONIC KIDNEY DISEASE: ICD-10-CM

## 2021-08-17 DIAGNOSIS — E87.8 OTHER DISORDERS OF ELECTROLYTE AND FLUID BALANCE, NOT ELSEWHERE CLASSIFIED: ICD-10-CM

## 2021-08-17 DIAGNOSIS — E87.6 HYPOKALEMIA: ICD-10-CM

## 2021-08-17 DIAGNOSIS — D64.9 ANEMIA, UNSPECIFIED: ICD-10-CM

## 2021-08-17 DIAGNOSIS — E87.1 HYPO-OSMOLALITY AND HYPONATREMIA: ICD-10-CM

## 2021-08-17 DIAGNOSIS — Z89.612 ACQUIRED ABSENCE OF LEFT LEG ABOVE KNEE: ICD-10-CM

## 2021-08-17 DIAGNOSIS — N17.9 ACUTE KIDNEY FAILURE, UNSPECIFIED: ICD-10-CM

## 2021-08-17 DIAGNOSIS — N18.31 CHRONIC KIDNEY DISEASE, STAGE 3A: ICD-10-CM

## 2021-08-24 ENCOUNTER — OUTPATIENT (OUTPATIENT)
Dept: OUTPATIENT SERVICES | Facility: HOSPITAL | Age: 66
LOS: 1 days | Discharge: HOME | End: 2021-08-24

## 2021-08-24 ENCOUNTER — APPOINTMENT (OUTPATIENT)
Dept: BURN CARE | Facility: CLINIC | Age: 66
End: 2021-08-24
Payer: MEDICARE

## 2021-08-24 DIAGNOSIS — Z89.512 ACQUIRED ABSENCE OF LEFT LEG BELOW KNEE: Chronic | ICD-10-CM

## 2021-08-24 PROCEDURE — 99212 OFFICE O/P EST SF 10 MIN: CPT

## 2021-08-24 RX ORDER — AMOXICILLIN AND CLAVULANATE POTASSIUM 875; 125 MG/1; MG/1
875-125 TABLET, COATED ORAL
Qty: 14 | Refills: 0 | Status: ACTIVE | OUTPATIENT
Start: 2021-08-24

## 2021-08-24 RX ORDER — CIPROFLOXACIN HYDROCHLORIDE 500 MG/1
500 TABLET, FILM COATED ORAL
Qty: 14 | Refills: 0 | Status: ACTIVE | OUTPATIENT
Start: 2021-08-24

## 2021-08-27 LAB — BACTERIA SPEC CULT: ABNORMAL

## 2021-08-31 ENCOUNTER — OUTPATIENT (OUTPATIENT)
Dept: OUTPATIENT SERVICES | Facility: HOSPITAL | Age: 66
LOS: 1 days | Discharge: HOME | End: 2021-08-31

## 2021-08-31 ENCOUNTER — APPOINTMENT (OUTPATIENT)
Dept: BURN CARE | Facility: CLINIC | Age: 66
End: 2021-08-31
Payer: MEDICARE

## 2021-08-31 DIAGNOSIS — Z89.512 ACQUIRED ABSENCE OF LEFT LEG BELOW KNEE: Chronic | ICD-10-CM

## 2021-08-31 PROCEDURE — 99213 OFFICE O/P EST LOW 20 MIN: CPT

## 2021-08-31 RX ORDER — AMOXICILLIN AND CLAVULANATE POTASSIUM 875; 125 MG/1; MG/1
875-125 TABLET, COATED ORAL
Qty: 14 | Refills: 0 | Status: ACTIVE | COMMUNITY
Start: 2021-08-31 | End: 1900-01-01

## 2021-09-08 DIAGNOSIS — L97.919 NON-PRESSURE CHRONIC ULCER OF UNSPECIFIED PART OF RIGHT LOWER LEG WITH UNSPECIFIED SEVERITY: ICD-10-CM

## 2021-09-08 DIAGNOSIS — M79.89 OTHER SPECIFIED SOFT TISSUE DISORDERS: ICD-10-CM

## 2021-09-08 DIAGNOSIS — I83.019 VARICOSE VEINS OF RIGHT LOWER EXTREMITY WITH ULCER OF UNSPECIFIED SITE: ICD-10-CM

## 2021-09-08 DIAGNOSIS — L03.115 CELLULITIS OF RIGHT LOWER LIMB: ICD-10-CM

## 2021-09-28 ENCOUNTER — APPOINTMENT (OUTPATIENT)
Dept: BURN CARE | Facility: CLINIC | Age: 66
End: 2021-09-28

## 2022-09-02 NOTE — PATIENT PROFILE ADULT - FUNCTIONAL SCREEN CURRENT LEVEL: SWALLOWING (IF SCORE 2 OR MORE FOR ANY ITEM, CONSULT REHAB SERVICES), MLM)
Preoperative Consultation      Sterling Garcia  YOB: 1953    Date of Service:  9/2/2022    Vitals:    09/02/22 0938   BP: 136/76   Pulse: 88   Resp: 20   Temp: 97.8 °F (36.6 °C)   TempSrc: Temporal   SpO2: 98%   Weight: 248 lb (112.5 kg)      Wt Readings from Last 2 Encounters:   09/02/22 248 lb (112.5 kg)   08/21/22 241 lb (109.3 kg)     BP Readings from Last 3 Encounters:   09/02/22 136/76   08/21/22 (!) 159/85   08/10/22 120/70        Chief Complaint   Patient presents with    Pre-op Exam     Surgery sched 9/11/2022  Urology  (pt states its going to be in [place])     Allergies   Allergen Reactions    Indomethacin Other (See Comments)     Dr. Aba Gallegos told him not to take because affects the bladder. Statins Other (See Comments)    Succinylcholine      ? ? Succinylcholine \"allergy\"  Pt. Should be worked  Up for pseudocholinesterase deficiency. Pt. States he qwas told in the past that he had trouble with an anesthesia drug that begins with an \"s\" and that it took him awhile to breathe in PACU, but could provide no further details. Unable To Assess       Anesthesia that begins with S- stops my breathing- daughter reports succinylcholine   Questionable pseudocholinesterase deficiency    Actos [Pioglitazone] Rash     Outpatient Medications Marked as Taking for the 9/2/22 encounter (Office Visit) with Teressa Shepherd MD   Medication Sig Dispense Refill    pregabalin (LYRICA) 50 MG capsule Take 50 mg by mouth 3 times daily.  2 capsules AM   2 capsules PM      glimepiride (AMARYL) 4 MG tablet TAKE 1 TABLET BY MOUTH EVERY MORNING BEFORE BREAKFAST 90 tablet 0    NIFEdipine (ADALAT CC) 30 MG extended release tablet TAKE 1 TABLET BY MOUTH DAILY 90 tablet 1    amLODIPine (NORVASC) 10 MG tablet Take 10 mg by mouth daily      metoprolol succinate (TOPROL XL) 100 MG extended release tablet TAKE 1 TABLET DAILY 90 tablet 1    TRULICITY 3 RN/2.0FW SOPN INJECT 3 MG INTO THE SKIN ONCE A WEEK 6 mL 1 atorvastatin (LIPITOR) 40 MG tablet TAKE 1 TABLET EVERY NIGHT 90 tablet 1    alendronate (FOSAMAX) 70 MG tablet TAKE 1 TABLET EVERY 7 DAYS 12 tablet 3    Cholecalciferol (VITAMIN D) 50 MCG (2000 UT) CAPS capsule Take by mouth Once week       brimonidine (ALPHAGAN) 0.2 % ophthalmic solution Apply to eye 2 times daily       latanoprost (XALATAN) 0.005 % ophthalmic solution 1 drop nightly       torsemide (DEMADEX) 20 MG tablet Take 20 mg by mouth daily       finasteride (PROSCAR) 5 MG tablet Take 1 tablet by mouth daily 30 tablet 1    cetirizine (ZYRTEC) 5 MG tablet Take 1 tablet by mouth daily 90 tablet 1    aspirin 81 MG EC tablet Take 81 mg by mouth daily       HYDROcodone-acetaminophen (NORCO)  MG per tablet Take 1 tablet by mouth 3 times daily as needed (Pain - back & hips). Pain specialist       ammonium lactate (AMLACTIN) 12 % cream Apply topically as needed       vitamin B-6 (PYRIDOXINE) 50 MG tablet Take 50 mg by mouth daily         This patient presents to the office today for a preoperative consultation at the request of surgeon, Dr. Clary Pinto MD, who plans on performing robotic bladder neck reconstruction on October 15 at [facility]. Conservative therapy:  failed .     Planned anesthesia: None   Known anesthesia problems: None   Bleeding risk: No recent or remote history of abnormal bleeding  Personal or FH of DVT/PE: No    Patient objection to receiving blood products: No    Patient Active Problem List   Diagnosis    Acquired spondylolisthesis    Acute idiopathic gout of right foot    Adjustment disorder with mixed disturbance of emotions and conduct    Anemia in chronic kidney disease    Aseptic necrosis of head and neck of femur    Chronic renal insufficiency, stage III (moderate) (HCC)    Degeneration of lumbar or lumbosacral intervertebral disc    Esophageal reflux    Depression    Hypersomnolence    Hyperlipidemia    History of colonic polyps    Family history of malignant neoplasm of gastrointestinal tract    Malignant neoplasm of prostate (HCC)    Impotence of organic origin    Morbid obesity ([address]    Opioid type dependence, continuous (HCC)    KAMRAN (obstructive sleep apnea)    Osteoarthritis of right knee    Osteopenia    ROB (renal osteodystrophy)    Spondylosis of lumbar region without myelopathy or radiculopathy    Type 2 diabetes mellitus with stage 3 chronic kidney disease, without long-term current use of insulin, unspecified whether stage 3a or 3b CKD ([address]    Acute kidney injury superimposed on CKD [facility])    Essential hypertension    Urinary retention       Past Medical History:   Diagnosis Date    Acute on chronic diastolic heart failure (HCC)     Acute respiratory failure with hypoxia ([address] 4/5/2021    Anesthesia     anxiety regarding ETT    Cancer ([address]     prostate    Hyperlipidemia     Hypertension     Obesity     KAMRAN treated with BiPAP     nasal    Osteoarthritis     Type 2 diabetes mellitus without complication ([address]     Type 2 diabetes mellitus without complication, without long-term current use of insulin ([address] 2/7/2018    Urinary incontinence, stress, male     Wears dentures      Past Surgical History:   Procedure Laterality Date    CT RETROPERITONEAL PERC DRAIN  12/23/2021    CT RETROPERITONEAL PERC DRAIN 12/23/2021 [facility] CT SCAN    CYSTOSCOPY N/A 6/21/2021    CYSTOSCOPY, TRANSURETHRAL INCISION OF BLADDER NECK CONTRACTURE WITH COLD KNIFE AND COMPLEX URETHRAL CATHETER PLACEMENT performed by Nely Alfaro MD at [facility] Left     THR    KNEE ARTHROSCOPY Right     SHOULDER SURGERY Bilateral     TOTAL HIP ARTHROPLASTY       Family History   Problem Relation Age of Onset    Hypertension Mother     Diabetes Mother     Hypertension Father      Social History     Socioeconomic History    Marital status: Single     Spouse name: Not on file    Number of children: 2    Years of education: Not on file    Highest education level: High school graduate   Occupational History    Occupation: Rerited from [employer]    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 He has 2 adult children. Social Determinants of Health     Financial Resource Strain: Medium Risk    Difficulty of Paying Living Expenses: Somewhat hard   Food Insecurity: No Food Insecurity    Worried About Running Out of Food in the Last Year: Never true    Ran Out of Food in the Last Year: Never true   Transportation Needs: Not on file   Physical Activity: Inactive    Days of Exercise per Week: 0 days    Minutes of Exercise per Session: 0 min   Stress: Not on file   Social Connections: Not on file   Intimate Partner Violence: Not on file   Housing Stability: Not on file       Review of Systems  A comprehensive review of systems was negative except for what was noted in the HPI. Physical Exam   Constitutional: He is oriented to person, place, and time. He appears well-developed and well-nourished. No distress. HENT:   Head: Normocephalic and atraumatic. Mouth/Throat: Uvula is midline, oropharynx is clear and moist and mucous membranes are normal.   Eyes: Conjunctivae and EOM are normal. Pupils are equal, round, and reactive to light. Neck: Trachea normal and normal range of motion. Neck supple. No JVD present. Carotid bruit is not present. No mass and no thyromegaly present. Cardiovascular: Normal rate, regular rhythm, normal heart sounds and intact distal pulses.   Exam reveals no gallop and no friction rub.  No murmur heard. Pulmonary/Chest: Effort normal and breath sounds normal. No respiratory distress. He has no wheezes. He has no rales. Abdominal: Soft. Normal aorta and bowel sounds are normal. He exhibits no distension and no mass. There is no hepatosplenomegaly. No tenderness. Musculoskeletal: He exhibits no edema and no tenderness. Neurological: He is alert and oriented to person, place, and time. He has normal strength. No cranial nerve deficit or sensory deficit. Coordination and gait normal.   Skin: Skin is warm and dry. No rash noted. No erythema. Psychiatric: He has a normal mood and affect. His behavior is normal.     EKG Interpretation:  normal sinus rhythm, unchanged from previous tracings. Lab Review not applicable        Assessment:       68 y.o. patient with planned surgery as above. Known risk factors for perioperative complications: Congestive heart failure, Diabetes mellitus, Hypertension, Renal dysfunction  Current medications which may produce withdrawal symptoms if withheld perioperatively: none      Plan:     1. Preoperative workup as follows: ECG  2. Change in medication regimen before surgery: None  3. Prophylaxis for cardiac events with perioperative beta-blockers: Not indicated  ACC/AHA indications for pre-operative beta-blocker use:    Vascular surgery with history of postitive stress test  Intermediate or high risk surgery with history of CAD   Intermediate or high risk surgery with multiple clinical predictors of CAD- 2 of the following: history of compensated or prior heart failure, history of cerebrovascular disease, DM, or renal insufficiency    Routine administration of higher-dose, long-acting metoprolol in beta-blocker-naïve patients on the day of surgery, and in the absence of dose titration is associated with an overall increase in mortality.   Beta-blockers should be started days to weeks prior to surgery and titrated to pulse < 70.  4. Deep vein thrombosis prophylaxis: regimen to be chosen by surgical team  5.  No contraindications to planned surgery 0 = swallows foods/liquids without difficulty

## 2022-11-13 NOTE — DISCHARGE NOTE PROVIDER - NSDCDCMDCOMP_GEN_ALL_CORE
Problem: Discharge Planning  Goal: Discharge to home or other facility with appropriate resources  11/13/2022 0351 by Ibis Briones RN  Outcome: Progressing  Flowsheets (Taken 11/13/2022 0351)  Discharge to home or other facility with appropriate resources:   Identify barriers to discharge with patient and caregiver   Arrange for needed discharge resources and transportation as appropriate   Identify discharge learning needs (meds, wound care, etc)   Arrange for interpreters to assist at discharge as needed   Refer to discharge planning if patient needs post-hospital services based on physician order or complex needs related to functional status, cognitive ability or social support system  11/12/2022 2001 by Rubia Byrne RN  Flowsheets  Taken 11/12/2022 2001 by Rubia Byrne RN  Discharge to home or other facility with appropriate resources:   Identify barriers to discharge with patient and caregiver   Arrange for needed discharge resources and transportation as appropriate   Identify discharge learning needs (meds, wound care, etc)  Taken 11/12/2022 1202 by Rubia Byren RN  Discharge to home or other facility with appropriate resources:   Identify barriers to discharge with patient and caregiver   Arrange for needed discharge resources and transportation as appropriate   Identify discharge learning needs (meds, wound care, etc)  Taken 11/12/2022 0800 by Timur Bourgeois RN  Discharge to home or other facility with appropriate resources:   Identify barriers to discharge with patient and caregiver   Arrange for needed discharge resources and transportation as appropriate   Identify discharge learning needs (meds, wound care, etc)   Arrange for interpreters to assist at discharge as needed   Refer to discharge planning if patient needs post-hospital services based on physician order or complex needs related to functional status, cognitive ability or social support system Problem: Pain  Goal: Verbalizes/displays adequate comfort level or baseline comfort level  11/13/2022 0351 by Joshua Terrazas RN  Outcome: Progressing  Flowsheets (Taken 11/13/2022 0351)  Verbalizes/displays adequate comfort level or baseline comfort level:   Encourage patient to monitor pain and request assistance   Assess pain using appropriate pain scale   Administer analgesics based on type and severity of pain and evaluate response   Implement non-pharmacological measures as appropriate and evaluate response   Consider cultural and social influences on pain and pain management   Notify Licensed Independent Practitioner if interventions unsuccessful or patient reports new pain  11/12/2022 2001 by Sherren Combs, RN  Flowsheets (Taken 11/12/2022 2001)  Verbalizes/displays adequate comfort level or baseline comfort level:   Assess pain using appropriate pain scale   Encourage patient to monitor pain and request assistance   Administer analgesics based on type and severity of pain and evaluate response   Implement non-pharmacological measures as appropriate and evaluate response   Consider cultural and social influences on pain and pain management   Notify Licensed Independent Practitioner if interventions unsuccessful or patient reports new pain     Problem: Chronic Conditions and Co-morbidities  Goal: Patient's chronic conditions and co-morbidity symptoms are monitored and maintained or improved  11/13/2022 0351 by Joshua Terrazas RN  Outcome: Progressing  Flowsheets (Taken 11/13/2022 0351)  Care Plan - Patient's Chronic Conditions and Co-Morbidity Symptoms are Monitored and Maintained or Improved:   Monitor and assess patient's chronic conditions and comorbid symptoms for stability, deterioration, or improvement   Collaborate with multidisciplinary team to address chronic and comorbid conditions and prevent exacerbation or deterioration   Update acute care plan with appropriate goals if chronic or comorbid symptoms are exacerbated and prevent overall improvement and discharge  11/12/2022 2001 by Stephany Soto RN  Flowsheets  Taken 11/12/2022 2001 by Stephany Soto RN  Care Plan - Patient's Chronic Conditions and Co-Morbidity Symptoms are Monitored and Maintained or Improved:   Monitor and assess patient's chronic conditions and comorbid symptoms for stability, deterioration, or improvement   Collaborate with multidisciplinary team to address chronic and comorbid conditions and prevent exacerbation or deterioration   Update acute care plan with appropriate goals if chronic or comorbid symptoms are exacerbated and prevent overall improvement and discharge  Taken 11/12/2022 1202 by Reg Hardnig 34 - Patient's Chronic Conditions and Co-Morbidity Symptoms are Monitored and Maintained or Improved:   Monitor and assess patient's chronic conditions and comorbid symptoms for stability, deterioration, or improvement   Collaborate with multidisciplinary team to address chronic and comorbid conditions and prevent exacerbation or deterioration   Update acute care plan with appropriate goals if chronic or comorbid symptoms are exacerbated and prevent overall improvement and discharge  Taken 11/12/2022 0800 by Reg Thompson 34 - Patient's Chronic Conditions and Co-Morbidity Symptoms are Monitored and Maintained or Improved:   Monitor and assess patient's chronic conditions and comorbid symptoms for stability, deterioration, or improvement   Collaborate with multidisciplinary team to address chronic and comorbid conditions and prevent exacerbation or deterioration   Update acute care plan with appropriate goals if chronic or comorbid symptoms are exacerbated and prevent overall improvement and discharge     Problem: ABCDS Injury Assessment  Goal: Absence of physical injury  11/13/2022 0351 by Sarah Barker RN  Outcome: Progressing  Flowsheets (Taken 11/13/2022 0332)  Absence of Physical Injury: Implement safety measures based on patient assessment  11/12/2022 2001 by Tracy Benton RN  Flowsheets (Taken 11/12/2022 2001)  Absence of Physical Injury: Implement safety measures based on patient assessment     Problem: Safety - Adult  Goal: Free from fall injury  11/13/2022 0351 by Libertad Whitaker RN  Outcome: Progressing  Flowsheets (Taken 11/13/2022 0351)  Free From Fall Injury: Instruct family/caregiver on patient safety  11/12/2022 2001 by [address] [place] [address] (Taken 11/12/2022 2001)  Free From Fall Injury:   Based on caregiver fall risk screen, instruct family/caregiver to ask for assistance with transferring infant if caregiver noted to have fall risk factors   Instruct family/caregiver on patient safety     Problem: Cardiovascular - Adult  Goal: Maintains optimal cardiac output and hemodynamic stability  11/13/2022 0351 by Libertad Whitaker RN  Outcome: Progressing  11/12/2022 2001 by Tracy Benton RN  Flowsheets  Taken 11/12/2022 2001  Maintains optimal cardiac output and hemodynamic stability:   Monitor blood pressure and heart rate   Monitor urine output and notify Licensed Independent Practitioner for values outside of normal range   Assess for signs of decreased cardiac output  Taken 11/12/2022 1202  Maintains optimal cardiac output and hemodynamic stability:   Monitor blood pressure and heart rate   Monitor urine output and notify Licensed Independent Practitioner for values outside of normal range   Assess for signs of decreased cardiac output  Goal: Absence of cardiac dysrhythmias or at baseline  11/13/2022 0351 by Libertad Whitaker RN  Outcome: Progressing  Flowsheets (Taken 11/13/2022 0351)  Absence of cardiac dysrhythmias or at baseline:   Monitor cardiac rate and rhythm   Assess for signs of decreased cardiac output   Administer antiarrhythmia medication and electrolyte replacement as ordered  11/12/2022 2001 by Albert Zacarias Frank Huynh RN  Flowsheets  Taken 11/12/2022 2001  Absence of cardiac dysrhythmias or at baseline:   Monitor cardiac rate and rhythm   Assess for signs of decreased cardiac output  Taken 11/12/2022 1202  Absence of cardiac dysrhythmias or at baseline:   Monitor cardiac rate and rhythm   Assess for signs of decreased cardiac output   Administer antiarrhythmia medication and electrolyte replacement as ordered     Problem: Respiratory - Adult  Goal: Achieves optimal ventilation and oxygenation  11/13/2022 0351 by Angela Dalal RN  Outcome: Progressing  Flowsheets (Taken 11/13/2022 0351)  Achieves optimal ventilation and oxygenation:   Assess for changes in respiratory status   Assess for changes in mentation and behavior   Assess the need for suctioning and aspirate as needed   Position to facilitate oxygenation and minimize respiratory effort   Oxygen supplementation based on oxygen saturation or arterial blood gases   Encourage broncho-pulmonary hygiene including cough, deep breathe, incentive spirometry   Assess and instruct to report shortness of breath or any respiratory difficulty   Respiratory therapy support as indicated  11/12/2022 2001 by Kelsi Peres RN  Flowsheets  Taken 11/12/2022 2001  Achieves optimal ventilation and oxygenation:   Assess for changes in respiratory status   Assess for changes in mentation and behavior   Position to facilitate oxygenation and minimize respiratory effort   Assess and instruct to report shortness of breath or any respiratory difficulty   Respiratory therapy support as indicated  Taken 11/12/2022 1202  Achieves optimal ventilation and oxygenation:   Assess for changes in respiratory status   Assess for changes in mentation and behavior   Position to facilitate oxygenation and minimize respiratory effort   Oxygen supplementation based on oxygen saturation or arterial blood gases   Assess the need for suctioning and aspirate as needed     Problem: Metabolic/Fluid and Electrolytes - Adult  Goal: Electrolytes maintained within normal limits  11/13/2022 0351 by Araceli Wick RN  Outcome: Progressing  11/12/2022 2001 by Hughes Galeazzi, RN  Flowsheets  Taken 11/12/2022 2001 by Hughes Galeazzi, RN  Electrolytes maintained within normal limits:   Monitor labs and assess patient for signs and symptoms of electrolyte imbalances   Administer electrolyte replacement as ordered   Instruct patient on fluid and nutrition restrictions as appropriate  Taken 11/12/2022 1202 by Hughes Galeazzi, RN  Electrolytes maintained within normal limits:   Monitor labs and assess patient for signs and symptoms of electrolyte imbalances   Administer electrolyte replacement as ordered   Instruct patient on fluid and nutrition restrictions as appropriate  Taken 11/12/2022 0800 by Mario Daniel RN  Electrolytes maintained within normal limits:   Monitor labs and assess patient for signs and symptoms of electrolyte imbalances   Administer electrolyte replacement as ordered   Monitor response to electrolyte replacements, including repeat lab results as appropriate   Fluid restriction as ordered   Instruct patient on fluid and nutrition restrictions as appropriate  Goal: Hemodynamic stability and optimal renal function maintained  11/13/2022 0351 by Araceli Wick RN  Outcome: Progressing  Flowsheets (Taken 11/13/2022 0351)  Hemodynamic stability and optimal renal function maintained:   Monitor labs and assess for signs and symptoms of volume excess or deficit   Monitor intake, output and patient weight   Monitor urine specific gravity, serum osmolarity and serum sodium as indicated or ordered   Encourage oral intake as appropriate   Instruct patient on fluid and nutrition restrictions as appropriate   Monitor response to interventions for patient's volume status, including labs, urine output, blood pressure (other measures as available)  11/12/2022 2001 by Alison Adrian Sahra Ortiz RN  Flowsheets  Taken 11/12/2022 2001 by Beck Fernandez RN  Hemodynamic stability and optimal renal function maintained:   Monitor labs and assess for signs and symptoms of volume excess or deficit   Monitor response to interventions for patient's volume status, including labs, urine output, blood pressure (other measures as available)   Encourage oral intake as appropriate   Instruct patient on fluid and nutrition restrictions as appropriate  Taken 11/12/2022 1202 by Beck Fernandez RN  Hemodynamic stability and optimal renal function maintained:   Monitor labs and assess for signs and symptoms of volume excess or deficit   Monitor intake, output and patient weight  Taken 11/12/2022 0800 by Anette Merrill RN  Hemodynamic stability and optimal renal function maintained:   Monitor labs and assess for signs and symptoms of volume excess or deficit   Monitor intake, output and patient weight   Monitor urine specific gravity, serum osmolarity and serum sodium as indicated or ordered   Monitor response to interventions for patient's volume status, including labs, urine output, blood pressure (other measures as available)   Encourage oral intake as appropriate   Instruct patient on fluid and nutrition restrictions as appropriate This document is complete and the patient is ready for discharge.

## 2023-03-12 NOTE — PRE-ANESTHESIA EVALUATION ADULT - HEIGHT IN CM
Please call the office when you leave to schedule an appointment for 2 weeks  Activity:    May lift 10 lb as many times as desired the 1st week,       20 lb in 2 weeks,       30 lb in 3 weeks  Walking is encouraged  Normal daily activities including climbing steps are okay  Do not engage in strenuous activity ( sit-ups or crutches) or contact sports for 4-6 weeks post-operatively    Return to Work:   Okay to return to work when you feel well if you desire  Diet:   You may return to your normal healthy diet  Wound Care: Your wound is closed with dissolvable stitches and glue  It is okay to shower  Wash incision gently with soap and water and pat dry  Do not soak incisions in bath water or swim for two weeks  Do not apply any creams or ointments  Pain Medication:   Please take as directed if needed  May use Advil or Motrin in addition  Recall, the pain medicine and anesthesia is associated with constipation  No driving while taking narcotic pain medications  Other: It is normal to developed a “healing ridge” / firm incision after surgery  This is your body making scar tissue  It is a good sign  Constipation is very common after general anesthesia  Please use milk of magnesia as needed in order to help prevent constipation  It is normal to get bruising after surgery  If you have questions after discharge please call the office      If you have increased pain, fever >101 5, increased drainage, redness or a bad smell at your surgery site, please call us immediately or come directly to the Emergency Room
[phone]

## 2023-05-10 NOTE — PRE-OP CHECKLIST - RESPIRATORY RATE (BREATHS/MIN)
Spoke with patient and significant other    Had rough couple days with confusion, somewhat restless/agitated at night.  Hwoever was only taking lactulose once a day.  Now taking twice a day, improved.  Reviewed titration of lactulose with BMs and mental status.  Encouraged them to track how much they are taking, how many BMs they are having, etc to help titrate to symptoms.      Denies fever or flu like sx.  Discussed what sx should prompt trip to ER.      Patient will have follow up in en with Dr Bain to determine next steps, determine if management with TIPS sufficient vs proceed towards transplant.  MELD remains low.    
18

## 2024-08-16 NOTE — ED ADULT NURSE NOTE - ISOLATION INDICATION AIRBORNE CONTACT
Problem: Discharge Planning  Goal: Discharge to home or other facility with appropriate resources  8/16/2024 1107 by Leslie Payne RN  Outcome: Progressing  8/15/2024 2243 by Collin Oliveros RN  Outcome: Progressing     Problem: Pain  Goal: Verbalizes/displays adequate comfort level or baseline comfort level  8/16/2024 1107 by Leslie Payne RN  Outcome: Progressing  8/15/2024 2243 by Collin Oliveros RN  Outcome: Progressing     Problem: Skin/Tissue Integrity  Goal: Absence of new skin breakdown  Description: 1.  Monitor for areas of redness and/or skin breakdown  2.  Assess vascular access sites hourly  3.  Every 4-6 hours minimum:  Change oxygen saturation probe site  4.  Every 4-6 hours:  If on nasal continuous positive airway pressure, respiratory therapy assess nares and determine need for appliance change or resting period.  8/16/2024 1107 by Leslie Payne RN  Outcome: Progressing  8/15/2024 2243 by Collin Oliveros RN  Outcome: Progressing     Problem: ABCDS Injury Assessment  Goal: Absence of physical injury  8/16/2024 1107 by Leslie Payne RN  Outcome: Progressing  8/15/2024 2243 by Collin Oliveros RN  Outcome: Progressing     Problem: Safety - Adult  Goal: Free from fall injury  8/16/2024 1107 by Leslie Payne RN  Outcome: Progressing  8/15/2024 2243 by Collin Oliveros RN  Outcome: Progressing      Other Specify

## 2025-01-30 NOTE — PATIENT PROFILE ADULT - FALL HARM RISK CONCLUSION
LDL goal less than 100 (ideally below 70) ASCVD risk 8.2%  Discussions previously had regarding initiation of statin therapy and patient elected to try lifestyle modifications  He is due for repeat lipid profile at which point we can reevaluate therapy options    Lab Results   Component Value Date    HGBA1C 6.8 (A) 01/30/2025             Fall with Harm Risk

## 2025-06-20 NOTE — PHYSICAL THERAPY INITIAL EVALUATION ADULT - BALANCE DISTURBANCE, IDENTIFIED IMPAIRMENT CONTRIBUTE, REHAB EVAL
Attended Phase II Cardiac Rehab. No medication or health history changes reported. See [facility] for details.  
decreased strength